# Patient Record
Sex: MALE | Race: WHITE | NOT HISPANIC OR LATINO | Employment: FULL TIME | URBAN - METROPOLITAN AREA
[De-identification: names, ages, dates, MRNs, and addresses within clinical notes are randomized per-mention and may not be internally consistent; named-entity substitution may affect disease eponyms.]

---

## 2017-06-28 ENCOUNTER — ALLSCRIPTS OFFICE VISIT (OUTPATIENT)
Dept: OTHER | Facility: OTHER | Age: 62
End: 2017-06-28

## 2017-06-28 ENCOUNTER — GENERIC CONVERSION - ENCOUNTER (OUTPATIENT)
Dept: OTHER | Facility: OTHER | Age: 62
End: 2017-06-28

## 2017-06-28 DIAGNOSIS — I10 ESSENTIAL (PRIMARY) HYPERTENSION: ICD-10-CM

## 2017-06-29 LAB
A/G RATIO (HISTORICAL): 1.7 (ref 1.2–2.2)
ALBUMIN SERPL BCP-MCNC: 4.7 G/DL (ref 3.6–4.8)
ALP SERPL-CCNC: 60 IU/L (ref 39–117)
ALT SERPL W P-5'-P-CCNC: 31 IU/L (ref 0–44)
AST SERPL W P-5'-P-CCNC: 29 IU/L (ref 0–40)
BILIRUB SERPL-MCNC: 0.4 MG/DL (ref 0–1.2)
BUN SERPL-MCNC: 10 MG/DL (ref 8–27)
BUN/CREA RATIO (HISTORICAL): 13 (ref 10–24)
CALCIUM SERPL-MCNC: 9.3 MG/DL (ref 8.6–10.2)
CHLORIDE SERPL-SCNC: 102 MMOL/L (ref 96–106)
CHOLEST SERPL-MCNC: 175 MG/DL (ref 100–199)
CHOLEST/HDLC SERPL: 4 RATIO UNITS (ref 0–5)
CO2 SERPL-SCNC: 20 MMOL/L (ref 18–29)
CREAT SERPL-MCNC: 0.76 MG/DL (ref 0.76–1.27)
DEPRECATED RDW RBC AUTO: 14.4 % (ref 12.3–15.4)
EGFR AFRICAN AMERICAN (HISTORICAL): 112 ML/MIN/1.73
EGFR-AMERICAN CALC (HISTORICAL): 97 ML/MIN/1.73
GLUCOSE SERPL-MCNC: 99 MG/DL (ref 65–99)
HCT VFR BLD AUTO: 44.7 % (ref 37.5–51)
HDLC SERPL-MCNC: 44 MG/DL
HEPATITIS C ANTIBODY (HISTORICAL): <0.1 S/CO RATIO (ref 0–0.9)
HGB BLD-MCNC: 14.5 G/DL (ref 12.6–17.7)
LDLC SERPL CALC-MCNC: 99 MG/DL (ref 0–99)
MCH RBC QN AUTO: 29.8 PG (ref 26.6–33)
MCHC RBC AUTO-ENTMCNC: 32.4 G/DL (ref 31.5–35.7)
MCV RBC AUTO: 92 FL (ref 79–97)
PLATELET # BLD AUTO: 354 X10E3/UL (ref 150–379)
POTASSIUM SERPL-SCNC: 5 MMOL/L (ref 3.5–5.2)
RBC (HISTORICAL): 4.86 X10E6/UL (ref 4.14–5.8)
SODIUM SERPL-SCNC: 142 MMOL/L (ref 134–144)
TOT. GLOBULIN, SERUM (HISTORICAL): 2.8 G/DL (ref 1.5–4.5)
TOTAL PROTEIN (HISTORICAL): 7.5 G/DL (ref 6–8.5)
TRIGL SERPL-MCNC: 160 MG/DL (ref 0–149)
TSH SERPL DL<=0.05 MIU/L-ACNC: 2.25 UIU/ML (ref 0.45–4.5)
URIC ACID (HISTORICAL): 10.9 MG/DL (ref 3.7–8.6)
VLDLC SERPL CALC-MCNC: 32 MG/DL (ref 5–40)
WBC # BLD AUTO: 6.8 X10E3/UL (ref 3.4–10.8)

## 2017-06-30 ENCOUNTER — GENERIC CONVERSION - ENCOUNTER (OUTPATIENT)
Dept: OTHER | Facility: OTHER | Age: 62
End: 2017-06-30

## 2017-06-30 LAB — PROSTATE SPECIFIC ANTIGEN (HISTORICAL): 1 NG/ML (ref 0–4)

## 2017-11-13 ENCOUNTER — ALLSCRIPTS OFFICE VISIT (OUTPATIENT)
Dept: OTHER | Facility: OTHER | Age: 62
End: 2017-11-13

## 2017-11-14 ENCOUNTER — GENERIC CONVERSION - ENCOUNTER (OUTPATIENT)
Dept: OTHER | Facility: OTHER | Age: 62
End: 2017-11-14

## 2017-11-14 LAB — URIC ACID (HISTORICAL): 4.3 MG/DL (ref 3.7–8.6)

## 2017-11-14 NOTE — PROGRESS NOTES
Assessment    1  Gout (274 9) (M10 9)   2  Benign hypertension (401 1) (I10)   3  GERD without esophagitis (530 81) (K21 9)    Plan  Benign hypertension    · Valsartan-Hydrochlorothiazide 160-25 MG Oral Tablet; TAKE 1 TABLET DAILY  GERD without esophagitis    · Omeprazole 40 MG Oral Capsule Delayed Release; TAKE 1 CAPSULE DailyBefore Meals  Gout    · Allopurinol 300 MG Oral Tablet; TAKE 2 TABLETS DAILY   · (1) URIC ACID; Status:Active; Requested for:13Nov2017;   1   Gout-uncontrolled on allopurinol, he has had 3 flares since starting it  We will recheck the uric acid today and I am increasing the allopurinol to 600 milligrams daily  2   Hypertension-uncontrolled on valsartan  I am changing the medication to valsartan with hydrochlorothiazide  This should bring the pressure is down to where they need to be but he will continue to keep track of them on an occasional basis and let me know if they are still high  3   GERD on the indomethacin-I am going to give him omeprazole to take while he is on anti-inflammatories as I think this will help him tolerate the medications better  He does not need to take it when he is not on the anti-inflammatories  I also advised that if the indomethacin is not helping the gout after another few days he can certainly call and I can prescribe prednisone  Chief Complaint  Pt here for f/u on gout and bp check  He was taking allopurinol and since has had 3 episodes of gout some worse some not too bad  He seems to remember being on it in the past and having the same response has noticed his BP has been up sometimes  He talked about starting HCTZ        History of Present Illness  HPI: Patient is here today to follow-up on his hypertension and gout  he was last here he has been on the allopurinol - uric acid was 10 9 and I started 300mg dailyhad 2 minor episodes of gout since then and most recently one more severe episode in the right foot/ankle as he has had beforewent [place] because it got really bad - he got indomethacinpretty hard on his stomachit is helping the goutthe past few years he really has avoided triggers, ken seafood/shellfishavoids anything high in purineslittle beefevery on his father side of the family and most of his siblings, nieces and nephews all have gout  far as the BP, he feels it is still a little high every now and thenis about where it is todaychest pain or shortness of breathNo headaches      Active Problems  1  Arthritis (716 90) (M19 90)   2  Benign hypertension (401 1) (I10)   3  Gout (274 9) (M10 9)   4  Hyperlipidemia (272 4) (E78 5)   5  Need for hepatitis C screening test (V73 89) (Z11 59)   6  Screening PSA (prostate specific antigen) (V76 44) (Z12 5)    Past Medical History  1  History of irregular heartbeat (V12 59) (Z86 79)   2  Personal history of kidney stones (V13 01) (U59 111)  Active Problems And Past Medical History Reviewed: The active problems and past medical history were reviewed and updated today  Family History  Mother    1  Family history of arthritis (V17 7) (Z82 61)   2  Family history of cardiac disorder (V17 49) (Z82 49)   3  Family history of hypertension (V17 49) (Z82 49)  Father    4  Family history of gout (V18 19) (Z82 69)   5  Family history of malignant neoplasm (V16 9) (Z80 9)  Sibling    10  Family history of gout (V18 19) (Z82 69)  Multiple Family Members    7  Family history of gout (V18 19) (Z82 69)  Paternal Relatives    8  Family history of gout (V18 19) (Z82 69)  Family History Reviewed: The family history was reviewed and updated today  Social History   · Currently works full-time (V49 89) (Z78 9)   · Former smoker (X12 95) (P37 750)   ·    · No illicit drug use   · Non smoker (V49 89) (Z78 9)   · Occasional alcohol use  The social history was reviewed and updated today  Surgical History    1  History of Hernia Repair  Surgical History Reviewed:    The surgical history was reviewed and updated today  Current Meds   1  Allopurinol 300 MG Oral Tablet; TAKE 1 TABLET DAILY; Therapy: [id number] to (Evaluate:46Rfs1503)  Requested for: 30Jun2017; Last Rx:30Jun2017; Status: ACTIVE - Transmit to Pharmacy - Awaiting Verification Ordered   2  Pravastatin Sodium 20 MG Oral Tablet; TAKE 1 TABLET DAILY AS DIRECTED; Therapy: 44JPG3218 to (Evaluate:56Xpb6433) Recorded   3  Valsartan 160 MG Oral Tablet; TAKE 1 TABLET DAILY; Therapy: 29PBS4985 to (Bertis Ore)  Requested for: 41PJI3591; Last Rx:85Vqa9559 Ordered    The medication list was reviewed and updated today  Allergies  1  No Known Drug Allergies  2  Shellfish    Vitals   Recorded: 97KDD2252 04:09PM   Temperature 98 4 F   Heart Rate 78   Systolic 797   Diastolic 82   Height 5 ft 6 in   Weight 183 lb    BMI Calculated 29 54   BSA Calculated 1 93   O2 Saturation 98       Physical Exam   Constitutional  General appearance: No acute distress, well appearing and well nourished  Eyes  Conjunctiva and lids: No swelling, erythema, or discharge  Ears, Nose, Mouth, and Throat  External inspection of ears and nose: Normal    Pulmonary  Respiratory effort: No increased work of breathing or signs of respiratory distress  Musculoskeletal  Gait and station: Abnormal   Gait evaluation demonstrated limping on the right  Inspection/palpation of joints, bones, and muscles: Abnormal   Appearance - right ankle swelling  Skin  Skin and subcutaneous tissue: Normal without rashes or lesions     Psychiatric  Orientation to person, place and time: Normal    Mood and affect: Normal          Signatures   Electronically signed by : MADELYN Voss ; Nov 13 2017  4:42PM EST                       (Author)

## 2017-11-16 ENCOUNTER — APPOINTMENT (OUTPATIENT)
Dept: RADIOLOGY | Facility: CLINIC | Age: 62
End: 2017-11-16
Payer: COMMERCIAL

## 2017-11-16 DIAGNOSIS — M79.671 RIGHT FOOT PAIN: ICD-10-CM

## 2017-11-16 PROCEDURE — 73630 X-RAY EXAM OF FOOT: CPT

## 2017-11-22 ENCOUNTER — GENERIC CONVERSION - ENCOUNTER (OUTPATIENT)
Dept: OTHER | Facility: OTHER | Age: 62
End: 2017-11-22

## 2018-01-09 NOTE — RESULT NOTES
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

## 2018-01-13 NOTE — RESULT NOTES
Discussion/Summary   Please let the patient know his uric acid was 4 3-I think that we should NOT increase the dose of allopurinol as we had discussed at his visit due to this  I wonder if his symptoms are due to something other than gout given the uric acid level is so low  For now I would recommend he continue to take 300 mg of allopurinol once a day and please give him the information for Dr Shonna Simpson office, podiatry, to see if maybe there is something else going on that is not gout related       Verified Results  (1) URIC ACID [id number] 12:00AM Ilsa Salts     Test Name Result Flag Reference   Uric Acid, Serum 4 3 mg/dL  3 7-8 6   Therapeutic target for gout patients: <6 0

## 2018-01-14 VITALS
WEIGHT: 183 LBS | OXYGEN SATURATION: 98 % | TEMPERATURE: 98.4 F | DIASTOLIC BLOOD PRESSURE: 82 MMHG | BODY MASS INDEX: 29.41 KG/M2 | HEART RATE: 78 BPM | SYSTOLIC BLOOD PRESSURE: 140 MMHG | HEIGHT: 66 IN

## 2018-01-14 VITALS
HEIGHT: 66 IN | DIASTOLIC BLOOD PRESSURE: 90 MMHG | BODY MASS INDEX: 29.05 KG/M2 | HEART RATE: 77 BPM | TEMPERATURE: 98.8 F | SYSTOLIC BLOOD PRESSURE: 170 MMHG | WEIGHT: 180.75 LBS

## 2018-01-15 NOTE — RESULT NOTES
Discussion/Summary   Please let the pt know that all of his labs are normal EXCEPT for the uric acid  The uric acid should be less than 8 and it is 10 9  He has known gout and we discussed this at his visit  I would strongly recommend he take a medication called allopurinol which will lower the uric acid  As we discussed at his visit a build up of uric acid will cause chronic damage to his joints so it is very important to keep it low  This may be something he needs to take for the rest of his life to prevent chronic damage to his joints  If he has further questions please let me know  If he is agreeable I can send this in for him  Thx!      Verified Results  (1) URIC ACID 28Jun2017 12:00AM Maribell Riggs     Test Name Result Flag Reference   Uric Acid, Serum 10 9 mg/dL H 3 7-8 6   Therapeutic target for gout patients: <6 0

## 2018-04-10 ENCOUNTER — OFFICE VISIT (OUTPATIENT)
Dept: FAMILY MEDICINE CLINIC | Facility: CLINIC | Age: 63
End: 2018-04-10
Payer: COMMERCIAL

## 2018-04-10 VITALS
OXYGEN SATURATION: 98 % | WEIGHT: 182.5 LBS | DIASTOLIC BLOOD PRESSURE: 78 MMHG | SYSTOLIC BLOOD PRESSURE: 142 MMHG | HEART RATE: 67 BPM | HEIGHT: 66 IN | BODY MASS INDEX: 29.33 KG/M2 | TEMPERATURE: 98.7 F

## 2018-04-10 DIAGNOSIS — K40.90 RIGHT GROIN HERNIA: Primary | ICD-10-CM

## 2018-04-10 PROBLEM — M10.9 GOUT: Status: ACTIVE | Noted: 2017-06-28

## 2018-04-10 PROBLEM — I10 BENIGN HYPERTENSION: Status: ACTIVE | Noted: 2017-06-28

## 2018-04-10 PROBLEM — M19.90 ARTHRITIS: Status: ACTIVE | Noted: 2017-06-28

## 2018-04-10 PROBLEM — K21.9 GERD WITHOUT ESOPHAGITIS: Status: ACTIVE | Noted: 2017-11-13

## 2018-04-10 PROBLEM — E78.5 HYPERLIPIDEMIA: Status: ACTIVE | Noted: 2017-06-28

## 2018-04-10 PROCEDURE — 99213 OFFICE O/P EST LOW 20 MIN: CPT | Performed by: FAMILY MEDICINE

## 2018-04-10 RX ORDER — ALLOPURINOL 300 MG/1
2 TABLET ORAL DAILY
COMMUNITY
Start: 2017-06-30 | End: 2018-12-19 | Stop reason: SDUPTHER

## 2018-04-10 RX ORDER — PRAVASTATIN SODIUM 20 MG
1 TABLET ORAL DAILY
COMMUNITY
Start: 2017-06-28 | End: 2019-05-06 | Stop reason: ALTCHOICE

## 2018-04-10 RX ORDER — OMEPRAZOLE 40 MG/1
CAPSULE, DELAYED RELEASE ORAL DAILY
COMMUNITY
Start: 2017-11-13 | End: 2019-05-06 | Stop reason: ALTCHOICE

## 2018-04-10 RX ORDER — VALSARTAN 160 MG/1
1 TABLET ORAL DAILY
COMMUNITY
Start: 2017-06-28 | End: 2018-08-16

## 2018-04-10 RX ORDER — VALSARTAN AND HYDROCHLOROTHIAZIDE 160; 25 MG/1; MG/1
1 TABLET ORAL DAILY
COMMUNITY
Start: 2017-11-13 | End: 2018-08-16

## 2018-04-10 NOTE — PROGRESS NOTES
Assessment/Plan:      Diagnoses and all orders for this visit:    Right groin hernia  -     Ambulatory referral to General Surgery; Future    I am going to refer the patient to General surgery for further evaluation  Given the fact that this is in the same spot as his old hernia he probably does have a mild hernia there that ice simply cannot feel  Subjective:     Patient ID: Tana Munoz is a 57 y o  male  The pt is here with pain in the right side of the groin for about 3 months   It is in the same exact area where he had a hernia repair when he was 1 y/o  He does notice it with coughing  Notices when twisting his body as opposed to lifting  Does not bother him if he is sitting still  Does not wake him from sleep  It is relatively mild right now  He does not want to get any worse which is why he decided to come in        Review of Systems      The following portions of the patient's history were reviewed and updated as appropriate: allergies, current medications, past family history, past medical history, past social history, past surgical history and problem list     Objective:  Vitals:    04/10/18 0851   BP: 142/78   Pulse: 67   Temp: 98 7 °F (37 1 °C)   SpO2: 98%   Weight: 82 8 kg (182 lb 8 oz)   Height: 5' 6" (1 676 m)      Physical Exam   Constitutional: He appears well-developed and well-nourished  Abdominal: Soft  There is no tenderness  Mild ttp in the right groin over the old hernia scar, no overt hernia appreciated   Skin: Skin is warm and dry

## 2018-04-19 ENCOUNTER — OFFICE VISIT (OUTPATIENT)
Dept: SURGERY | Facility: HOSPITAL | Age: 63
End: 2018-04-19
Attending: FAMILY MEDICINE
Payer: COMMERCIAL

## 2018-04-19 VITALS
RESPIRATION RATE: 16 BRPM | DIASTOLIC BLOOD PRESSURE: 74 MMHG | HEIGHT: 66 IN | WEIGHT: 182.8 LBS | TEMPERATURE: 99.1 F | BODY MASS INDEX: 29.38 KG/M2 | HEART RATE: 76 BPM | SYSTOLIC BLOOD PRESSURE: 130 MMHG

## 2018-04-19 DIAGNOSIS — K40.91 RECURRENT RIGHT INGUINAL HERNIA: ICD-10-CM

## 2018-04-19 DIAGNOSIS — K40.90 LEFT INGUINAL HERNIA: ICD-10-CM

## 2018-04-19 DIAGNOSIS — D18.01 HEMANGIOMA OF SKIN: ICD-10-CM

## 2018-04-19 DIAGNOSIS — K42.9 UMBILICAL HERNIA WITHOUT OBSTRUCTION OR GANGRENE: ICD-10-CM

## 2018-04-19 DIAGNOSIS — Z12.11 ENCOUNTER FOR SCREENING COLONOSCOPY: ICD-10-CM

## 2018-04-19 DIAGNOSIS — D22.9 MULTIPLE PIGMENTED NEVI: ICD-10-CM

## 2018-04-19 DIAGNOSIS — K40.90 RIGHT GROIN HERNIA: ICD-10-CM

## 2018-04-19 DIAGNOSIS — Z12.11 SPECIAL SCREENING FOR MALIGNANT NEOPLASMS, COLON: Primary | ICD-10-CM

## 2018-04-19 DIAGNOSIS — L91.8 SKIN TAGS, MULTIPLE ACQUIRED: ICD-10-CM

## 2018-04-19 PROBLEM — K40.20 NON-RECURRENT BILATERAL INGUINAL HERNIA WITHOUT OBSTRUCTION OR GANGRENE: Status: ACTIVE | Noted: 2018-04-19

## 2018-04-19 PROCEDURE — 99204 OFFICE O/P NEW MOD 45 MIN: CPT | Performed by: SURGERY

## 2018-04-19 RX ORDER — SODIUM CHLORIDE 9 MG/ML
75 INJECTION, SOLUTION INTRAVENOUS CONTINUOUS
Status: CANCELLED | OUTPATIENT
Start: 2018-05-08

## 2018-04-19 RX ORDER — SODIUM CHLORIDE 9 MG/ML
125 INJECTION, SOLUTION INTRAVENOUS CONTINUOUS
Status: CANCELLED | OUTPATIENT
Start: 2018-05-08

## 2018-04-19 NOTE — PROGRESS NOTES
Assessment/Plan: Kaushik Briseno is a 57year old male whp presents today, per referral by Dr Azeem Rivera, for consultation regarding a right inguinal hernia  Physical exam revealed recurrent right inguinal hernia, reducible left inguinal hernia, and umbilical hernia  Discussed risks, benefits, and alternatives to laparoscopic bilateral inguinal hernia repair and umbilical hernia repair  Explained the surgery, as well as pre- and post-operative protocol and restrictions  No heavy lifting greater than 15 pounds and no strenuous activity for the first two weeks  No heavy lifting greater than 25 pounds for weeks 3-4, light cardiovascular activity permissible  He will schedule surgery for his earliest convenience  He knows to call if any questions or concerns arise  Colonoscopy- Encouraged him to have a screening colonoscopy  Discussed risks and benefits  He will schedule for the same week as surgery  Skin- Encouraged him to have his multiple pigmented nevi and skin tags of neck and multiple pigmented nevi and scattered hemangiomas of back monitored by PCP or dermatologist     No problem-specific Assessment & Plan notes found for this encounter  Diagnoses and all orders for this visit:    Right groin hernia  -     Ambulatory referral to General Surgery          Subjective:      Patient ID: Kaushik Briseno is a 57 y o  male  Kaushik Briseno is a 57year old male whp presents today, per referral by Dr Azeem Rivera, for consultation regarding a right inguinal hernia  This would be a recurrent right inguinal hernia as he had a right inguinal hernia open repair when he was a child  He has had pain for 4 months    Colonoscopy- He has never had a colonoscopy            The following portions of the patient's history were reviewed and updated as appropriate: allergies, current medications, past family history, past medical history, past social history, past surgical history and problem list     Review of Systems Constitutional: Negative  HENT: Negative  Eyes: Negative  Respiratory: Negative  Cardiovascular: Negative  Gastrointestinal: Positive for abdominal pain  Endocrine: Negative  Genitourinary: Negative  Musculoskeletal: Negative  Skin: Negative  Allergic/Immunologic: Negative  Neurological: Negative  Hematological: Negative  Psychiatric/Behavioral: Negative  All other systems reviewed and are negative  Objective:      /74 (BP Location: Left arm, Patient Position: Sitting, Cuff Size: Standard)   Pulse 76   Temp 99 1 °F (37 3 °C) (Tympanic)   Resp 16   Ht 5' 6" (1 676 m)   Wt 82 9 kg (182 lb 12 8 oz)   BMI 29 50 kg/m²          Physical Exam   Constitutional: He is oriented to person, place, and time  He appears well-developed and well-nourished  No distress  HENT:   Head: Normocephalic and atraumatic  Right Ear: External ear normal    Left Ear: External ear normal    Nose: Nose normal    Mouth/Throat: Oropharynx is clear and moist  No oropharyngeal exudate  Eyes: Conjunctivae and EOM are normal  Right eye exhibits no discharge  Left eye exhibits no discharge  No scleral icterus  Neck: Normal range of motion  Neck supple  No JVD present  No tracheal deviation present  No thyromegaly present  Cardiovascular: Normal rate, regular rhythm, normal heart sounds and intact distal pulses  Exam reveals no gallop and no friction rub  No murmur heard  Pulmonary/Chest: Effort normal and breath sounds normal  No stridor  No respiratory distress  He has no wheezes  He has no rales  He exhibits no tenderness  Abdominal: Soft  Bowel sounds are normal  He exhibits no distension and no mass  There is no tenderness  There is no rebound and no guarding  Recurrent right inguinal hernia  Reducible left inguinal hernia  Umbilical hernia  Musculoskeletal: Normal range of motion  He exhibits no edema, tenderness or deformity     Lymphadenopathy:     He has no cervical adenopathy  Neurological: He is alert and oriented to person, place, and time  No cranial nerve deficit  Coordination normal    Skin: Skin is dry  No rash noted  He is not diaphoretic  No erythema  No pallor  Multiple pigmented nevi and skin tags of neck  Multiple pigmented nevi and scattered hemangiomas of back  Psychiatric: He has a normal mood and affect  His behavior is normal  Thought content normal    Nursing note and vitals reviewed  By signing my name below, Mahesh Hooks, attjaun that this documentation has been prepared under the direction and in the presence of Dr Carmen Garay MD  Electronically signed: Blane Sawyer, Medical Scribe  4/19/18  Alem Mckeon, personally performed the services described in this documentation  All medical record entries made by the scribe were at my direction and in my presence  I have reviewed the chart and agree that the record reflects my personal performance and is accurate and complete  Dr Carmen Garay MD  4/19/18

## 2018-04-19 NOTE — LETTER
April 20, 2018     Hilda Pollock MD    [address] [facility] [id number]    Patient: Mckay Gardner   YOB: 1955   Date of Visit: 4/19/2018       Dear Dr Lucetta Prader:    Thank you for referring Mckay Gardner to me for evaluation  Below are my notes for this consultation  If you have questions, please do not hesitate to call me  I look forward to following your patient along with you           Sincerely,        Lisa Dixon MD        CC: No Recipients

## 2018-04-20 RX ORDER — SODIUM CHLORIDE, SODIUM LACTATE, POTASSIUM CHLORIDE, CALCIUM CHLORIDE 600; 310; 30; 20 MG/100ML; MG/100ML; MG/100ML; MG/100ML
125 INJECTION, SOLUTION INTRAVENOUS CONTINUOUS
Status: CANCELLED | OUTPATIENT
Start: 2018-05-09

## 2018-08-16 ENCOUNTER — TELEPHONE (OUTPATIENT)
Dept: FAMILY MEDICINE CLINIC | Facility: CLINIC | Age: 63
End: 2018-08-16

## 2018-08-16 DIAGNOSIS — I10 BENIGN HYPERTENSION: Primary | ICD-10-CM

## 2018-08-16 RX ORDER — LOSARTAN POTASSIUM AND HYDROCHLOROTHIAZIDE 25; 100 MG/1; MG/1
1 TABLET ORAL DAILY
Qty: 90 TABLET | Refills: 3 | Status: SHIPPED | OUTPATIENT
Start: 2018-08-16 | End: 2018-12-18 | Stop reason: SDUPTHER

## 2018-08-16 NOTE — TELEPHONE ENCOUNTER
JACKIE FROM [facility] PHONED REGARDING RX REFILL FOR VALSARTAN HCTZ 160/25  ALTERNATIVE  PLEASE CALL HER BACK WITH NEW MEDICATION INFORMATION

## 2018-12-18 DIAGNOSIS — Z12.5 SCREENING PSA (PROSTATE SPECIFIC ANTIGEN): ICD-10-CM

## 2018-12-18 DIAGNOSIS — E78.2 MIXED HYPERLIPIDEMIA: ICD-10-CM

## 2018-12-18 DIAGNOSIS — I10 BENIGN HYPERTENSION: ICD-10-CM

## 2018-12-18 DIAGNOSIS — M10.9 GOUT, UNSPECIFIED CAUSE, UNSPECIFIED CHRONICITY, UNSPECIFIED SITE: Primary | ICD-10-CM

## 2018-12-18 NOTE — TELEPHONE ENCOUNTER
Patient states the allopurinol is QD, not BID  Can we send it that way? Please send to [facility] in [place]

## 2018-12-19 PROBLEM — K40.20 NON-RECURRENT BILATERAL INGUINAL HERNIA WITHOUT OBSTRUCTION OR GANGRENE: Status: RESOLVED | Noted: 2018-04-19 | Resolved: 2018-12-19

## 2018-12-19 PROBLEM — K42.9 UMBILICAL HERNIA WITHOUT OBSTRUCTION OR GANGRENE: Status: RESOLVED | Noted: 2018-04-19 | Resolved: 2018-12-19

## 2018-12-19 PROBLEM — Z12.11 SPECIAL SCREENING FOR MALIGNANT NEOPLASMS, COLON: Status: RESOLVED | Noted: 2018-04-19 | Resolved: 2018-12-19

## 2018-12-19 RX ORDER — LOSARTAN POTASSIUM AND HYDROCHLOROTHIAZIDE 25; 100 MG/1; MG/1
1 TABLET ORAL DAILY
Qty: 90 TABLET | Refills: 0 | Status: SHIPPED | OUTPATIENT
Start: 2018-12-19 | End: 2019-05-31 | Stop reason: ALTCHOICE

## 2018-12-19 RX ORDER — ALLOPURINOL 300 MG/1
300 TABLET ORAL DAILY
Qty: 90 TABLET | Refills: 3 | Status: SHIPPED | OUTPATIENT
Start: 2018-12-19 | End: 2020-01-06 | Stop reason: SDUPTHER

## 2018-12-19 NOTE — TELEPHONE ENCOUNTER
I sent the BP med and allopurinol (changed)  He is due for labs and a physical   He's 63 now, not sure if he has medicare or not - if so he needs a welcome to medicare  I ordered the labs to [facility] - we can change that if needed

## 2019-04-19 ENCOUNTER — TELEPHONE (OUTPATIENT)
Dept: FAMILY MEDICINE CLINIC | Facility: CLINIC | Age: 64
End: 2019-04-19

## 2019-05-06 ENCOUNTER — OFFICE VISIT (OUTPATIENT)
Dept: FAMILY MEDICINE CLINIC | Facility: CLINIC | Age: 64
End: 2019-05-06
Payer: COMMERCIAL

## 2019-05-06 VITALS
HEIGHT: 65 IN | HEART RATE: 60 BPM | BODY MASS INDEX: 29.59 KG/M2 | SYSTOLIC BLOOD PRESSURE: 130 MMHG | RESPIRATION RATE: 16 BRPM | WEIGHT: 177.6 LBS | DIASTOLIC BLOOD PRESSURE: 70 MMHG

## 2019-05-06 DIAGNOSIS — I10 BENIGN HYPERTENSION: ICD-10-CM

## 2019-05-06 DIAGNOSIS — Z23 NEED FOR SHINGLES VACCINE: ICD-10-CM

## 2019-05-06 DIAGNOSIS — R00.2 PALPITATIONS: ICD-10-CM

## 2019-05-06 DIAGNOSIS — R42 VERTIGO: ICD-10-CM

## 2019-05-06 DIAGNOSIS — E78.5 HYPERLIPIDEMIA, UNSPECIFIED HYPERLIPIDEMIA TYPE: Primary | ICD-10-CM

## 2019-05-06 DIAGNOSIS — Z12.5 SCREENING PSA (PROSTATE SPECIFIC ANTIGEN): ICD-10-CM

## 2019-05-06 PROCEDURE — 90471 IMMUNIZATION ADMIN: CPT

## 2019-05-06 PROCEDURE — 90750 HZV VACC RECOMBINANT IM: CPT

## 2019-05-06 PROCEDURE — 99214 OFFICE O/P EST MOD 30 MIN: CPT | Performed by: NURSE PRACTITIONER

## 2019-05-06 PROCEDURE — 1101F PT FALLS ASSESS-DOCD LE1/YR: CPT | Performed by: NURSE PRACTITIONER

## 2019-05-06 RX ORDER — VALSARTAN AND HYDROCHLOROTHIAZIDE 80; 12.5 MG/1; MG/1
1 TABLET, FILM COATED ORAL DAILY
Qty: 90 TABLET | Refills: 1 | Status: SHIPPED | OUTPATIENT
Start: 2019-05-06 | End: 2019-11-04 | Stop reason: SDUPTHER

## 2019-05-15 ENCOUNTER — HOSPITAL ENCOUNTER (OUTPATIENT)
Dept: NON INVASIVE DIAGNOSTICS | Facility: CLINIC | Age: 64
Discharge: HOME | End: 2019-05-15
Payer: COMMERCIAL

## 2019-05-15 DIAGNOSIS — R42 VERTIGO: ICD-10-CM

## 2019-05-15 DIAGNOSIS — R00.2 PALPITATIONS: ICD-10-CM

## 2019-05-15 PROCEDURE — 93880 EXTRACRANIAL BILAT STUDY: CPT

## 2019-05-15 PROCEDURE — 93225 XTRNL ECG REC<48 HRS REC: CPT

## 2019-05-15 PROCEDURE — 93226 XTRNL ECG REC<48 HR SCAN A/R: CPT

## 2019-05-15 PROCEDURE — 93880 EXTRACRANIAL BILAT STUDY: CPT | Performed by: INTERNAL MEDICINE

## 2019-05-23 ENCOUNTER — APPOINTMENT (OUTPATIENT)
Dept: LAB | Facility: CLINIC | Age: 64
End: 2019-05-23
Payer: COMMERCIAL

## 2019-05-23 DIAGNOSIS — Z12.5 SCREENING PSA (PROSTATE SPECIFIC ANTIGEN): ICD-10-CM

## 2019-05-23 DIAGNOSIS — I10 BENIGN HYPERTENSION: ICD-10-CM

## 2019-05-23 DIAGNOSIS — E78.5 HYPERLIPIDEMIA, UNSPECIFIED HYPERLIPIDEMIA TYPE: ICD-10-CM

## 2019-05-23 LAB
ALBUMIN SERPL BCP-MCNC: 3.9 G/DL (ref 3.5–5)
ALP SERPL-CCNC: 55 U/L (ref 46–116)
ALT SERPL W P-5'-P-CCNC: 27 U/L (ref 12–78)
ANION GAP SERPL CALCULATED.3IONS-SCNC: 5 MMOL/L (ref 4–13)
AST SERPL W P-5'-P-CCNC: 22 U/L (ref 5–45)
BASOPHILS # BLD AUTO: 0.04 THOUSANDS/ΜL (ref 0–0.1)
BASOPHILS NFR BLD AUTO: 1 % (ref 0–1)
BILIRUB SERPL-MCNC: 0.75 MG/DL (ref 0.2–1)
BUN SERPL-MCNC: 16 MG/DL (ref 5–25)
CALCIUM SERPL-MCNC: 8.6 MG/DL (ref 8.3–10.1)
CHLORIDE SERPL-SCNC: 106 MMOL/L (ref 100–108)
CHOLEST SERPL-MCNC: 167 MG/DL (ref 50–200)
CO2 SERPL-SCNC: 27 MMOL/L (ref 21–32)
CREAT SERPL-MCNC: 0.84 MG/DL (ref 0.6–1.3)
EOSINOPHIL # BLD AUTO: 0.05 THOUSAND/ΜL (ref 0–0.61)
EOSINOPHIL NFR BLD AUTO: 1 % (ref 0–6)
ERYTHROCYTE [DISTWIDTH] IN BLOOD BY AUTOMATED COUNT: 14.8 % (ref 11.6–15.1)
GFR SERPL CREATININE-BSD FRML MDRD: 92 ML/MIN/1.73SQ M
GLUCOSE SERPL-MCNC: 99 MG/DL (ref 65–140)
HCT VFR BLD AUTO: 44 % (ref 36.5–49.3)
HDLC SERPL-MCNC: 36 MG/DL (ref 40–60)
HGB BLD-MCNC: 14.6 G/DL (ref 12–17)
IMM GRANULOCYTES # BLD AUTO: 0.01 THOUSAND/UL (ref 0–0.2)
IMM GRANULOCYTES NFR BLD AUTO: 0 % (ref 0–2)
LDLC SERPL CALC-MCNC: 90 MG/DL (ref 0–100)
LYMPHOCYTES # BLD AUTO: 2 THOUSANDS/ΜL (ref 0.6–4.47)
LYMPHOCYTES NFR BLD AUTO: 33 % (ref 14–44)
MCH RBC QN AUTO: 31.5 PG (ref 26.8–34.3)
MCHC RBC AUTO-ENTMCNC: 33.2 G/DL (ref 31.4–37.4)
MCV RBC AUTO: 95 FL (ref 82–98)
MONOCYTES # BLD AUTO: 0.58 THOUSAND/ΜL (ref 0.17–1.22)
MONOCYTES NFR BLD AUTO: 10 % (ref 4–12)
NEUTROPHILS # BLD AUTO: 3.37 THOUSANDS/ΜL (ref 1.85–7.62)
NEUTS SEG NFR BLD AUTO: 55 % (ref 43–75)
NONHDLC SERPL-MCNC: 131 MG/DL
NRBC BLD AUTO-RTO: 0 /100 WBCS
PLATELET # BLD AUTO: 343 THOUSANDS/UL (ref 149–390)
PMV BLD AUTO: 10.8 FL (ref 8.9–12.7)
POTASSIUM SERPL-SCNC: 3.6 MMOL/L (ref 3.5–5.3)
PROT SERPL-MCNC: 7.3 G/DL (ref 6.4–8.2)
RBC # BLD AUTO: 4.64 MILLION/UL (ref 3.88–5.62)
SODIUM SERPL-SCNC: 138 MMOL/L (ref 136–145)
TRIGL SERPL-MCNC: 204 MG/DL
TSH SERPL DL<=0.05 MIU/L-ACNC: 3.16 UIU/ML (ref 0.36–3.74)
WBC # BLD AUTO: 6.05 THOUSAND/UL (ref 4.31–10.16)

## 2019-05-23 PROCEDURE — 80053 COMPREHEN METABOLIC PANEL: CPT

## 2019-05-23 PROCEDURE — 85025 COMPLETE CBC W/AUTO DIFF WBC: CPT

## 2019-05-23 PROCEDURE — 36415 COLL VENOUS BLD VENIPUNCTURE: CPT

## 2019-05-23 PROCEDURE — 84153 ASSAY OF PSA TOTAL: CPT

## 2019-05-23 PROCEDURE — 84154 ASSAY OF PSA FREE: CPT

## 2019-05-23 PROCEDURE — 84443 ASSAY THYROID STIM HORMONE: CPT

## 2019-05-23 PROCEDURE — 80061 LIPID PANEL: CPT

## 2019-05-24 ENCOUNTER — TELEPHONE (OUTPATIENT)
Dept: FAMILY MEDICINE CLINIC | Facility: CLINIC | Age: 64
End: 2019-05-24

## 2019-05-24 ENCOUNTER — TELEPHONE (OUTPATIENT)
Dept: CARDIOLOGY CLINIC | Facility: CLINIC | Age: 64
End: 2019-05-24

## 2019-05-24 DIAGNOSIS — I49.3 FREQUENT PVCS: Primary | ICD-10-CM

## 2019-05-24 LAB
PSA FREE MFR SERPL: 34 %
PSA FREE SERPL-MCNC: 0.34 NG/ML
PSA SERPL-MCNC: 1 NG/ML (ref 0–4)

## 2019-05-24 PROCEDURE — 93227 XTRNL ECG REC<48 HR R&I: CPT | Performed by: INTERNAL MEDICINE

## 2019-05-24 RX ORDER — METOPROLOL SUCCINATE 50 MG/1
25 TABLET, EXTENDED RELEASE ORAL DAILY
Qty: 30 TABLET | Refills: 11 | Status: SHIPPED | OUTPATIENT
Start: 2019-05-24 | End: 2019-05-31 | Stop reason: SDUPTHER

## 2019-05-29 ENCOUNTER — HOSPITAL ENCOUNTER (OUTPATIENT)
Dept: NON INVASIVE DIAGNOSTICS | Facility: CLINIC | Age: 64
Discharge: HOME | End: 2019-05-29
Payer: COMMERCIAL

## 2019-05-29 DIAGNOSIS — R00.2 PALPITATIONS: ICD-10-CM

## 2019-05-29 DIAGNOSIS — R42 VERTIGO: ICD-10-CM

## 2019-05-29 PROCEDURE — 93306 TTE W/DOPPLER COMPLETE: CPT

## 2019-05-29 PROCEDURE — 93306 TTE W/DOPPLER COMPLETE: CPT | Performed by: INTERNAL MEDICINE

## 2019-05-31 ENCOUNTER — OFFICE VISIT (OUTPATIENT)
Dept: CARDIOLOGY CLINIC | Facility: CLINIC | Age: 64
End: 2019-05-31
Payer: COMMERCIAL

## 2019-05-31 VITALS
SYSTOLIC BLOOD PRESSURE: 154 MMHG | BODY MASS INDEX: 30.73 KG/M2 | HEIGHT: 64 IN | DIASTOLIC BLOOD PRESSURE: 80 MMHG | WEIGHT: 180 LBS | HEART RATE: 64 BPM

## 2019-05-31 DIAGNOSIS — R00.2 PALPITATIONS: Primary | ICD-10-CM

## 2019-05-31 DIAGNOSIS — I49.3 FREQUENT PVCS: ICD-10-CM

## 2019-05-31 DIAGNOSIS — I42.8 OTHER CARDIOMYOPATHY (HCC): ICD-10-CM

## 2019-05-31 PROCEDURE — 93000 ELECTROCARDIOGRAM COMPLETE: CPT | Performed by: INTERNAL MEDICINE

## 2019-05-31 PROCEDURE — 99244 OFF/OP CNSLTJ NEW/EST MOD 40: CPT | Performed by: INTERNAL MEDICINE

## 2019-05-31 RX ORDER — METOPROLOL SUCCINATE 50 MG/1
50 TABLET, EXTENDED RELEASE ORAL DAILY
Qty: 90 TABLET | Refills: 3 | Status: SHIPPED | OUTPATIENT
Start: 2019-05-31 | End: 2020-06-08

## 2019-06-12 ENCOUNTER — HOSPITAL ENCOUNTER (OUTPATIENT)
Dept: CT IMAGING | Facility: HOSPITAL | Age: 64
Discharge: HOME | End: 2019-06-12
Attending: INTERNAL MEDICINE
Payer: COMMERCIAL

## 2019-06-12 VITALS
OXYGEN SATURATION: 98 % | RESPIRATION RATE: 20 BRPM | HEART RATE: 63 BPM | DIASTOLIC BLOOD PRESSURE: 74 MMHG | SYSTOLIC BLOOD PRESSURE: 122 MMHG

## 2019-06-12 DIAGNOSIS — I42.8 OTHER CARDIOMYOPATHY (HCC): ICD-10-CM

## 2019-06-12 PROCEDURE — 75574 CT ANGIO HRT W/3D IMAGE: CPT

## 2019-06-12 RX ORDER — NITROGLYCERIN 0.4 MG/1
0.4 TABLET SUBLINGUAL ONCE
Status: COMPLETED | OUTPATIENT
Start: 2019-06-12 | End: 2019-06-12

## 2019-06-12 RX ADMIN — IODIXANOL 100 ML: 320 INJECTION, SOLUTION INTRAVASCULAR at 10:03

## 2019-06-12 RX ADMIN — NITROGLYCERIN 0.4 MG: 0.4 TABLET SUBLINGUAL at 09:54

## 2019-06-13 ENCOUNTER — OFFICE VISIT (OUTPATIENT)
Dept: FAMILY MEDICINE CLINIC | Facility: CLINIC | Age: 64
End: 2019-06-13
Payer: COMMERCIAL

## 2019-06-13 VITALS
DIASTOLIC BLOOD PRESSURE: 80 MMHG | WEIGHT: 179 LBS | RESPIRATION RATE: 12 BRPM | HEART RATE: 64 BPM | SYSTOLIC BLOOD PRESSURE: 122 MMHG | HEIGHT: 65 IN | BODY MASS INDEX: 29.82 KG/M2

## 2019-06-13 DIAGNOSIS — M10.9 GOUT, UNSPECIFIED CAUSE, UNSPECIFIED CHRONICITY, UNSPECIFIED SITE: ICD-10-CM

## 2019-06-13 DIAGNOSIS — I10 BENIGN HYPERTENSION: ICD-10-CM

## 2019-06-13 DIAGNOSIS — I42.5 OTHER RESTRICTIVE CARDIOMYOPATHY (HCC): ICD-10-CM

## 2019-06-13 DIAGNOSIS — E78.2 MIXED HYPERLIPIDEMIA: ICD-10-CM

## 2019-06-13 DIAGNOSIS — Z12.11 SCREENING FOR MALIGNANT NEOPLASM OF COLON: Primary | ICD-10-CM

## 2019-06-13 DIAGNOSIS — K21.9 GERD WITHOUT ESOPHAGITIS: ICD-10-CM

## 2019-06-13 PROCEDURE — 99214 OFFICE O/P EST MOD 30 MIN: CPT | Performed by: NURSE PRACTITIONER

## 2019-06-14 DIAGNOSIS — J98.59 MEDIASTINAL MASS: Primary | ICD-10-CM

## 2019-06-17 ENCOUNTER — TELEPHONE (OUTPATIENT)
Dept: SLEEP CENTER | Facility: CLINIC | Age: 64
End: 2019-06-17

## 2019-06-20 ENCOUNTER — TELEPHONE (OUTPATIENT)
Dept: CARDIOLOGY CLINIC | Facility: CLINIC | Age: 64
End: 2019-06-20

## 2019-06-20 DIAGNOSIS — D49.89 ANTERIOR MEDIASTINAL TUMOR: Primary | ICD-10-CM

## 2019-07-01 ENCOUNTER — HOSPITAL ENCOUNTER (OUTPATIENT)
Dept: RADIOLOGY | Facility: HOSPITAL | Age: 64
Discharge: HOME | End: 2019-07-01
Attending: INTERNAL MEDICINE
Payer: COMMERCIAL

## 2019-07-01 DIAGNOSIS — I42.8 OTHER CARDIOMYOPATHY (HCC): ICD-10-CM

## 2019-07-01 PROCEDURE — 75561 CARDIAC MRI FOR MORPH W/DYE: CPT

## 2019-07-01 PROCEDURE — A9585 GADOBUTROL INJECTION: HCPCS | Performed by: INTERNAL MEDICINE

## 2019-07-01 RX ADMIN — GADOBUTROL 16 ML: 604.72 INJECTION INTRAVENOUS at 17:20

## 2019-07-03 ENCOUNTER — OFFICE VISIT (OUTPATIENT)
Dept: CARDIAC SURGERY | Facility: CLINIC | Age: 64
End: 2019-07-03
Payer: COMMERCIAL

## 2019-07-03 VITALS
BODY MASS INDEX: 29.82 KG/M2 | SYSTOLIC BLOOD PRESSURE: 149 MMHG | DIASTOLIC BLOOD PRESSURE: 70 MMHG | HEIGHT: 65 IN | HEART RATE: 56 BPM | WEIGHT: 179 LBS | TEMPERATURE: 98.5 F

## 2019-07-03 DIAGNOSIS — J98.59 MEDIASTINAL MASS: Primary | ICD-10-CM

## 2019-07-03 DIAGNOSIS — D49.89 ANTERIOR MEDIASTINAL TUMOR: ICD-10-CM

## 2019-07-03 PROCEDURE — 99245 OFF/OP CONSLTJ NEW/EST HI 55: CPT | Performed by: THORACIC SURGERY (CARDIOTHORACIC VASCULAR SURGERY)

## 2019-07-03 NOTE — PROGRESS NOTES
Thoracic Consult  Assessment/Plan:    Mediastinal mass  We had a discussion with Mr Juli Ni after reviewing his imaging  He has a small anterior mediastinal mass with an undefined etiology  It could ectopic thyroid, lymphoma, or most likely a thymoma  However, secondary to his cardiac issues and insignificant size of this mass, Dr Maria G Pickering is suggesting continued follow up  He will return in 3 months with a new CT scan of the chest with contrast  If this shows growth, we will discus robotic resection at that time  The patient is in agreement with the plan  Diagnoses and all orders for this visit:    Mediastinal mass    Anterior mediastinal tumor  -     Ambulatory referral to Thoracic Surgery  -     CT chest w contrast; Future  -     BUN; Future  -     Creatinine, serum; Future          Thoracic History   Diagnosis: Mediastinal mass    Procedures/Surgeries:    Pathology:    Adjuvant Therapy:        Patient ID: Gina Urrutia is a 70 y o  male  Mr Juli Ni is a 69 yo gentleman with a history of HTN and newly diagnosed cardiomyopathy who was referred by Dr Christel Dutton for an incidental finding of a mediastinal mass  During a work up for palpitations, he underwent a CTA on 6/12/19  This revealed an anterior mediastinal mass measuring 1 4 x 1 0 cm, without any hilar or mediastinal adenopathy  On discussion, he had two episodes of palpitations before deciding to undergo an evaluation by his PCP  His echo from 5/29/19 revealed an EF of 35-40%  He has not had any further episodes since then  He denies fever, chills, weight loss, chest pain, or shortness of breath       HPI        Past Medical History:   Diagnosis Date    Cardiomyopathy ([address] )     Inguinal hernia, right     Irregular heartbeat     Kidney stones     Mediastinal mass     Anterior     Palpitations     Vertigo       Past Surgical History:   Procedure Laterality Date    HERNIA REPAIR        Family History   Problem Relation Age of Onset    Arthritis Mother     Hypertension Mother     Heart disease Mother         CHF   Vanesa Roles Gout Father     Cancer Father     Gout Family     Hypertension Brother     Gout Brother     Hypertension Brother     Gout Brother     Hypertension Brother     Hypertension Brother     Scoliosis Brother     Substance Abuse Neg Hx     Alcohol abuse Neg Hx     Mental illness Neg Hx       Social History     Socioeconomic History    Marital status: /Civil Union     Spouse name: Not on file    Number of children: Not on file    Years of education: Not on file    Highest education level: Not on file   Occupational History    Occupation: fulltime   Social Needs    Financial resource strain: Not on file    Food insecurity:     Worry: Not on file     Inability: Not on file   [employer] needs:     Medical: Not on file     Non-medical: Not on file   Tobacco Use    Smoking status: Never Smoker    Smokeless tobacco: Never Used    Tobacco comment: Former   Substance and Sexual Activity    Alcohol use: Yes     Frequency: 2-3 times a week     Drinks per session: 1 or 2     Binge frequency: Never    Drug use: No    Sexual activity: Not on file   Lifestyle    Physical activity:     Days per week: Not on file     Minutes per session: Not on file    Stress: Not on file   Relationships    Social connections:     Talks on phone: Not on file     Gets together: Not on file     Attends Gnosticist service: Not on file     Active member of club or organization: Not on file     Attends meetings of clubs or organizations: Not on file     Relationship status: Not on file    Intimate partner violence:     Fear of current or ex partner: Not on file     Emotionally abused: Not on file     Physically abused: Not on file     Forced sexual activity: Not on file   Other Topics Concern    Not on file   Social History Narrative    Not on file      Review of Systems   Constitutional: Negative for chills, fever and unexpected weight change  HENT: Negative for trouble swallowing and voice change  Respiratory: Negative for cough and shortness of breath  Cardiovascular: Negative for chest pain, palpitations and leg swelling  No recent palpitations    Gastrointestinal: Negative for nausea and vomiting  Musculoskeletal: Positive for back pain  Skin: Negative for pallor  Neurological: Negative for syncope and headaches  Psychiatric/Behavioral: Negative for agitation and behavioral problems  All other systems reviewed and are negative  Objective:   Physical Exam   Constitutional: He is oriented to person, place, and time  He appears well-developed  HENT:   Head: Normocephalic and atraumatic  Eyes: Pupils are equal, round, and reactive to light  No scleral icterus    + glasses   Neck: Normal range of motion  Neck supple  Cardiovascular: Normal rate and regular rhythm  Pulmonary/Chest: Effort normal and breath sounds normal  No respiratory distress  Musculoskeletal: Normal range of motion  Neurological: He is alert and oriented to person, place, and time  No cranial nerve deficit  Skin: Skin is warm and dry  He is not diaphoretic  Psychiatric: He has a normal mood and affect  His behavior is normal    Vitals reviewed      /70 (BP Location: Left arm, Patient Position: Sitting, Cuff Size: Adult)   Pulse 56   Temp 98 5 °F (36 9 °C)   Ht 5' 4 5" (1 638 m)   Wt 81 2 kg (179 lb)   BMI 30 25 kg/m²

## 2019-07-03 NOTE — ASSESSMENT & PLAN NOTE
We had a discussion with Mr Frankie Lovelace after reviewing his imaging  He has a small anterior mediastinal mass with an undefined etiology  It could ectopic thyroid, lymphoma, or most likely a thymoma  However, secondary to his cardiac issues and insignificant size of this mass, Dr Vilma Heath is suggesting continued follow up  He will return in 3 months with a new CT scan of the chest with contrast  If this shows growth, we will discus robotic resection at that time  The patient is in agreement with the plan

## 2019-07-03 NOTE — LETTER
July 3, 2019     Thompson Ahumada, MD [address]    Patient: Charles Reina   YOB: 1955   Date of Visit: 7/3/2019       Dear Dr Terrance Adams:    Thank you for referring Charles Reina to me for evaluation  Below are my notes for this consultation  If you have questions, please do not hesitate to call me  I look forward to following your patient along with you  Sincerely,        Fariba Griffin MD        CC: DO Sima Monk PA-C  7/3/2019  9:07 AM  Attested  Thoracic Consult  Assessment/Plan:    Mediastinal mass  We had a discussion with Mr Porsha Murdock after reviewing his imaging  He has a small anterior mediastinal mass with an undefined etiology  It could ectopic thyroid, lymphoma, or most likely a thymoma  However, secondary to his cardiac issues and insignificant size of this mass, Dr Vernon Romero is suggesting continued follow up  He will return in 3 months with a new CT scan of the chest with contrast  If this shows growth, we will discus robotic resection at that time  The patient is in agreement with the plan  Diagnoses and all orders for this visit:    Mediastinal mass    Anterior mediastinal tumor  -     Ambulatory referral to Thoracic Surgery  -     CT chest w contrast; Future  -     BUN; Future  -     Creatinine, serum; Future          Thoracic History   Diagnosis: Mediastinal mass    Procedures/Surgeries:    Pathology:    Adjuvant Therapy:        Patient ID: Charles Reina is a 70 y o  male  Mr Porsha Murdock is a 69 yo gentleman with a history of HTN and newly diagnosed cardiomyopathy who was referred by Dr Terrance Adams for an incidental finding of a mediastinal mass  During a work up for palpitations, he underwent a CTA on 6/12/19  This revealed an anterior mediastinal mass measuring 1 4 x 1 0 cm, without any hilar or mediastinal adenopathy        On discussion, he had two episodes of palpitations before deciding to undergo an evaluation by his PCP  His echo from 5/29/19 revealed an EF of 35-40%  He has not had any further episodes since then  He denies fever, chills, weight loss, chest pain, or shortness of breath       HPI        Past Medical History:   Diagnosis Date    Cardiomyopathy ([address] )     Inguinal hernia, right     Irregular heartbeat     Kidney stones     Mediastinal mass     Anterior     Palpitations     Vertigo       Past Surgical History:   Procedure Laterality Date    HERNIA REPAIR        Family History   Problem Relation Age of Onset    Arthritis Mother     Hypertension Mother     Heart disease Mother         CHF   Liu Leech Gout Father     Cancer Father     Gout Family     Hypertension Brother     Gout Brother     Hypertension Brother     Gout Brother     Hypertension Brother     Hypertension Brother     Scoliosis Brother     Substance Abuse Neg Hx     Alcohol abuse Neg Hx     Mental illness Neg Hx       Social History     Socioeconomic History    Marital status: /Civil Union     Spouse name: Not on file    Number of children: Not on file    Years of education: Not on file    Highest education level: Not on file   Occupational History    Occupation: fulltime   Social Needs    Financial resource strain: Not on file    Food insecurity:     Worry: Not on file     Inability: Not on file    Transportation needs:     Medical: Not on file     Non-medical: Not on file   Tobacco Use    Smoking status: Never Smoker    Smokeless tobacco: Never Used    Tobacco comment: Former   Substance and Sexual Activity    Alcohol use: Yes     Frequency: 2-3 times a week     Drinks per session: 1 or 2     Binge frequency: Never    Drug use: No    Sexual activity: Not on file   Lifestyle    Physical activity:     Days per week: Not on file     Minutes per session: Not on file    Stress: Not on file   Relationships    Social connections:     Talks on phone: Not on file     Gets together: Not on file Attends Faith service: Not on file     Active member of club or organization: Not on file     Attends meetings of clubs or organizations: Not on file     Relationship status: Not on file    Intimate partner violence:     Fear of current or ex partner: Not on file     Emotionally abused: Not on file     Physically abused: Not on file     Forced sexual activity: Not on file   Other Topics Concern    Not on file   Social History Narrative    Not on file      Review of Systems   Constitutional: Negative for chills, fever and unexpected weight change  HENT: Negative for trouble swallowing and voice change  Respiratory: Negative for cough and shortness of breath  Cardiovascular: Negative for chest pain, palpitations and leg swelling  No recent palpitations    Gastrointestinal: Negative for nausea and vomiting  Musculoskeletal: Positive for back pain  Skin: Negative for pallor  Neurological: Negative for syncope and headaches  Psychiatric/Behavioral: Negative for agitation and behavioral problems  All other systems reviewed and are negative  Objective:   Physical Exam   Constitutional: He is oriented to person, place, and time  He appears well-developed  HENT:   Head: Normocephalic and atraumatic  Eyes: Pupils are equal, round, and reactive to light  No scleral icterus    + glasses   Neck: Normal range of motion  Neck supple  Cardiovascular: Normal rate and regular rhythm  Pulmonary/Chest: Effort normal and breath sounds normal  No respiratory distress  Musculoskeletal: Normal range of motion  Neurological: He is alert and oriented to person, place, and time  No cranial nerve deficit  Skin: Skin is warm and dry  He is not diaphoretic  Psychiatric: He has a normal mood and affect  His behavior is normal    Vitals reviewed      /70 (BP Location: Left arm, Patient Position: Sitting, Cuff Size: Adult)   Pulse 56   Temp 98 5 °F (36 9 °C)   Ht 5' 4 5" (1 638 m)   Wt 81 2 kg (179 lb)   BMI 30 25 kg/m²        Attestation signed by Sravani Bravo MD at 7/3/2019 11:11 AM:  Thoracic surgery attending note    Lindsay Dave is a 64-year-old male past medical history of hypertension and recently diagnosed cardiomyopathy who we are seeing in consultation from his cardiologist Dr Sydni Shelton for an incidentally found mediastinal mass  Lindsay Dave is recently suffered from palpitations prompting extensive workup  This revealed a 1 4 x 1 0 cm anterior mediastinal density  Besides the palpitations he is asymptomatic at this time  He has minimal chest pain currently  No shortness of breath  He denies any episodes of fevers or chills  No significant weight loss  On exam he has a anterior chest wall cyst as well palpated and visualized  This is mobile non firm  No overlying skin changes  Otherwise no abnormalities identified  A regular rate and rhythm  Normal work of breathing on room air    I personally reviewed the CT angiogram from 06/12/2019 went over this with the patient  He has a 1 4 x 1 0 cm anterior mediastinal soft tissue density  This is homogeneous in nature and differing from the surrounding fat  No other mediastinal or hilar adenopathy  He additionally has a subcutaneous cyst anterior to the sternum consistent with his physical exam      Transthoracic echocardiogram showed a diminished EF with 35-40%    Assessment - 64-year-old male with hypertension newly diagnosed cardiomyopathy who has a 1 4 x 1 0 cm anterior mediastinal soft tissue mass    Plan  - differential diagnosis for this is broad  Most likely this represents a anterior mediastinal thymoma  Additionally this could represent ectopic thyroid tissue or even lymphoma  These are much lower on the diagnosis at this time  Given the relative small size of this mass, lack invasion into surrounding structures, and newly diagnosed cardiomyopathy I would like to observe this    - plan on repeat contrast chest ct in 3 months with follow-up    Shari Shen MD

## 2019-07-09 ENCOUNTER — CLINICAL SUPPORT (OUTPATIENT)
Dept: FAMILY MEDICINE CLINIC | Facility: CLINIC | Age: 64
End: 2019-07-09
Payer: COMMERCIAL

## 2019-07-09 DIAGNOSIS — Z23 NEED FOR SHINGLES VACCINE: Primary | ICD-10-CM

## 2019-07-09 PROCEDURE — 90471 IMMUNIZATION ADMIN: CPT

## 2019-07-09 PROCEDURE — 90750 HZV VACC RECOMBINANT IM: CPT

## 2019-07-22 ENCOUNTER — TELEPHONE (OUTPATIENT)
Dept: CARDIOLOGY CLINIC | Facility: CLINIC | Age: 64
End: 2019-07-22

## 2019-07-22 NOTE — TELEPHONE ENCOUNTER
Spoke with patient regarding test results per Dr Theo Agustin       ----- Message from Chivo Leon MD sent at 7/16/2019  8:30 AM EDT -----  LV ejection fraction is about the same as his prior echocardiogram  No evidence of any type of infiltrative disease in his heart

## 2019-08-30 ENCOUNTER — TRANSCRIBE ORDERS (OUTPATIENT)
Dept: RADIOLOGY | Facility: HOSPITAL | Age: 64
End: 2019-08-30

## 2019-08-30 ENCOUNTER — HOSPITAL ENCOUNTER (OUTPATIENT)
Dept: RADIOLOGY | Facility: HOSPITAL | Age: 64
Discharge: HOME | End: 2019-08-30
Payer: COMMERCIAL

## 2019-08-30 ENCOUNTER — APPOINTMENT (OUTPATIENT)
Dept: LAB | Facility: HOSPITAL | Age: 64
End: 2019-08-30
Payer: COMMERCIAL

## 2019-08-30 DIAGNOSIS — D49.89 ANTERIOR MEDIASTINAL TUMOR: ICD-10-CM

## 2019-08-30 LAB
BUN SERPL-MCNC: 18 MG/DL (ref 5–25)
CREAT SERPL-MCNC: 0.81 MG/DL (ref 0.6–1.3)
GFR SERPL CREATININE-BSD FRML MDRD: 93 ML/MIN/1.73SQ M

## 2019-08-30 PROCEDURE — 84520 ASSAY OF UREA NITROGEN: CPT

## 2019-08-30 PROCEDURE — 82565 ASSAY OF CREATININE: CPT

## 2019-08-30 PROCEDURE — 36415 COLL VENOUS BLD VENIPUNCTURE: CPT

## 2019-09-03 ENCOUNTER — HOSPITAL ENCOUNTER (OUTPATIENT)
Dept: RADIOLOGY | Facility: HOSPITAL | Age: 64
Discharge: HOME | End: 2019-09-03
Payer: COMMERCIAL

## 2019-09-03 PROCEDURE — 71260 CT THORAX DX C+: CPT

## 2019-09-03 RX ADMIN — IODIXANOL 85 ML: 320 INJECTION, SOLUTION INTRAVASCULAR at 19:24

## 2019-09-18 ENCOUNTER — OFFICE VISIT (OUTPATIENT)
Dept: CARDIAC SURGERY | Facility: CLINIC | Age: 64
End: 2019-09-18
Payer: COMMERCIAL

## 2019-09-18 VITALS
HEIGHT: 65 IN | OXYGEN SATURATION: 96 % | HEART RATE: 61 BPM | SYSTOLIC BLOOD PRESSURE: 137 MMHG | DIASTOLIC BLOOD PRESSURE: 60 MMHG | WEIGHT: 179 LBS | TEMPERATURE: 97.9 F | BODY MASS INDEX: 29.82 KG/M2

## 2019-09-18 DIAGNOSIS — J98.59 MEDIASTINAL MASS: Primary | ICD-10-CM

## 2019-09-18 PROCEDURE — 99213 OFFICE O/P EST LOW 20 MIN: CPT | Performed by: THORACIC SURGERY (CARDIOTHORACIC VASCULAR SURGERY)

## 2019-09-18 NOTE — ASSESSMENT & PLAN NOTE
Mr Rossana Buitrago presents with a recent chest CT following an incidentally found anterior mediastinal mass in June 2019  Recent chest CT demonstrates complete stability of this mass without any new masses, pulmonary nodules or lymphadenopathy  This mass could represent a lymph node, ectopic thyroid tissue, or thymoma  Dr Mesa is recommending a 6 months chest CT with contrast to continue to monitor this  Alternative is surgery to remove, but his cardiac function is concerning, and since this mass is not growing, risk outweighs benefit  Patient is in agreement with this plan

## 2019-09-18 NOTE — LETTER
September 18, 2019     Ramirez Espinoza [facility] [address]    Patient: Jewel James   YOB: 1955   Date of Visit: 9/18/2019       Dear Dr Ana Moreira:    Thank you for referring Jewel James to me for evaluation  Below are my notes for this consultation  If you have questions, please do not hesitate to call me  I look forward to following your patient along with you  Sincerely,        Georgette Nick MD        CC: Dao Caballero DO  [place], [place]  9/18/2019  3:48 PM  Attested  Thoracic Follow-Up  Assessment/Plan:    Mediastinal mass  Mr Alexx Britt presents with a recent chest CT following an incidentally found anterior mediastinal mass in June 2019  Recent chest CT demonstrates complete stability of this mass without any new masses, pulmonary nodules or lymphadenopathy  This mass could represent a lymph node, ectopic thyroid tissue, or thymoma  Dr Sammy Meraz is recommending a 6 months chest CT with contrast to continue to monitor this  Alternative is surgery to remove, but his cardiac function is concerning, and since this mass is not growing, risk outweighs benefit  Patient is in agreement with this plan  Diagnoses and all orders for this visit:    Mediastinal mass  -     CT chest w contrast; Future  -     BUN; Future  -     Creatinine, serum; Future          Thoracic History      Diagnosis: Mediastinal mass    Procedures/Surgeries:     Pathology:     Adjuvant Therapy:             Subjective:    Patient ID: Jewel James is a 70 y o  male  HPI   Mr Alexx Britt is a 69 yo gentleman with a history of HTN and cardiomyopathy who was initially seen in our office 7/3/19 for evaluation of a small anterior mediastinal mass  During a work up for palpitations, he underwent a CTA on 6/12/19  This revealed an anterior mediastinal mass measuring 1 4 x 1 0 cm, without any hilar or mediastinal adenopathy   His echo from 5/29/19 revealed an EF of 35-40%  Dr Paul Washington recommended chest CT in 3 months to evaluate given its small size and his cardiac history  Chest CT with contrast 9/3/19 demonstrates [id number] anterior mediastinal mass that is stable in size  There are no pulmonary nodules or mediastinal/hilar lymphadenopathy  On discussion, he denies interval change in past medical history  He reports noticing palpitations occasionally, not daily  Denies chest pain  Reports dyspnea on exertion, but his back pain limits him more with activity than his breathing does  Denies fevers or chills  He denies muscle pains or weakness  Denies double vision  Cardiac MRI 7/1/19 with EF 34%  The following portions of the patient's history were reviewed and updated as appropriate: allergies, current medications, past family history, past medical history, past social history, past surgical history and problem list     Review of Systems   Constitutional: Negative for activity change, appetite change, chills and fever  Eyes: Negative for visual disturbance  Respiratory: Positive for shortness of breath (exertional)  Negative for cough and wheezing  Cardiovascular: Positive for palpitations  Negative for chest pain  Endocrine: Negative for cold intolerance and heat intolerance  Musculoskeletal: Positive for back pain  Negative for joint swelling and myalgias  Skin: Negative for color change and rash  Neurological: Negative for weakness and numbness  Hematological: Negative for adenopathy  Does not bruise/bleed easily  Objective:   Physical Exam   Constitutional: He is oriented to person, place, and time  He appears well-developed and well-nourished  HENT:   Head: Normocephalic and atraumatic  Nose: Nose normal    Eyes: Conjunctivae are normal  No scleral icterus  Neck: Neck supple  No tracheal deviation present  Cardiovascular: Normal rate, regular rhythm and normal heart sounds     Pulmonary/Chest: Effort normal and breath sounds normal  No respiratory distress  Lymphadenopathy:     He has no cervical adenopathy  Neurological: He is alert and oriented to person, place, and time  Skin: Skin is warm and dry  Psychiatric: He has a normal mood and affect  His behavior is normal  Judgment and thought content normal    /60 (BP Location: Left arm, Patient Position: Sitting, Cuff Size: Large)   Pulse 61   Temp 97 9 °F (36 6 °C)   Ht 5' 4 5" (1 638 m)   Wt 81 2 kg (179 lb)   SpO2 96%   BMI 30 25 kg/m²        Ct Chest W Contrast    Result Date: 9/10/2019  Narrative CT CHEST WITH IV CONTRAST INDICATION:   D49 89: Neoplasm of unspecified behavior of other specified sites  COMPARISON:  Cardiac CTA dated June 12, 2019  TECHNIQUE: CT examination of the chest was performed  Axial, sagittal, and coronal 2D reformatted images were created from the source data and submitted for interpretation  Radiation dose length product (DLP) for this visit:  456 05 mGy-cm   This examination, like all CT scans performed in the [facility], was performed utilizing techniques to minimize radiation dose exposure, including the use of iterative  reconstruction and automated exposure control  IV Contrast:  85 mL of iodixanol (VISIPAQUE) FINDINGS: LUNGS:  There is no infiltrate or pleural effusion  Mild diffuse emphysematous lung changes are present  There is no tracheal or endobronchial lesion  PLEURA:  Unremarkable  HEART/GREAT VESSELS:  Unremarkable for patient's age  MEDIASTINUM AND MADHURI:  Again noted is a 14 x 10 mm soft tissue nodule at the anterior mediastinum anterior to the distal ascending thoracic aorta (series 2, image 22) there is no hilar lymphadenopathy   CHEST WALL AND LOWER NECK:   Again noted is an oval lesion just deep to the skin surface to the right of midline anterior to the sternum measuring 2 7 x 1 8 cm and most compatible with a sebaceous cyst  VISUALIZED STRUCTURES IN THE UPPER ABDOMEN:  There is mild fatty infiltration of the visualized liver  OSSEOUS STRUCTURES:  No acute fracture or destructive osseous lesion  Impression Stable 14 x 10 mm anterior mediastinal soft tissue nodule  Differential considerations include prominent lymph node, thymoma, among other etiologies  A repeat CT chest in 6 months is recommended to assess for continued stability  No infiltrate or pleural effusion  Workstation performed: [id number]     Attestation signed by Bonny Poe MD at 9/18/2019  4:10 PM:  Thoracic surgery attending note    Patient seen and examined with PA this afternoon  No major complaints or changes since being seen in July 2019  Again this was an incidentally found 14 mm anterior mediastinal mass  This was found during workup for cardiomyopathy of unknown etiology  He is seen evaluated by Dr Ashley Andino in this regard  Currently dumb denies any significant chest pain or shortness of breath  No dyspnea on exertion  No pleuritic chest pain  He denies any fevers, chills or unexplained weight loss  No other new complaints at this time    On exam he has normal work of breathing on room air  Lungs clear to auscultation bilaterally  No cervical lymphadenopathy appreciated  No lower extremity edema    I personally reviewed his CT imaging and PACs and went over this with the patient  He has a stable 14 x 10 mm anterior mediastinal soft tissue density  No other significant lymphadenopathy  This is not invading into any of the mediastinal structures  This is completely surrounded by mediastinal fat at this point  Differential includes lymphadenopathy, ectopic thyroid tissue or possible thymoma  Assessment - 40-year-old male with cardiomyopathy and an incidentally found 14 x 10 mm anterior mediastinal soft tissue density that is been stable for over 3 months time    Plan  - I had a lengthy discussion with Dom regarding this anterior mediastinal mass    Again differential diagnosis for this includes benign lymphadenopathy, ectopic thyroid tissue, or thymoma  Given its stability over the last 3 months and lack of invasion into any surrounding structures I am recommending continued observation at this time  This is especially in light of his ongoing workup for cardiomyopathy  Most recent EF is approximately 35%  We discussed surgical resection of this mass  This is a relatively straightforward procedure that would be attempted robotically with three 8 mm incisions  Portions of this procedure include single lung ventilation and general anesthesia  Given that I would like to hold off for now with his ongoing workup/treatment for cardiomyopathy    - will closely follow this anterior mediastinal mass with repeat noncontrast CT of the chest in 6 months time  - Lavinia Calzada will get in touch with his cardiologist Dr Jackelyn Durbin for further cardiac workup    Bianca Shah MD

## 2019-09-18 NOTE — PROGRESS NOTES
Thoracic Follow-Up  Assessment/Plan:    Mediastinal mass  Mr Viviana Moody presents with a recent chest CT following an incidentally found anterior mediastinal mass in June 2019  Recent chest CT demonstrates complete stability of this mass without any new masses, pulmonary nodules or lymphadenopathy  This mass could represent a lymph node, ectopic thyroid tissue, or thymoma  Dr Anna Kirkland is recommending a 6 months chest CT with contrast to continue to monitor this  Alternative is surgery to remove, but his cardiac function is concerning, and since this mass is not growing, risk outweighs benefit  Patient is in agreement with this plan  Diagnoses and all orders for this visit:    Mediastinal mass  -     CT chest w contrast; Future  -     BUN; Future  -     Creatinine, serum; Future          Thoracic History      Diagnosis: Mediastinal mass    Procedures/Surgeries:     Pathology:     Adjuvant Therapy:             Subjective:    Patient ID: Chris March is a 70 y o  male  HPI   Mr Viviana Moody is a 69 yo gentleman with a history of HTN and cardiomyopathy who was initially seen in our office 7/3/19 for evaluation of a small anterior mediastinal mass  During a work up for palpitations, he underwent a CTA on 6/12/19  This revealed an anterior mediastinal mass measuring 1 4 x 1 0 cm, without any hilar or mediastinal adenopathy  His echo from 5/29/19 revealed an EF of 35-40%  Dr Anna Kirkland recommended chest CT in 3 months to evaluate given its small size and his cardiac history  Chest CT with contrast 9/3/19 demonstrates [id number] anterior mediastinal mass that is stable in size  There are no pulmonary nodules or mediastinal/hilar lymphadenopathy  On discussion, he denies interval change in past medical history  He reports noticing palpitations occasionally, not daily  Denies chest pain  Reports dyspnea on exertion, but his back pain limits him more with activity than his breathing does   Denies fevers or chills  He denies muscle pains or weakness  Denies double vision  Cardiac MRI 7/1/19 with EF 34%  The following portions of the patient's history were reviewed and updated as appropriate: allergies, current medications, past family history, past medical history, past social history, past surgical history and problem list     Review of Systems   Constitutional: Negative for activity change, appetite change, chills and fever  Eyes: Negative for visual disturbance  Respiratory: Positive for shortness of breath (exertional)  Negative for cough and wheezing  Cardiovascular: Positive for palpitations  Negative for chest pain  Endocrine: Negative for cold intolerance and heat intolerance  Musculoskeletal: Positive for back pain  Negative for joint swelling and myalgias  Skin: Negative for color change and rash  Neurological: Negative for weakness and numbness  Hematological: Negative for adenopathy  Does not bruise/bleed easily  Objective:   Physical Exam   Constitutional: He is oriented to person, place, and time  He appears well-developed and well-nourished  HENT:   Head: Normocephalic and atraumatic  Nose: Nose normal    Eyes: Conjunctivae are normal  No scleral icterus  Neck: Neck supple  No tracheal deviation present  Cardiovascular: Normal rate, regular rhythm and normal heart sounds  Pulmonary/Chest: Effort normal and breath sounds normal  No respiratory distress  Lymphadenopathy:     He has no cervical adenopathy  Neurological: He is alert and oriented to person, place, and time  Skin: Skin is warm and dry  Psychiatric: He has a normal mood and affect   His behavior is normal  Judgment and thought content normal    /60 (BP Location: Left arm, Patient Position: Sitting, Cuff Size: Large)   Pulse 61   Temp 97 9 °F (36 6 °C)   Ht 5' 4 5" (1 638 m)   Wt 81 2 kg (179 lb)   SpO2 96%   BMI 30 25 kg/m²       Ct Chest W Contrast    Result Date: 9/10/2019  Narrative CT CHEST WITH IV CONTRAST INDICATION:   D49 89: Neoplasm of unspecified behavior of other specified sites  COMPARISON:  Cardiac CTA dated June 12, 2019  TECHNIQUE: CT examination of the chest was performed  Axial, sagittal, and coronal 2D reformatted images were created from the source data and submitted for interpretation  Radiation dose length product (DLP) for this visit:  456 05 mGy-cm   This examination, like all CT scans performed in the [facility], was performed utilizing techniques to minimize radiation dose exposure, including the use of iterative  reconstruction and automated exposure control  IV Contrast:  85 mL of iodixanol (VISIPAQUE) FINDINGS: LUNGS:  There is no infiltrate or pleural effusion  Mild diffuse emphysematous lung changes are present  There is no tracheal or endobronchial lesion  PLEURA:  Unremarkable  HEART/GREAT VESSELS:  Unremarkable for patient's age  MEDIASTINUM AND MADHURI:  Again noted is a 14 x 10 mm soft tissue nodule at the anterior mediastinum anterior to the distal ascending thoracic aorta (series 2, image 22) there is no hilar lymphadenopathy  CHEST WALL AND LOWER NECK:   Again noted is an oval lesion just deep to the skin surface to the right of midline anterior to the sternum measuring 2 7 x 1 8 cm and most compatible with a sebaceous cyst  VISUALIZED STRUCTURES IN THE UPPER ABDOMEN:  There is mild fatty infiltration of the visualized liver  OSSEOUS STRUCTURES:  No acute fracture or destructive osseous lesion  Impression Stable 14 x 10 mm anterior mediastinal soft tissue nodule  Differential considerations include prominent lymph node, thymoma, among other etiologies  A repeat CT chest in 6 months is recommended to assess for continued stability  No infiltrate or pleural effusion   Workstation performed: [id number]

## 2019-11-04 DIAGNOSIS — I10 BENIGN HYPERTENSION: ICD-10-CM

## 2019-11-04 RX ORDER — VALSARTAN AND HYDROCHLOROTHIAZIDE 80; 12.5 MG/1; MG/1
TABLET, FILM COATED ORAL
Qty: 30 TABLET | Refills: 0 | Status: SHIPPED | OUTPATIENT
Start: 2019-11-04 | End: 2019-12-01 | Stop reason: SDUPTHER

## 2019-11-29 ENCOUNTER — OFFICE VISIT (OUTPATIENT)
Dept: CARDIOLOGY CLINIC | Facility: CLINIC | Age: 64
End: 2019-11-29
Payer: COMMERCIAL

## 2019-11-29 ENCOUNTER — TRANSCRIBE ORDERS (OUTPATIENT)
Dept: ADMINISTRATIVE | Facility: HOSPITAL | Age: 64
End: 2019-11-29

## 2019-11-29 VITALS
BODY MASS INDEX: 29.16 KG/M2 | SYSTOLIC BLOOD PRESSURE: 120 MMHG | HEIGHT: 65 IN | WEIGHT: 175 LBS | DIASTOLIC BLOOD PRESSURE: 60 MMHG | HEART RATE: 64 BPM

## 2019-11-29 DIAGNOSIS — I42.8 NONISCHEMIC CARDIOMYOPATHY (HCC): Primary | ICD-10-CM

## 2019-11-29 PROCEDURE — 99214 OFFICE O/P EST MOD 30 MIN: CPT | Performed by: INTERNAL MEDICINE

## 2019-11-29 RX ORDER — SPIRONOLACTONE 25 MG/1
25 TABLET ORAL DAILY
Qty: 30 TABLET | Refills: 5 | Status: SHIPPED | OUTPATIENT
Start: 2019-11-29 | End: 2020-05-22

## 2019-11-29 NOTE — PATIENT INSTRUCTIONS
1  We are adding spironolactone  Have lab work done two weeks  Also have the ferritin level as well that is already ordered  2  Have echo done in one month

## 2019-11-29 NOTE — PROGRESS NOTES
Cardiology Follow Up    Rohan Bledsoe  1955  [id number]  [address] [facility]  [phone]    No diagnosis found  Interval History: Followup cardiomyopathy    Testing reviewed  Minimal dyspnea with over exertion  No chest pain       Problem List     Arthritis    Benign hypertension    GERD without esophagitis    Gout    Hyperlipidemia    Other restrictive cardiomyopathy (HCC)    Mediastinal mass        Past Medical History:   Diagnosis Date    Cardiomyopathy ([address] )     Inguinal hernia, right     Irregular heartbeat     Kidney stones     Mediastinal mass     Anterior     Palpitations     Vertigo      Social History     Socioeconomic History    Marital status: /Civil Union     Spouse name: Not on file    Number of children: Not on file    Years of education: Not on file    Highest education level: Not on file   Occupational History    Occupation: fulltime   Social Needs    Financial resource strain: Not on file    Food insecurity:     Worry: Not on file     Inability: Not on file    Transportation needs:     Medical: Not on file     Non-medical: Not on file   Tobacco Use    Smoking status: Never Smoker    Smokeless tobacco: Never Used    Tobacco comment: Former   Substance and Sexual Activity    Alcohol use: Yes     Frequency: 2-3 times a week     Drinks per session: 1 or 2     Binge frequency: Never    Drug use: No    Sexual activity: Not on file   Lifestyle    Physical activity:     Days per week: Not on file     Minutes per session: Not on file    Stress: Not on file   Relationships    Social connections:     Talks on phone: Not on file     Gets together: Not on file     Attends Spiritism service: Not on file     Active member of club or organization: Not on file     Attends meetings of clubs or organizations: Not on file     Relationship status: Not on file    Intimate partner violence:     Fear of current or ex partner: Not on file     Emotionally abused: Not on file     Physically abused: Not on file     Forced sexual activity: Not on file   Other Topics Concern    Not on file   Social History Narrative    Not on file      Family History   Problem Relation Age of Onset    Arthritis Mother     Hypertension Mother     Heart disease Mother         CHF   Emaline Folds Gout Father     Cancer Father     Gout Family     Hypertension Brother     Gout Brother     Hypertension Brother     Gout Brother     Hypertension Brother     Hypertension Brother     Scoliosis Brother     Substance Abuse Neg Hx     Alcohol abuse Neg Hx     Mental illness Neg Hx      Past Surgical History:   Procedure Laterality Date    HERNIA REPAIR         Current Outpatient Medications:     allopurinol (ZYLOPRIM) 300 mg tablet, Take 1 tablet (300 mg total) by mouth daily, Disp: 90 tablet, Rfl: 3    metoprolol succinate (TOPROL-XL) 50 mg 24 hr tablet, Take 1 tablet (50 mg total) by mouth daily, Disp: 90 tablet, Rfl: 3    valsartan-hydrochlorothiazide (DIOVAN-HCT) 80-12 5 MG per tablet, TAKE ONE TABLET BY MOUTH EVERY DAY, Disp: 30 tablet, Rfl: 0  Allergies   Allergen Reactions    Lisinopril Swelling    Shellfish Allergy      Pt states it causes gout        Labs:     Chemistry        Component Value Date/Time     06/28/2017 0000    K 3 6 05/23/2019 0719    K 5 0 06/28/2017 0000     05/23/2019 0719     06/28/2017 0000    CO2 27 05/23/2019 0719    CO2 20 06/28/2017 0000    BUN 18 08/30/2019 0720    BUN 10 06/28/2017 0000    CREATININE 0 81 08/30/2019 0720    CREATININE 0 76 06/28/2017 0000        Component Value Date/Time    CALCIUM 8 6 05/23/2019 0719    CALCIUM 9 3 06/28/2017 0000    ALKPHOS 55 05/23/2019 0719    ALKPHOS 60 06/28/2017 0000    AST 22 05/23/2019 0719    AST 29 06/28/2017 0000    ALT 27 05/23/2019 0719    ALT 31 06/28/2017 0000    BILITOT 0 4 06/28/2017 0000            Lab Results   Component Value Date    CHOL 175 06/28/2017     Lab Results   Component Value Date    HDL 36 (L) 05/23/2019    HDL 44 06/28/2017     Lab Results   Component Value Date    LDLCALC 90 05/23/2019    LDLCALC 99 06/28/2017     Lab Results   Component Value Date    TRIG 204 (H) 05/23/2019    TRIG 160 (H) 06/28/2017     No results found for: CHOLHDL    Imaging: No results found  Review of Systems   Constitution: Negative  HENT: Negative  Eyes: Negative  Cardiovascular: Negative  Respiratory: Positive for shortness of breath  Endocrine: Negative  Hematologic/Lymphatic: Negative  Skin: Negative  Musculoskeletal: Negative  Gastrointestinal: Negative  Genitourinary: Negative  Neurological: Negative  Psychiatric/Behavioral: Negative  Allergic/Immunologic: Negative  Vitals:    11/29/19 0807   BP: 120/60   Pulse: 64           Physical Exam   Constitutional: He is oriented to person, place, and time  No distress  HENT:   Mouth/Throat: No oropharyngeal exudate  Eyes: No scleral icterus  Neck: No JVD present  Cardiovascular: Normal rate and regular rhythm  Exam reveals no gallop and no friction rub  No murmur heard  Pulmonary/Chest: Effort normal and breath sounds normal  No stridor  No respiratory distress  Abdominal: Soft  Bowel sounds are normal  He exhibits no distension  There is no tenderness  There is no guarding  Musculoskeletal: He exhibits no edema  Neurological: He is alert and oriented to person, place, and time  Skin: Skin is warm and dry  He is not diaphoretic  Psychiatric: He has a normal mood and affect  His behavior is normal        Discussion/Summary:       1  New onset Cardiomyopathy: He is essentially asymptomatic unless he does a large degree of exertion  Cardiac CT and Cardiac MRI are negative  He declined sleep study  Add spironolactone  Check limited echo for LVEF       2   Frequent PVCs / PACs / PSVT: Continue metoprolol  Minimal symptoms       3  Hypertension: His BP is under adequate control  The patient was counseled regarding diagnostic results, instructions for management, risk factor reductions, impressions  total time of encounter was 25 minutes and 15 minutes was spent counseling

## 2019-12-01 DIAGNOSIS — I10 BENIGN HYPERTENSION: ICD-10-CM

## 2019-12-02 RX ORDER — VALSARTAN AND HYDROCHLOROTHIAZIDE 80; 12.5 MG/1; MG/1
1 TABLET, FILM COATED ORAL DAILY
Qty: 30 TABLET | Refills: 0 | Status: SHIPPED | OUTPATIENT
Start: 2019-12-02 | End: 2019-12-31

## 2019-12-10 ENCOUNTER — OFFICE VISIT (OUTPATIENT)
Dept: FAMILY MEDICINE CLINIC | Facility: CLINIC | Age: 64
End: 2019-12-10
Payer: COMMERCIAL

## 2019-12-10 VITALS
RESPIRATION RATE: 14 BRPM | BODY MASS INDEX: 28.66 KG/M2 | HEART RATE: 68 BPM | WEIGHT: 172 LBS | SYSTOLIC BLOOD PRESSURE: 120 MMHG | HEIGHT: 65 IN | DIASTOLIC BLOOD PRESSURE: 80 MMHG

## 2019-12-10 DIAGNOSIS — I10 BENIGN HYPERTENSION: ICD-10-CM

## 2019-12-10 DIAGNOSIS — K21.9 GERD WITHOUT ESOPHAGITIS: ICD-10-CM

## 2019-12-10 DIAGNOSIS — I42.5 OTHER RESTRICTIVE CARDIOMYOPATHY (HCC): ICD-10-CM

## 2019-12-10 DIAGNOSIS — Z12.5 SCREENING PSA (PROSTATE SPECIFIC ANTIGEN): ICD-10-CM

## 2019-12-10 DIAGNOSIS — Z12.11 SCREENING FOR MALIGNANT NEOPLASM OF COLON: Primary | ICD-10-CM

## 2019-12-10 DIAGNOSIS — E78.2 MIXED HYPERLIPIDEMIA: ICD-10-CM

## 2019-12-10 PROCEDURE — 3079F DIAST BP 80-89 MM HG: CPT | Performed by: NURSE PRACTITIONER

## 2019-12-10 PROCEDURE — 99214 OFFICE O/P EST MOD 30 MIN: CPT | Performed by: NURSE PRACTITIONER

## 2019-12-10 PROCEDURE — 1036F TOBACCO NON-USER: CPT | Performed by: NURSE PRACTITIONER

## 2019-12-10 PROCEDURE — 3074F SYST BP LT 130 MM HG: CPT | Performed by: NURSE PRACTITIONER

## 2019-12-10 NOTE — PROGRESS NOTES
Chief Complaint   Patient presents with    Hyperlipidemia    Heartburn    Hypertension     Assessment/Plan:    1  Screening for malignant neoplasm of colon  FOB testing was given     2  GERD without esophagitis  This is quiescent with diet only   3  Benign hypertension  Good control with valsartan/HCTZ, metoprolol  4  Other restrictive cardiomyopathy ([address] )  Follows with cardiology   Is on spironlactone and metoprolol  5  Mixed hyperlipidemia  This is good with diet only   6  Screening PSA (prostate specific antigen)  Ordered for next lab draw  7  Mediastinal Mass  Follows with thoracic surgery and has an appt there in March for 6 month follow up    rto 6 months with labs prior   Subjective:      Patient ID: Lore Herrera is a 75 y o  male  Here today to cheryl on several chronic issues  Since las ov here was dx with both a mediastinal mass for which he is following with thoracic surgery and with cardiomyopathy   Is following with cardiology for that  Has follow ups with both of these in the next 3 months  Is following the mass with repeat CT in 3 months  Spironlactone and metoprolol were added for the cardiomyopathy  States he still has SOB but it is stable and has increased his exercise tolerance  Has no complaints              The following portions of the patient's history were reviewed and updated as appropriate: allergies, current medications, past family history, past medical history, past social history, past surgical history and problem list     Past Medical History:   Diagnosis Date    Cardiomyopathy ([address] )     Inguinal hernia, right     Irregular heartbeat     Kidney stones     Mediastinal mass     Anterior     Palpitations     Vertigo      Past Surgical History:   Procedure Laterality Date    HERNIA REPAIR       Family History   Problem Relation Age of Onset    Arthritis Mother     Hypertension Mother     Heart disease Mother         CHF   Jessika Cardozo Gout Father     Cancer Father    Jessika Cardozo Gout Family     Hypertension Brother     Gout Brother     Hypertension Brother     Gout Brother     Hypertension Brother     Hypertension Brother     Scoliosis Brother     Substance Abuse Neg Hx     Alcohol abuse Neg Hx     Mental illness Neg Hx      Social History   Social History     Socioeconomic History    Marital status: /Civil Union     Spouse name: Not on file    Number of children: Not on file    Years of education: Not on file    Highest education level: Not on file   Occupational History    Occupation: fulltime   Social Needs    Financial resource strain: Not on file    Food insecurity:     Worry: Not on file     Inability: Not on file   [employer] needs:     Medical: Not on file     Non-medical: Not on file   Tobacco Use    Smoking status: Never Smoker    Smokeless tobacco: Never Used    Tobacco comment: Former   Substance and Sexual Activity    Alcohol use: Yes     Frequency: 2-3 times a week     Drinks per session: 1 or 2     Binge frequency: Never    Drug use: No    Sexual activity: Not on file   Lifestyle    Physical activity:     Days per week: Not on file     Minutes per session: Not on file    Stress: Not on file   Relationships    Social connections:     Talks on phone: Not on file     Gets together: Not on file     Attends Rastafarian service: Not on file     Active member of club or organization: Not on file     Attends meetings of clubs or organizations: Not on file     Relationship status: Not on file    Intimate partner violence:     Fear of current or ex partner: Not on file     Emotionally abused: Not on file     Physically abused: Not on file     Forced sexual activity: Not on file   Other Topics Concern    Not on file   Social History Narrative    Not on file     Review of Systems   Constitutional: Negative  Respiratory: Negative  Cardiovascular: Negative  Musculoskeletal: Negative  Skin: Negative  Neurological: Negative  Psychiatric/Behavioral: Negative  Vitals:    12/10/19 0745   BP: 120/80   Pulse: 68   Resp: 14   Weight: 78 kg (172 lb)   Height: 5' 4 5" (1 638 m)       Objective: Wt Readings from Last 3 Encounters:   12/10/19 78 kg (172 lb)   11/29/19 79 4 kg (175 lb)   09/18/19 81 2 kg (179 lb)     BP Readings from Last 3 Encounters:   12/10/19 120/80   11/29/19 120/60   09/18/19 137/60     Pulse Readings from Last 3 Encounters:   12/10/19 68   11/29/19 64   09/18/19 61     BMI Readings from Last 3 Encounters:   12/10/19 29 07 kg/m²   11/29/19 29 57 kg/m²   09/18/19 30 25 kg/m²     Appointment on 08/30/2019   Component Date Value Ref Range Status    BUN 08/30/2019 18  5 - 25 mg/dL Final    Creatinine 08/30/2019 0 81  0 60 - 1 30 mg/dL Final    Standardized to IDMS reference method    eGFR 08/30/2019 93  ml/min/1 73sq m Final   Appointment on 05/23/2019   Component Date Value Ref Range Status    Cholesterol 05/23/2019 167  50 - 200 mg/dL Final      Cholesterol:       Desirable         <200 mg/dl       Borderline         200-239 mg/dl       High              >239           Triglycerides 05/23/2019 204* <=150 mg/dL Final      Triglyceride:     Normal          <150 mg/dl     Borderline High 150-199 mg/dl     High            200-499 mg/dl        Very High       >499 mg/dl    Specimen collection should occur prior to N-Acetylcysteine or Metamizole administration due to the potential for falsely depressed results   HDL, Direct 05/23/2019 36* 40 - 60 mg/dL Final      HDL Cholesterol:       High    >60 mg/dL       Low     <41 mg/dL  Specimen collection should occur prior to Metamizole administration due to the potential for falsley depressed results      LDL Calculated 05/23/2019 90  0 - 100 mg/dL Final      LDL Cholesterol:     Optimal           <100 mg/dl     Near Optimal      100-129 mg/dl     Above Optimal       Borderline High 130-159 mg/dl       High            160-189 mg/dl       Very High       >189 mg/dl         This screening LDL is a calculated result  It does not have the accuracy of the Direct Measured LDL in the monitoring of patients with hyperlipidemia and/or statin therapy  Direct Measure LDL (XZD469) must be ordered separately in these patients   Non-HDL-Chol (CHOL-HDL) 05/23/2019 131  mg/dl Final    TSH 3RD GENERATON 05/23/2019 3 160  0 358 - 3 740 uIU/mL Final      Using supplements with high doses of biotin 20 to more than 300 times greater than the adequate daily intake for adults of 30 mcg/day as established by the [employer], can cause falsely depress results      WBC 05/23/2019 6 05  4 31 - 10 16 Thousand/uL Final    RBC 05/23/2019 4 64  3 88 - 5 62 Million/uL Final    Hemoglobin 05/23/2019 14 6  12 0 - 17 0 g/dL Final    Hematocrit 05/23/2019 44 0  36 5 - 49 3 % Final    MCV 05/23/2019 95  82 - 98 fL Final    MCH 05/23/2019 31 5  26 8 - 34 3 pg Final    MCHC 05/23/2019 33 2  31 4 - 37 4 g/dL Final    RDW 05/23/2019 14 8  11 6 - 15 1 % Final    MPV 05/23/2019 10 8  8 9 - 12 7 fL Final    Platelets 62/07/1380 343  149 - 390 Thousands/uL Final    nRBC 05/23/2019 0  /100 WBCs Final    Neutrophils Relative 05/23/2019 55  43 - 75 % Final    Immat GRANS % 05/23/2019 0  0 - 2 % Final    Lymphocytes Relative 05/23/2019 33  14 - 44 % Final    Monocytes Relative 05/23/2019 10  4 - 12 % Final    Eosinophils Relative 05/23/2019 1  0 - 6 % Final    Basophils Relative 05/23/2019 1  0 - 1 % Final    Neutrophils Absolute 05/23/2019 3 37  1 85 - 7 62 Thousands/µL Final    Immature Grans Absolute 05/23/2019 0 01  0 00 - 0 20 Thousand/uL Final    Lymphocytes Absolute 05/23/2019 2 00  0 60 - 4 47 Thousands/µL Final    Monocytes Absolute 05/23/2019 0 58  0 17 - 1 22 Thousand/µL Final    Eosinophils Absolute 05/23/2019 0 05  0 00 - 0 61 Thousand/µL Final    Basophils Absolute 05/23/2019 0 04  0 00 - 0 10 Thousands/µL Final    Sodium 05/23/2019 138  136 - 145 mmol/L Final    Potassium 05/23/2019 3 6  3 5 - 5 3 mmol/L Final    Chloride 05/23/2019 106  100 - 108 mmol/L Final    CO2 05/23/2019 27  21 - 32 mmol/L Final    ANION GAP 05/23/2019 5  4 - 13 mmol/L Final    BUN 05/23/2019 16  5 - 25 mg/dL Final    Creatinine 05/23/2019 0 84  0 60 - 1 30 mg/dL Final    Standardized to IDMS reference method    Glucose 05/23/2019 99  65 - 140 mg/dL Final      If the patient is fasting, the ADA then defines impaired fasting glucose as > 100 mg/dL and diabetes as > or equal to 123 mg/dL  Specimen collection should occur prior to Sulfasalazine administration due to the potential for falsely depressed results  Specimen collection should occur prior to Sulfapyridine administration due to the potential for falsely elevated results   Calcium 05/23/2019 8 6  8 3 - 10 1 mg/dL Final    AST 05/23/2019 22  5 - 45 U/L Final      Specimen collection should occur prior to Sulfasalazine administration due to the potential for falsely depressed results   ALT 05/23/2019 27  12 - 78 U/L Final      Specimen collection should occur prior to Sulfasalazine and/or Sulfapyridine administration due to the potential for falsely depressed results   Alkaline Phosphatase 05/23/2019 55  46 - 116 U/L Final    Total Protein 05/23/2019 7 3  6 4 - 8 2 g/dL Final    Albumin 05/23/2019 3 9  3 5 - 5 0 g/dL Final    Total Bilirubin 05/23/2019 0 75  0 20 - 1 00 mg/dL Final    eGFR 05/23/2019 92  ml/min/1 73sq m Final    Prostate Specific Antigen Total 05/23/2019 1 0  0 0 - 4 0 ng/mL Final    Roche ECLIA methodology  According to the American Urological Association, Serum PSA should  decrease and remain at undetectable levels after radical  prostatectomy  The AUA defines biochemical recurrence as an initial  PSA value 0 2 ng/mL or greater followed by a subsequent confirmatory  PSA value 0 2 ng/mL or greater  Values obtained with different assay methods or kits cannot be used  interchangeably   Results cannot be interpreted as absolute evidence  of the presence or absence of malignant disease   PSA, Free 05/23/2019 0 34  N/A ng/mL Final    Roche ECLIA methodology   PSA, Free Pct 05/23/2019 34 0  % Final    The table below lists the probability of prostate cancer for  men with non-suspicious TY results and total PSA between  4 and 10 ng/mL, by patient age Olivia Miranda, [address] [facility],  [id number])  % Free PSA       50-64 yr        65-75 yr                    0 00-10 00%        56%             55%                   10 01-15 00%        24%             35%                   15 01-20 00%        17%             23%                   20 01-25 00%        10%             20%                        >25 00%         5%              9%  Please note:  Mireya et al did not make specific                recommendations regarding the use of                percent free PSA for any other population                of men  Appointment on 11/27/2018   Component Date Value Ref Range Status    QFT Nil 11/27/2018 0 02  0 - 8 0 IU/ml Final    QFT TB1-NIL 11/27/2018 0 00  IU/ml Final    QFT TB2-NIL 11/27/2018 -0 01  IU/ml Final    QFT Mitogen-NIL 11/27/2018 >10 00  IU/ml Final    QFT Final Interpretation 11/27/2018 Negative  Negative Final    No Interferon-gamma response to M  tuberculosis antigens detected  Infection with M  tuberculosis is unlikely  A single negative result does not exclude infection with M  tuberculosis  In patients at high risk for M  tuberculosis infection, a second test should be considered in accordance with the 2017 ATS/IDSA/CDC Clinical Practice Guidelines for Diagnosis of Tuberculosis in Adults and Children  False negative results can be a result of incorrect blood sample collection or handling of the specimen affecting lymphocyte function   Mumps IgG 11/27/2018 IMMUNE  IMMUNE Final    IMMUNE - Presumed immune to mumps infection      Rubeola IgG 11/27/2018 IMMUNE  IMMUNE Final    Rubella IgG Quant 11/27/2018 >175 0  >9 9 IU/mL Final    >    Varicella IgG 11/27/2018 IMMUNE  IMMUNE Final          Physical Exam   Constitutional: He is oriented to person, place, and time  He appears well-developed and well-nourished  HENT:   Right Ear: Tympanic membrane and ear canal normal    Left Ear: Tympanic membrane and ear canal normal    Neck: Normal range of motion  Neck supple  Cardiovascular: Normal rate, regular rhythm, normal heart sounds and intact distal pulses  Exam reveals no decreased pulses  Pulmonary/Chest: Effort normal and breath sounds normal  No respiratory distress  He has no wheezes  He has no rales  Musculoskeletal: Normal range of motion  Neurological: He is alert and oriented to person, place, and time  He has normal reflexes  No cranial nerve deficit  Skin: Skin is warm and dry  Psychiatric: He has a normal mood and affect   His behavior is normal  Judgment and thought content normal  Post-Care Instructions: I reviewed with the patient in detail post-care instructions. Patient is not to engage in any heavy lifting, exercise, or swimming for the next 14 days. Should the patient develop any fevers, chills, bleeding, severe pain patient will contact the office immediately.

## 2019-12-26 ENCOUNTER — APPOINTMENT (OUTPATIENT)
Dept: LAB | Facility: HOSPITAL | Age: 64
End: 2019-12-26
Payer: COMMERCIAL

## 2019-12-26 DIAGNOSIS — Z12.11 SCREENING FOR MALIGNANT NEOPLASM OF COLON: ICD-10-CM

## 2019-12-26 LAB — HEMOCCULT STL QL IA: NEGATIVE

## 2019-12-26 PROCEDURE — G0328 FECAL BLOOD SCRN IMMUNOASSAY: HCPCS

## 2019-12-31 DIAGNOSIS — M10.9 GOUT, UNSPECIFIED CAUSE, UNSPECIFIED CHRONICITY, UNSPECIFIED SITE: ICD-10-CM

## 2019-12-31 DIAGNOSIS — I10 BENIGN HYPERTENSION: ICD-10-CM

## 2019-12-31 RX ORDER — VALSARTAN AND HYDROCHLOROTHIAZIDE 80; 12.5 MG/1; MG/1
1 TABLET, FILM COATED ORAL DAILY
Qty: 30 TABLET | Refills: 0 | Status: SHIPPED | OUTPATIENT
Start: 2019-12-31 | End: 2020-01-31

## 2019-12-31 RX ORDER — ALLOPURINOL 300 MG/1
TABLET ORAL
Qty: 90 TABLET | Refills: 0 | OUTPATIENT
Start: 2019-12-31

## 2020-01-02 ENCOUNTER — TELEPHONE (OUTPATIENT)
Dept: CARDIOLOGY CLINIC | Facility: CLINIC | Age: 65
End: 2020-01-02

## 2020-01-02 DIAGNOSIS — M10.9 GOUT, UNSPECIFIED CAUSE, UNSPECIFIED CHRONICITY, UNSPECIFIED SITE: ICD-10-CM

## 2020-01-02 RX ORDER — ALLOPURINOL 300 MG/1
TABLET ORAL
Qty: 90 TABLET | Refills: 0 | OUTPATIENT
Start: 2020-01-02

## 2020-01-06 DIAGNOSIS — M10.9 GOUT, UNSPECIFIED CAUSE, UNSPECIFIED CHRONICITY, UNSPECIFIED SITE: ICD-10-CM

## 2020-01-06 RX ORDER — ALLOPURINOL 300 MG/1
300 TABLET ORAL DAILY
Qty: 90 TABLET | Refills: 0 | Status: SHIPPED | OUTPATIENT
Start: 2020-01-06 | End: 2020-06-08 | Stop reason: SDUPTHER

## 2020-01-06 NOTE — TELEPHONE ENCOUNTER
She keeps getting the refill notification because her name is linked to the last script that was sent in for allopurinol  In order for this to be fixed a new script needs to be sent from our practice

## 2020-01-14 ENCOUNTER — HOSPITAL ENCOUNTER (OUTPATIENT)
Dept: NON INVASIVE DIAGNOSTICS | Age: 65
Discharge: HOME | End: 2020-01-14
Payer: COMMERCIAL

## 2020-01-14 DIAGNOSIS — I42.8 NONISCHEMIC CARDIOMYOPATHY (HCC): ICD-10-CM

## 2020-01-14 PROCEDURE — 93308 TTE F-UP OR LMTD: CPT

## 2020-01-14 PROCEDURE — 93306 TTE W/DOPPLER COMPLETE: CPT | Performed by: INTERNAL MEDICINE

## 2020-01-22 ENCOUNTER — TELEPHONE (OUTPATIENT)
Dept: CARDIOLOGY CLINIC | Facility: CLINIC | Age: 65
End: 2020-01-22

## 2020-01-22 NOTE — TELEPHONE ENCOUNTER
----- Message from Cuco Abbott sent at 1/16/2020 11:04 AM EST -----      ----- Message -----  From: Werner Graham MD  Sent: 1/15/2020   9:21 AM EST  To: Cardiology [place] [facility]    His ejection fraction has improved  I'm happy with this result

## 2020-01-31 DIAGNOSIS — I10 BENIGN HYPERTENSION: ICD-10-CM

## 2020-01-31 RX ORDER — VALSARTAN AND HYDROCHLOROTHIAZIDE 80; 12.5 MG/1; MG/1
TABLET, FILM COATED ORAL
Qty: 30 TABLET | Refills: 5 | Status: SHIPPED | OUTPATIENT
Start: 2020-01-31 | End: 2020-07-28

## 2020-02-21 ENCOUNTER — OFFICE VISIT (OUTPATIENT)
Dept: CARDIOLOGY CLINIC | Facility: CLINIC | Age: 65
End: 2020-02-21
Payer: COMMERCIAL

## 2020-02-21 VITALS
WEIGHT: 175 LBS | SYSTOLIC BLOOD PRESSURE: 116 MMHG | HEIGHT: 65 IN | HEART RATE: 56 BPM | BODY MASS INDEX: 29.16 KG/M2 | DIASTOLIC BLOOD PRESSURE: 68 MMHG

## 2020-02-21 DIAGNOSIS — I42.0 DILATED CARDIOMYOPATHY (HCC): Primary | ICD-10-CM

## 2020-02-21 PROCEDURE — 1036F TOBACCO NON-USER: CPT | Performed by: INTERNAL MEDICINE

## 2020-02-21 PROCEDURE — 3078F DIAST BP <80 MM HG: CPT | Performed by: INTERNAL MEDICINE

## 2020-02-21 PROCEDURE — 3074F SYST BP LT 130 MM HG: CPT | Performed by: INTERNAL MEDICINE

## 2020-02-21 PROCEDURE — 3008F BODY MASS INDEX DOCD: CPT | Performed by: INTERNAL MEDICINE

## 2020-02-21 PROCEDURE — 1160F RVW MEDS BY RX/DR IN RCRD: CPT | Performed by: INTERNAL MEDICINE

## 2020-02-21 PROCEDURE — 99214 OFFICE O/P EST MOD 30 MIN: CPT | Performed by: INTERNAL MEDICINE

## 2020-02-21 NOTE — PROGRESS NOTES
Cardiology Follow Up    Rohan Bledsoe  1955  [id number]  [address] [facility]  [phone]    No diagnosis found  Interval History: Followup for cardiomyopathy    He is doing well  No chest pain, no dyspnea and no palpitations  He is doing well with medical therapy       Problem List     Arthritis    Benign hypertension    GERD without esophagitis    Gout    Hyperlipidemia    Other restrictive cardiomyopathy (HCC)    Mediastinal mass        Past Medical History:   Diagnosis Date    Cardiomyopathy ([address] )     Inguinal hernia, right     Irregular heartbeat     Kidney stones     Mediastinal mass     Anterior     Palpitations     Vertigo      Social History     Socioeconomic History    Marital status: /Civil Union     Spouse name: Not on file    Number of children: Not on file    Years of education: Not on file    Highest education level: Not on file   Occupational History    Occupation: fulltime   Social Needs    Financial resource strain: Not on file    Food insecurity:     Worry: Not on file     Inability: Not on file    Transportation needs:     Medical: Not on file     Non-medical: Not on file   Tobacco Use    Smoking status: Never Smoker    Smokeless tobacco: Never Used    Tobacco comment: Former   Substance and Sexual Activity    Alcohol use: Yes     Frequency: 2-3 times a week     Drinks per session: 1 or 2     Binge frequency: Never    Drug use: No    Sexual activity: Not on file   Lifestyle    Physical activity:     Days per week: Not on file     Minutes per session: Not on file    Stress: Not on file   Relationships    Social connections:     Talks on phone: Not on file     Gets together: Not on file     Attends Hoahaoism service: Not on file     Active member of club or organization: Not on file     Attends meetings of clubs or organizations: Not on file     Relationship status: Not on file    Intimate partner violence:     Fear of current or ex partner: Not on file     Emotionally abused: Not on file     Physically abused: Not on file     Forced sexual activity: Not on file   Other Topics Concern    Not on file   Social History Narrative    Not on file      Family History   Problem Relation Age of Onset    Arthritis Mother     Hypertension Mother     Heart disease Mother         CHF   Jewels Boateng Gout Father     Cancer Father     Gout Family     Hypertension Brother     Gout Brother     Hypertension Brother     Gout Brother     Hypertension Brother     Hypertension Brother     Scoliosis Brother     Substance Abuse Neg Hx     Alcohol abuse Neg Hx     Mental illness Neg Hx      Past Surgical History:   Procedure Laterality Date    HERNIA REPAIR         Current Outpatient Medications:     allopurinol (ZYLOPRIM) 300 mg tablet, Take 1 tablet (300 mg total) by mouth daily, Disp: 90 tablet, Rfl: 0    metoprolol succinate (TOPROL-XL) 50 mg 24 hr tablet, Take 1 tablet (50 mg total) by mouth daily, Disp: 90 tablet, Rfl: 3    spironolactone (ALDACTONE) 25 mg tablet, Take 1 tablet (25 mg total) by mouth daily, Disp: 30 tablet, Rfl: 5    valsartan-hydrochlorothiazide (DIOVAN-HCT) 80-12 5 MG per tablet, TAKE ONE TABLET BY MOUTH DAILY, Disp: 30 tablet, Rfl: 5  Allergies   Allergen Reactions    Lisinopril Swelling    Shellfish Allergy      Pt states it causes gout        Labs:     Chemistry        Component Value Date/Time     06/28/2017 0000    K 3 6 05/23/2019 0719    K 5 0 06/28/2017 0000     05/23/2019 0719     06/28/2017 0000    CO2 27 05/23/2019 0719    CO2 20 06/28/2017 0000    BUN 18 08/30/2019 0720    BUN 10 06/28/2017 0000    CREATININE 0 81 08/30/2019 0720    CREATININE 0 76 06/28/2017 0000        Component Value Date/Time    CALCIUM 8 6 05/23/2019 0719    CALCIUM 9 3 06/28/2017 0000    ALKPHOS 55 05/23/2019 0719    ALKPHOS 60 06/28/2017 0000    AST 22 05/23/2019 0719    AST 29 06/28/2017 0000    ALT 27 05/23/2019 0719    ALT 31 06/28/2017 0000    BILITOT 0 4 06/28/2017 0000            Lab Results   Component Value Date    CHOL 175 06/28/2017     Lab Results   Component Value Date    HDL 36 (L) 05/23/2019    HDL 44 06/28/2017     Lab Results   Component Value Date    LDLCALC 90 05/23/2019    LDLCALC 99 06/28/2017     Lab Results   Component Value Date    TRIG 204 (H) 05/23/2019    TRIG 160 (H) 06/28/2017     No results found for: CHOLHDL    Imaging: No results found  Review of Systems   Constitution: Negative  HENT: Negative  Eyes: Negative  Cardiovascular: Negative  Respiratory: Negative  Endocrine: Negative  Hematologic/Lymphatic: Negative  Skin: Negative  Musculoskeletal: Negative  Gastrointestinal: Negative  Genitourinary: Negative  Neurological: Negative  Psychiatric/Behavioral: Negative  Allergic/Immunologic: Negative  Vitals:    02/21/20 0747   BP: 116/68   Pulse: 56           Physical Exam   Constitutional: He is oriented to person, place, and time  No distress  HENT:   Mouth/Throat: No oropharyngeal exudate  Eyes: No scleral icterus  Neck: No JVD present  Cardiovascular: Normal rate and regular rhythm  Exam reveals no friction rub  No murmur heard  Pulmonary/Chest: Effort normal and breath sounds normal  No stridor  No respiratory distress  He has no wheezes  Abdominal: Soft  Bowel sounds are normal  He exhibits no distension  There is no tenderness  There is no guarding  Musculoskeletal: He exhibits no edema  Neurological: He is alert and oriented to person, place, and time  Skin: Skin is warm and dry  He is not diaphoretic  Psychiatric: He has a normal mood and affect  His behavior is normal        Discussion/Summary:    1  New onset Cardiomyopathy: He is essentially asymptomatic unless he does a large degree of exertion   Cardiac CT and Cardiac MRI are negative  His LVEF is now 45%       2  Frequent PVCs / PACs / PSVT: Continue metoprolol  Minimal symptoms       3  Hypertension: His BP is under adequate control  The patient was counseled regarding diagnostic results, instructions for management, risk factor reductions, impressions  total time of encounter was 25 minutes and 15 minutes was spent counseling

## 2020-02-25 ENCOUNTER — OFFICE VISIT (OUTPATIENT)
Dept: FAMILY MEDICINE CLINIC | Facility: CLINIC | Age: 65
End: 2020-02-25
Payer: COMMERCIAL

## 2020-02-25 VITALS
HEART RATE: 64 BPM | WEIGHT: 171 LBS | BODY MASS INDEX: 28.49 KG/M2 | RESPIRATION RATE: 14 BRPM | HEIGHT: 65 IN | SYSTOLIC BLOOD PRESSURE: 100 MMHG | DIASTOLIC BLOOD PRESSURE: 60 MMHG

## 2020-02-25 DIAGNOSIS — K40.90 HERNIA, INGUINAL, RIGHT: ICD-10-CM

## 2020-02-25 DIAGNOSIS — I10 BENIGN HYPERTENSION: ICD-10-CM

## 2020-02-25 DIAGNOSIS — J98.59 MEDIASTINAL MASS: ICD-10-CM

## 2020-02-25 DIAGNOSIS — E78.2 MIXED HYPERLIPIDEMIA: ICD-10-CM

## 2020-02-25 DIAGNOSIS — I42.5 OTHER RESTRICTIVE CARDIOMYOPATHY (HCC): ICD-10-CM

## 2020-02-25 DIAGNOSIS — K21.9 GERD WITHOUT ESOPHAGITIS: Primary | ICD-10-CM

## 2020-02-25 PROCEDURE — 1036F TOBACCO NON-USER: CPT | Performed by: NURSE PRACTITIONER

## 2020-02-25 PROCEDURE — 99214 OFFICE O/P EST MOD 30 MIN: CPT | Performed by: NURSE PRACTITIONER

## 2020-02-25 PROCEDURE — 3008F BODY MASS INDEX DOCD: CPT | Performed by: NURSE PRACTITIONER

## 2020-02-25 PROCEDURE — 3078F DIAST BP <80 MM HG: CPT | Performed by: NURSE PRACTITIONER

## 2020-02-25 PROCEDURE — 3074F SYST BP LT 130 MM HG: CPT | Performed by: NURSE PRACTITIONER

## 2020-02-25 PROCEDURE — 1160F RVW MEDS BY RX/DR IN RCRD: CPT | Performed by: NURSE PRACTITIONER

## 2020-02-25 NOTE — PROGRESS NOTES
Chief Complaint   Patient presents with    discuss hernia     Assessment/Plan:    1  GERD without esophagitis  Manages with diet only    2  Benign hypertension  Good with the valsartan, spirolactone and metoprolol  3  Other restrictive cardiomyopathy ([address] )  Follows with cardiology  Is on spironolactone and metoprolol     4  Mixed hyperlipidemia  This is good with diet only     5  Mediastinal mass  Follows with Thoracic surgery and has a repeat ct and cheryl in March 6  Hernia, inguinal, right  Referred to general surgery for evaluation  - Ambulatory referral to General Surgery; Future      BMI Counseling: Body mass index is 28 9 kg/m²  The BMI is above normal  Nutrition recommendations include reducing portion sizes, decreasing overall calorie intake, 3-5 servings of fruits/vegetables daily and reducing fast food intake  Exercise recommendations include moderate aerobic physical activity for 150 minutes/week  Subjective:      Patient ID: Esdras Cleary is a 75 y o  male  Here today to cheryl on several chronic issues   has  Ct scan of this chest to follow mediastainal mass scheduled for March  This is to assure stability   Saw Dr Sarah Medina as well for his cardiomyopathy  Ejection fraction I s improved  Only concern is a ? Hernia in the R inguinal area   Has bothered him for a couple years but seems to be getting worse         The following portions of the patient's history were reviewed and updated as appropriate: allergies, current medications, past family history, past medical history, past social history, past surgical history and problem list     Past Medical History:   Diagnosis Date    Cardiomyopathy ([address] )     Inguinal hernia, right     Irregular heartbeat     Kidney stones     Mediastinal mass     Anterior     Palpitations     Vertigo      Past Surgical History:   Procedure Laterality Date    HERNIA REPAIR       Family History   Problem Relation Age of Onset    Arthritis Mother    Cally Terry Hypertension Mother     Heart disease Mother         CHF   Neil Drop Gout Father     Cancer Father     Gout Family     Hypertension Brother     Gout Brother     Hypertension Brother     Gout Brother     Hypertension Brother     Hypertension Brother     Scoliosis Brother     Substance Abuse Neg Hx     Alcohol abuse Neg Hx     Mental illness Neg Hx      Social History   Social History     Socioeconomic History    Marital status: /Civil Union     Spouse name: Not on file    Number of children: Not on file    Years of education: Not on file    Highest education level: Not on file   Occupational History    Occupation: fulltime   Social Needs    Financial resource strain: Not on file    Food insecurity:     Worry: Not on file     Inability: Not on file   [employer] needs:     Medical: Not on file     Non-medical: Not on file   Tobacco Use    Smoking status: Never Smoker    Smokeless tobacco: Never Used    Tobacco comment: Former   Substance and Sexual Activity    Alcohol use: Yes     Frequency: 2-3 times a week     Drinks per session: 1 or 2     Binge frequency: Never    Drug use: No    Sexual activity: Not on file   Lifestyle    Physical activity:     Days per week: Not on file     Minutes per session: Not on file    Stress: Not on file   Relationships    Social connections:     Talks on phone: Not on file     Gets together: Not on file     Attends Denominational service: Not on file     Active member of club or organization: Not on file     Attends meetings of clubs or organizations: Not on file     Relationship status: Not on file    Intimate partner violence:     Fear of current or ex partner: Not on file     Emotionally abused: Not on file     Physically abused: Not on file     Forced sexual activity: Not on file   Other Topics Concern    Not on file   Social History Narrative    Not on file     Review of Systems   Constitutional: Negative  Respiratory: Negative      Cardiovascular: Negative  Musculoskeletal: Negative  Skin: Negative  Neurological: Negative  Psychiatric/Behavioral: Negative  Vitals:    02/25/20 0836   BP: 100/60   Pulse: 64   Resp: 14   Weight: 77 6 kg (171 lb)   Height: 5' 4 5" (1 638 m)       Objective: Wt Readings from Last 3 Encounters:   02/25/20 77 6 kg (171 lb)   02/21/20 79 4 kg (175 lb)   12/10/19 78 kg (172 lb)     BP Readings from Last 3 Encounters:   02/25/20 100/60   02/21/20 116/68   12/10/19 120/80     Pulse Readings from Last 3 Encounters:   02/25/20 64   02/21/20 56   12/10/19 68     BMI Readings from Last 3 Encounters:   02/25/20 28 90 kg/m²   02/21/20 29 57 kg/m²   12/10/19 29 07 kg/m²     Appointment on 12/26/2019   Component Date Value Ref Range Status    OCCULT BLD, FECAL IMMUNOLOGICAL 12/26/2019 Negative  Negative Final   Appointment on 08/30/2019   Component Date Value Ref Range Status    BUN 08/30/2019 18  5 - 25 mg/dL Final    Creatinine 08/30/2019 0 81  0 60 - 1 30 mg/dL Final    Standardized to IDMS reference method    eGFR 08/30/2019 93  ml/min/1 73sq m Final   Appointment on 05/23/2019   Component Date Value Ref Range Status    Cholesterol 05/23/2019 167  50 - 200 mg/dL Final      Cholesterol:       Desirable         <200 mg/dl       Borderline         200-239 mg/dl       High              >239           Triglycerides 05/23/2019 204* <=150 mg/dL Final      Triglyceride:     Normal          <150 mg/dl     Borderline High 150-199 mg/dl     High            200-499 mg/dl        Very High       >499 mg/dl    Specimen collection should occur prior to N-Acetylcysteine or Metamizole administration due to the potential for falsely depressed results   HDL, Direct 05/23/2019 36* 40 - 60 mg/dL Final      HDL Cholesterol:       High    >60 mg/dL       Low     <41 mg/dL  Specimen collection should occur prior to Metamizole administration due to the potential for falsley depressed results      LDL Calculated 05/23/2019 90  0 - 100 mg/dL Final      LDL Cholesterol:     Optimal           <100 mg/dl     Near Optimal      100-129 mg/dl     Above Optimal       Borderline High 130-159 mg/dl       High            160-189 mg/dl       Very High       >189 mg/dl         This screening LDL is a calculated result  It does not have the accuracy of the Direct Measured LDL in the monitoring of patients with hyperlipidemia and/or statin therapy  Direct Measure LDL (FZX852) must be ordered separately in these patients   Non-HDL-Chol (CHOL-HDL) 05/23/2019 131  mg/dl Final    TSH 3RD GENERATON 05/23/2019 3 160  0 358 - 3 740 uIU/mL Final      Using supplements with high doses of biotin 20 to more than 300 times greater than the adequate daily intake for adults of 30 mcg/day as established by the [place] [employer], can cause falsely depress results      WBC 05/23/2019 6 05  4 31 - 10 16 Thousand/uL Final    RBC 05/23/2019 4 64  3 88 - 5 62 Million/uL Final    Hemoglobin 05/23/2019 14 6  12 0 - 17 0 g/dL Final    Hematocrit 05/23/2019 44 0  36 5 - 49 3 % Final    MCV 05/23/2019 95  82 - 98 fL Final    MCH 05/23/2019 31 5  26 8 - 34 3 pg Final    MCHC 05/23/2019 33 2  31 4 - 37 4 g/dL Final    RDW 05/23/2019 14 8  11 6 - 15 1 % Final    MPV 05/23/2019 10 8  8 9 - 12 7 fL Final    Platelets 41/36/8438 343  149 - 390 Thousands/uL Final    nRBC 05/23/2019 0  /100 WBCs Final    Neutrophils Relative 05/23/2019 55  43 - 75 % Final    Immat GRANS % 05/23/2019 0  0 - 2 % Final    Lymphocytes Relative 05/23/2019 33  14 - 44 % Final    Monocytes Relative 05/23/2019 10  4 - 12 % Final    Eosinophils Relative 05/23/2019 1  0 - 6 % Final    Basophils Relative 05/23/2019 1  0 - 1 % Final    Neutrophils Absolute 05/23/2019 3 37  1 85 - 7 62 Thousands/µL Final    Immature Grans Absolute 05/23/2019 0 01  0 00 - 0 20 Thousand/uL Final    Lymphocytes Absolute 05/23/2019 2 00  0 60 - 4 47 Thousands/µL Final    Monocytes Absolute 05/23/2019 0 58  0 17 - 1 22 Thousand/µL Final    Eosinophils Absolute 05/23/2019 0 05  0 00 - 0 61 Thousand/µL Final    Basophils Absolute 05/23/2019 0 04  0 00 - 0 10 Thousands/µL Final    Sodium 05/23/2019 138  136 - 145 mmol/L Final    Potassium 05/23/2019 3 6  3 5 - 5 3 mmol/L Final    Chloride 05/23/2019 106  100 - 108 mmol/L Final    CO2 05/23/2019 27  21 - 32 mmol/L Final    ANION GAP 05/23/2019 5  4 - 13 mmol/L Final    BUN 05/23/2019 16  5 - 25 mg/dL Final    Creatinine 05/23/2019 0 84  0 60 - 1 30 mg/dL Final    Standardized to IDMS reference method    Glucose 05/23/2019 99  65 - 140 mg/dL Final      If the patient is fasting, the ADA then defines impaired fasting glucose as > 100 mg/dL and diabetes as > or equal to 123 mg/dL  Specimen collection should occur prior to Sulfasalazine administration due to the potential for falsely depressed results  Specimen collection should occur prior to Sulfapyridine administration due to the potential for falsely elevated results   Calcium 05/23/2019 8 6  8 3 - 10 1 mg/dL Final    AST 05/23/2019 22  5 - 45 U/L Final      Specimen collection should occur prior to Sulfasalazine administration due to the potential for falsely depressed results   ALT 05/23/2019 27  12 - 78 U/L Final      Specimen collection should occur prior to Sulfasalazine and/or Sulfapyridine administration due to the potential for falsely depressed results   Alkaline Phosphatase 05/23/2019 55  46 - 116 U/L Final    Total Protein 05/23/2019 7 3  6 4 - 8 2 g/dL Final    Albumin 05/23/2019 3 9  3 5 - 5 0 g/dL Final    Total Bilirubin 05/23/2019 0 75  0 20 - 1 00 mg/dL Final    eGFR 05/23/2019 92  ml/min/1 73sq m Final    Prostate Specific Antigen Total 05/23/2019 1 0  0 0 - 4 0 ng/mL Final    Roche ECLIA methodology  According to the American Urological Association, Serum PSA should  decrease and remain at undetectable levels after radical  prostatectomy   The AUA defines biochemical recurrence as an initial  PSA value 0 2 ng/mL or greater followed by a subsequent confirmatory  PSA value 0 2 ng/mL or greater  Values obtained with different assay methods or kits cannot be used  interchangeably  Results cannot be interpreted as absolute evidence  of the presence or absence of malignant disease   PSA, Free 05/23/2019 0 34  N/A ng/mL Final    Roche ECLIA methodology   PSA, Free Pct 05/23/2019 34 0  % Final    The table below lists the probability of prostate cancer for  men with non-suspicious TY results and total PSA between  4 and 10 ng/mL, by patient age Alida Mcdonald, [address] [facility] [place],  [id number])  % Free PSA       50-64 yr        65-75 yr                    0 00-10 00%        56%             55%                   10 01-15 00%        24%             35%                   15 01-20 00%        17%             23%                   20 01-25 00%        10%             20%                        >25 00%         5%              9%  Please note:  Mireya et al did not make specific                recommendations regarding the use of                percent free PSA for any other population                of men  Appointment on 11/27/2018   Component Date Value Ref Range Status    QFT Nil 11/27/2018 0 02  0 - 8 0 IU/ml Final    QFT TB1-NIL 11/27/2018 0 00  IU/ml Final    QFT TB2-NIL 11/27/2018 -0 01  IU/ml Final    QFT Mitogen-NIL 11/27/2018 >10 00  IU/ml Final    QFT Final Interpretation 11/27/2018 Negative  Negative Final    No Interferon-gamma response to M  tuberculosis antigens detected  Infection with M  tuberculosis is unlikely  A single negative result does not exclude infection with M  tuberculosis  In patients at high risk for M  tuberculosis infection, a second test should be considered in accordance with the 2017 ATS/IDSA/CDC Clinical Practice Guidelines for Diagnosis of Tuberculosis in Adults and Children   False negative results can be a result of incorrect blood sample collection or handling of the specimen affecting lymphocyte function   Mumps IgG 11/27/2018 IMMUNE  IMMUNE Final    IMMUNE - Presumed immune to mumps infection   Rubeola IgG 11/27/2018 IMMUNE  IMMUNE Final    Rubella IgG Quant 11/27/2018 >175 0  >9 9 IU/mL Final    >    Varicella IgG 11/27/2018 IMMUNE  IMMUNE Final          Physical Exam   Constitutional: He is oriented to person, place, and time  He appears well-developed and well-nourished  HENT:   Right Ear: Tympanic membrane and ear canal normal    Left Ear: Tympanic membrane and ear canal normal    Neck: Normal range of motion  Neck supple  Cardiovascular: Normal rate, regular rhythm, normal heart sounds and intact distal pulses  Exam reveals no decreased pulses  Pulmonary/Chest: Effort normal and breath sounds normal  No respiratory distress  He has no wheezes  He has no rales  Genitourinary:   Genitourinary Comments: R inguinal hernia   Musculoskeletal: Normal range of motion  Neurological: He is alert and oriented to person, place, and time  He has normal reflexes  No cranial nerve deficit  Skin: Skin is warm and dry  Psychiatric: He has a normal mood and affect   His behavior is normal  Judgment and thought content normal

## 2020-03-11 ENCOUNTER — CONSULT (OUTPATIENT)
Dept: SURGERY | Facility: CLINIC | Age: 65
End: 2020-03-11
Payer: COMMERCIAL

## 2020-03-11 VITALS
HEART RATE: 59 BPM | TEMPERATURE: 97.1 F | WEIGHT: 170.2 LBS | HEIGHT: 65 IN | BODY MASS INDEX: 28.36 KG/M2 | SYSTOLIC BLOOD PRESSURE: 120 MMHG | DIASTOLIC BLOOD PRESSURE: 78 MMHG

## 2020-03-11 DIAGNOSIS — K40.90 HERNIA, INGUINAL, RIGHT: ICD-10-CM

## 2020-03-11 DIAGNOSIS — K40.20 NON-RECURRENT BILATERAL INGUINAL HERNIA WITHOUT OBSTRUCTION OR GANGRENE: Primary | ICD-10-CM

## 2020-03-11 PROCEDURE — 99244 OFF/OP CNSLTJ NEW/EST MOD 40: CPT | Performed by: SURGERY

## 2020-03-11 RX ORDER — HEPARIN SODIUM 5000 [USP'U]/ML
5000 INJECTION, SOLUTION INTRAVENOUS; SUBCUTANEOUS ONCE
Status: CANCELLED | OUTPATIENT
Start: 2020-03-11 | End: 2020-03-11

## 2020-03-11 NOTE — PROGRESS NOTES
Office Visit - General Surgery  Early Ours MRN: [id number]  Encounter: [id number]    Assessment and Plan    Problem List Items Addressed This Visit        Other    Non-recurrent bilateral inguinal hernia without obstruction or gangrene - Primary     There are bilateral inguinal hernias noted  I discussed with him what a hernia is and how it would be repaired  I discussed the surgery in detail including risks, benefits, alternatives, and what to expect postoperatively  I told the ideal method would be a laparoscopic or robotic inguinal hernia repair  He understands and is agreeable  Plan:  Robot or laparoscopic bilateral inguinal hernia repair           Other Visit Diagnoses     Hernia, inguinal, right              Chief Complaint:  Early Ours is a 75 y o  male who presents for Consult (Right inguinal hernia )    Subjective  70-year-old male whose had a right inguinal hernia for at least 20 years  Over the last few months he has noted bothering him more  More 0 a discomfort than anything  No change in bowel movements no other complaints or problems related to this      Past Medical History  Past Medical History:   Diagnosis Date    Cardiomyopathy ([address] )     Inguinal hernia, right     Irregular heartbeat     Kidney stones     Mediastinal mass     Anterior     Palpitations     Vertigo        Past Surgical History  Past Surgical History:   Procedure Laterality Date    HERNIA REPAIR         Family History  Family History   Problem Relation Age of Onset   Cruz Arthritis Mother     Hypertension Mother     Heart disease Mother         CHF   Cruz Gout Father     Cancer Father     Gout Family     Hypertension Brother     Gout Brother     Hypertension Brother     Gout Brother     Hypertension Brother     Hypertension Brother     Scoliosis Brother     Substance Abuse Neg Hx     Alcohol abuse Neg Hx     Mental illness Neg Hx        Social History  Social History     Socioeconomic History  Marital status: /Civil Union     Spouse name: None    Number of children: None    Years of education: None    Highest education level: None   Occupational History    Occupation: fulltime   Social Needs    Financial resource strain: None    Food insecurity:     Worry: None     Inability: None    Transportation needs:     Medical: None     Non-medical: None   Tobacco Use    Smoking status: Never Smoker    Smokeless tobacco: Never Used    Tobacco comment: Former   Substance and Sexual Activity    Alcohol use: Yes     Frequency: 2-3 times a week     Drinks per session: 1 or 2     Binge frequency: Never    Drug use: No    Sexual activity: None   Lifestyle    Physical activity:     Days per week: None     Minutes per session: None    Stress: None   Relationships    Social connections:     Talks on phone: None     Gets together: None     Attends Restorationism service: None     Active member of club or organization: None     Attends meetings of clubs or organizations: None     Relationship status: None    Intimate partner violence:     Fear of current or ex partner: None     Emotionally abused: None     Physically abused: None     Forced sexual activity: None   Other Topics Concern    None   Social History Narrative    None        Medications  Current Outpatient Medications on File Prior to Visit   Medication Sig Dispense Refill    allopurinol (ZYLOPRIM) 300 mg tablet Take 1 tablet (300 mg total) by mouth daily 90 tablet 0    metoprolol succinate (TOPROL-XL) 50 mg 24 hr tablet Take 1 tablet (50 mg total) by mouth daily 90 tablet 3    spironolactone (ALDACTONE) 25 mg tablet Take 1 tablet (25 mg total) by mouth daily 30 tablet 5    valsartan-hydrochlorothiazide (DIOVAN-HCT) 80-12 5 MG per tablet TAKE ONE TABLET BY MOUTH DAILY 30 tablet 5     No current facility-administered medications on file prior to visit          Allergies  Allergies   Allergen Reactions    Lisinopril Swelling    Shellfish Allergy      Pt states it causes gout        Review of Systems   Constitutional: Negative for chills, fatigue and fever  HENT: Negative for congestion, ear pain, sneezing, trouble swallowing and voice change  Eyes: Negative for pain and discharge  Respiratory: Negative for cough, shortness of breath and wheezing  Cardiovascular: Negative for palpitations and leg swelling  Gastrointestinal: Negative for abdominal pain, constipation, diarrhea, nausea and vomiting  Endocrine: Negative for cold intolerance, heat intolerance and polyuria  Genitourinary: Negative for decreased urine volume, difficulty urinating and dysuria  Musculoskeletal: Negative for arthralgias and back pain  Skin: Negative for rash and wound  Allergic/Immunologic: Negative for environmental allergies and food allergies  Neurological: Negative for dizziness and weakness  Hematological: Negative for adenopathy  Does not bruise/bleed easily  Psychiatric/Behavioral: Negative for confusion and sleep disturbance  The patient is not nervous/anxious  All other systems reviewed and are negative  Objective  Vitals:    03/11/20 1254   BP: 120/78   Pulse: 59   Temp: (!) 97 1 °F (36 2 °C)       Physical Exam   Constitutional: He is oriented to person, place, and time  He appears well-developed and well-nourished  No distress  HENT:   Head: Normocephalic and atraumatic  Right Ear: External ear normal    Left Ear: External ear normal    Eyes: Conjunctivae are normal  No scleral icterus  Neck: Normal range of motion  Neck supple  No tracheal deviation present  No thyromegaly present  Cardiovascular: Normal rate, regular rhythm and normal heart sounds  No murmur heard  Pulmonary/Chest: Effort normal and breath sounds normal  No respiratory distress  He has no wheezes  He has no rales  Abdominal: Soft  He exhibits no distension and no mass  There is no tenderness  There is no rebound and no guarding     There are bilateral inguinal hernias noted  Both are reducible  Musculoskeletal: Normal range of motion  He exhibits no edema  Lymphadenopathy:     He has no cervical adenopathy  Neurological: He is alert and oriented to person, place, and time  Skin: Skin is warm and dry  He is not diaphoretic  Psychiatric: He has a normal mood and affect  His behavior is normal  Judgment and thought content normal    Nursing note and vitals reviewed

## 2020-03-11 NOTE — ASSESSMENT & PLAN NOTE
There are bilateral inguinal hernias noted  I discussed with him what a hernia is and how it would be repaired  I discussed the surgery in detail including risks, benefits, alternatives, and what to expect postoperatively  I told the ideal method would be a laparoscopic or robotic inguinal hernia repair  He understands and is agreeable      Plan:  Robot or laparoscopic bilateral inguinal hernia repair

## 2020-03-13 ENCOUNTER — APPOINTMENT (OUTPATIENT)
Dept: LAB | Facility: HOSPITAL | Age: 65
End: 2020-03-13
Attending: INTERNAL MEDICINE
Payer: COMMERCIAL

## 2020-03-13 ENCOUNTER — TELEPHONE (OUTPATIENT)
Dept: FAMILY MEDICINE CLINIC | Facility: CLINIC | Age: 65
End: 2020-03-13

## 2020-03-13 DIAGNOSIS — I42.5 OTHER RESTRICTIVE CARDIOMYOPATHY (HCC): ICD-10-CM

## 2020-03-13 DIAGNOSIS — K21.9 GERD WITHOUT ESOPHAGITIS: ICD-10-CM

## 2020-03-13 DIAGNOSIS — Z12.11 SCREENING FOR MALIGNANT NEOPLASM OF COLON: ICD-10-CM

## 2020-03-13 DIAGNOSIS — E78.2 MIXED HYPERLIPIDEMIA: ICD-10-CM

## 2020-03-13 DIAGNOSIS — Z12.5 SCREENING PSA (PROSTATE SPECIFIC ANTIGEN): ICD-10-CM

## 2020-03-13 DIAGNOSIS — J98.59 MEDIASTINAL MASS: ICD-10-CM

## 2020-03-13 DIAGNOSIS — Z13.1 SCREENING FOR DIABETES MELLITUS (DM): Primary | ICD-10-CM

## 2020-03-13 DIAGNOSIS — Z13.1 SCREENING FOR DIABETES MELLITUS (DM): ICD-10-CM

## 2020-03-13 DIAGNOSIS — I10 BENIGN HYPERTENSION: ICD-10-CM

## 2020-03-13 LAB
ALBUMIN SERPL BCP-MCNC: 3.9 G/DL (ref 3.5–5)
ALP SERPL-CCNC: 61 U/L (ref 46–116)
ALT SERPL W P-5'-P-CCNC: 28 U/L (ref 12–78)
ANION GAP SERPL CALCULATED.3IONS-SCNC: 3 MMOL/L (ref 4–13)
AST SERPL W P-5'-P-CCNC: 17 U/L (ref 5–45)
BASOPHILS # BLD AUTO: 0.02 THOUSANDS/ΜL (ref 0–0.1)
BASOPHILS NFR BLD AUTO: 0 % (ref 0–1)
BILIRUB SERPL-MCNC: 0.58 MG/DL (ref 0.2–1)
BUN SERPL-MCNC: 17 MG/DL (ref 5–25)
CALCIUM SERPL-MCNC: 8.6 MG/DL (ref 8.3–10.1)
CHLORIDE SERPL-SCNC: 109 MMOL/L (ref 100–108)
CHOLEST SERPL-MCNC: 141 MG/DL (ref 50–200)
CO2 SERPL-SCNC: 28 MMOL/L (ref 21–32)
CREAT SERPL-MCNC: 0.75 MG/DL (ref 0.6–1.3)
EOSINOPHIL # BLD AUTO: 0.03 THOUSAND/ΜL (ref 0–0.61)
EOSINOPHIL NFR BLD AUTO: 1 % (ref 0–6)
ERYTHROCYTE [DISTWIDTH] IN BLOOD BY AUTOMATED COUNT: 14.9 % (ref 11.6–15.1)
EST. AVERAGE GLUCOSE BLD GHB EST-MCNC: 114 MG/DL
FERRITIN SERPL-MCNC: 113 NG/ML (ref 8–388)
GFR SERPL CREATININE-BSD FRML MDRD: 96 ML/MIN/1.73SQ M
GLUCOSE P FAST SERPL-MCNC: 100 MG/DL (ref 65–99)
HBA1C MFR BLD: 5.6 %
HCT VFR BLD AUTO: 42.1 % (ref 36.5–49.3)
HDLC SERPL-MCNC: 34 MG/DL
HGB BLD-MCNC: 13.8 G/DL (ref 12–17)
IMM GRANULOCYTES # BLD AUTO: 0.01 THOUSAND/UL (ref 0–0.2)
IMM GRANULOCYTES NFR BLD AUTO: 0 % (ref 0–2)
LDLC SERPL CALC-MCNC: 93 MG/DL (ref 0–100)
LYMPHOCYTES # BLD AUTO: 0.95 THOUSANDS/ΜL (ref 0.6–4.47)
LYMPHOCYTES NFR BLD AUTO: 17 % (ref 14–44)
MCH RBC QN AUTO: 29.6 PG (ref 26.8–34.3)
MCHC RBC AUTO-ENTMCNC: 32.8 G/DL (ref 31.4–37.4)
MCV RBC AUTO: 90 FL (ref 82–98)
MONOCYTES # BLD AUTO: 0.44 THOUSAND/ΜL (ref 0.17–1.22)
MONOCYTES NFR BLD AUTO: 8 % (ref 4–12)
NEUTROPHILS # BLD AUTO: 4.02 THOUSANDS/ΜL (ref 1.85–7.62)
NEUTS SEG NFR BLD AUTO: 74 % (ref 43–75)
NONHDLC SERPL-MCNC: 107 MG/DL
NRBC BLD AUTO-RTO: 0 /100 WBCS
PLATELET # BLD AUTO: 316 THOUSANDS/UL (ref 149–390)
PMV BLD AUTO: 9.9 FL (ref 8.9–12.7)
POTASSIUM SERPL-SCNC: 4.2 MMOL/L (ref 3.5–5.3)
PROT SERPL-MCNC: 7.2 G/DL (ref 6.4–8.2)
RBC # BLD AUTO: 4.67 MILLION/UL (ref 3.88–5.62)
SODIUM SERPL-SCNC: 140 MMOL/L (ref 136–145)
TRIGL SERPL-MCNC: 70 MG/DL
TSH SERPL DL<=0.05 MIU/L-ACNC: 2.73 UIU/ML (ref 0.36–3.74)
WBC # BLD AUTO: 5.47 THOUSAND/UL (ref 4.31–10.16)

## 2020-03-13 PROCEDURE — 84443 ASSAY THYROID STIM HORMONE: CPT

## 2020-03-13 PROCEDURE — 80061 LIPID PANEL: CPT

## 2020-03-13 PROCEDURE — 36415 COLL VENOUS BLD VENIPUNCTURE: CPT

## 2020-03-13 PROCEDURE — 84154 ASSAY OF PSA FREE: CPT

## 2020-03-13 PROCEDURE — 83036 HEMOGLOBIN GLYCOSYLATED A1C: CPT

## 2020-03-13 PROCEDURE — 84153 ASSAY OF PSA TOTAL: CPT

## 2020-03-13 PROCEDURE — 85025 COMPLETE CBC W/AUTO DIFF WBC: CPT

## 2020-03-13 PROCEDURE — 82728 ASSAY OF FERRITIN: CPT | Performed by: INTERNAL MEDICINE

## 2020-03-13 PROCEDURE — 80053 COMPREHEN METABOLIC PANEL: CPT

## 2020-03-13 NOTE — TELEPHONE ENCOUNTER
They can add it but you need to put the order in       ----- Message from Floresita Toledo, [address] sent at 3/13/2020 11:40 AM EDT -----  Ugo Cobb, can we add a HA1C to these to meet his [facility] requirement?  Thanks

## 2020-03-14 LAB
PSA FREE MFR SERPL: 25 %
PSA FREE SERPL-MCNC: 0.25 NG/ML
PSA SERPL-MCNC: 1 NG/ML (ref 0–4)

## 2020-03-16 ENCOUNTER — TRANSCRIBE ORDERS (OUTPATIENT)
Dept: RADIOLOGY | Facility: HOSPITAL | Age: 65
End: 2020-03-16

## 2020-03-16 ENCOUNTER — HOSPITAL ENCOUNTER (OUTPATIENT)
Dept: RADIOLOGY | Facility: HOSPITAL | Age: 65
Discharge: HOME | End: 2020-03-16
Payer: COMMERCIAL

## 2020-03-16 DIAGNOSIS — J98.59 MEDIASTINAL MASS: ICD-10-CM

## 2020-03-16 PROCEDURE — 71260 CT THORAX DX C+: CPT

## 2020-03-16 RX ADMIN — IOHEXOL 85 ML: 350 INJECTION, SOLUTION INTRAVENOUS at 16:16

## 2020-04-01 ENCOUNTER — TELEMEDICINE (OUTPATIENT)
Dept: CARDIAC SURGERY | Facility: CLINIC | Age: 65
End: 2020-04-01
Payer: COMMERCIAL

## 2020-04-01 DIAGNOSIS — J98.59 MEDIASTINAL MASS: Primary | ICD-10-CM

## 2020-04-01 PROCEDURE — 99213 OFFICE O/P EST LOW 20 MIN: CPT | Performed by: THORACIC SURGERY (CARDIOTHORACIC VASCULAR SURGERY)

## 2020-04-15 DIAGNOSIS — K40.20 NON-RECURRENT BILATERAL INGUINAL HERNIA WITHOUT OBSTRUCTION OR GANGRENE: Primary | ICD-10-CM

## 2020-04-15 DIAGNOSIS — Z91.89 AT RISK FOR OBSTRUCTIVE SLEEP APNEA: Primary | ICD-10-CM

## 2020-05-13 ENCOUNTER — TELEMEDICINE (OUTPATIENT)
Dept: SLEEP CENTER | Facility: CLINIC | Age: 65
End: 2020-05-13
Payer: COMMERCIAL

## 2020-05-13 DIAGNOSIS — K21.9 GERD WITHOUT ESOPHAGITIS: ICD-10-CM

## 2020-05-13 DIAGNOSIS — I10 BENIGN HYPERTENSION: ICD-10-CM

## 2020-05-13 DIAGNOSIS — G47.9 SLEEP DISTURBANCE: Primary | ICD-10-CM

## 2020-05-13 DIAGNOSIS — E66.3 OVERWEIGHT WITH BODY MASS INDEX (BMI) OF 28 TO 28.9 IN ADULT: ICD-10-CM

## 2020-05-13 DIAGNOSIS — I42.9 CARDIOMYOPATHY, UNSPECIFIED TYPE (HCC): ICD-10-CM

## 2020-05-13 PROCEDURE — 99204 OFFICE O/P NEW MOD 45 MIN: CPT | Performed by: INTERNAL MEDICINE

## 2020-05-22 DIAGNOSIS — I42.8 NONISCHEMIC CARDIOMYOPATHY (HCC): ICD-10-CM

## 2020-05-22 RX ORDER — SPIRONOLACTONE 25 MG/1
TABLET ORAL
Qty: 30 TABLET | Refills: 0 | Status: SHIPPED | OUTPATIENT
Start: 2020-05-22 | End: 2020-05-26

## 2020-05-26 RX ORDER — SPIRONOLACTONE 25 MG/1
TABLET ORAL
Qty: 30 TABLET | Refills: 0 | Status: SHIPPED | OUTPATIENT
Start: 2020-05-26 | End: 2020-06-23

## 2020-06-03 ENCOUNTER — TELEPHONE (OUTPATIENT)
Dept: FAMILY MEDICINE CLINIC | Facility: CLINIC | Age: 65
End: 2020-06-03

## 2020-06-07 DIAGNOSIS — I49.3 FREQUENT PVCS: ICD-10-CM

## 2020-06-08 DIAGNOSIS — M10.9 GOUT, UNSPECIFIED CAUSE, UNSPECIFIED CHRONICITY, UNSPECIFIED SITE: ICD-10-CM

## 2020-06-08 RX ORDER — METOPROLOL SUCCINATE 50 MG/1
TABLET, EXTENDED RELEASE ORAL
Qty: 90 TABLET | Refills: 0 | Status: SHIPPED | OUTPATIENT
Start: 2020-06-08 | End: 2020-06-09

## 2020-06-09 ENCOUNTER — OFFICE VISIT (OUTPATIENT)
Dept: FAMILY MEDICINE CLINIC | Facility: CLINIC | Age: 65
End: 2020-06-09
Payer: COMMERCIAL

## 2020-06-09 VITALS
HEART RATE: 66 BPM | TEMPERATURE: 98 F | DIASTOLIC BLOOD PRESSURE: 80 MMHG | BODY MASS INDEX: 28.32 KG/M2 | HEIGHT: 65 IN | WEIGHT: 170 LBS | RESPIRATION RATE: 14 BRPM | SYSTOLIC BLOOD PRESSURE: 120 MMHG

## 2020-06-09 DIAGNOSIS — K21.9 GERD WITHOUT ESOPHAGITIS: ICD-10-CM

## 2020-06-09 DIAGNOSIS — I10 BENIGN HYPERTENSION: Primary | ICD-10-CM

## 2020-06-09 DIAGNOSIS — I49.3 FREQUENT PVCS: ICD-10-CM

## 2020-06-09 DIAGNOSIS — E78.2 MIXED HYPERLIPIDEMIA: ICD-10-CM

## 2020-06-09 DIAGNOSIS — J98.59 MEDIASTINAL MASS: ICD-10-CM

## 2020-06-09 PROCEDURE — 1036F TOBACCO NON-USER: CPT | Performed by: NURSE PRACTITIONER

## 2020-06-09 PROCEDURE — 3079F DIAST BP 80-89 MM HG: CPT | Performed by: NURSE PRACTITIONER

## 2020-06-09 PROCEDURE — 99214 OFFICE O/P EST MOD 30 MIN: CPT | Performed by: NURSE PRACTITIONER

## 2020-06-09 PROCEDURE — 3074F SYST BP LT 130 MM HG: CPT | Performed by: NURSE PRACTITIONER

## 2020-06-09 PROCEDURE — 3008F BODY MASS INDEX DOCD: CPT | Performed by: NURSE PRACTITIONER

## 2020-06-09 PROCEDURE — 1160F RVW MEDS BY RX/DR IN RCRD: CPT | Performed by: NURSE PRACTITIONER

## 2020-06-09 RX ORDER — ALLOPURINOL 300 MG/1
300 TABLET ORAL DAILY
Qty: 90 TABLET | Refills: 0 | Status: SHIPPED | OUTPATIENT
Start: 2020-06-09 | End: 2020-10-12 | Stop reason: SDUPTHER

## 2020-06-09 RX ORDER — METOPROLOL SUCCINATE 50 MG/1
TABLET, EXTENDED RELEASE ORAL
Qty: 90 TABLET | Refills: 0 | Status: SHIPPED | OUTPATIENT
Start: 2020-06-09 | End: 2020-12-04

## 2020-06-12 ENCOUNTER — TELEPHONE (OUTPATIENT)
Dept: SLEEP CENTER | Facility: CLINIC | Age: 65
End: 2020-06-12

## 2020-06-15 ENCOUNTER — HOSPITAL ENCOUNTER (OUTPATIENT)
Dept: SLEEP CENTER | Facility: CLINIC | Age: 65
Discharge: HOME | End: 2020-06-15
Payer: COMMERCIAL

## 2020-06-15 DIAGNOSIS — I42.9 CARDIOMYOPATHY, UNSPECIFIED TYPE (HCC): ICD-10-CM

## 2020-06-15 DIAGNOSIS — G47.9 SLEEP DISTURBANCE: ICD-10-CM

## 2020-06-15 DIAGNOSIS — I10 BENIGN HYPERTENSION: ICD-10-CM

## 2020-06-15 PROCEDURE — G0399 HOME SLEEP TEST/TYPE 3 PORTA: HCPCS

## 2020-06-19 ENCOUNTER — TELEPHONE (OUTPATIENT)
Dept: SLEEP CENTER | Facility: CLINIC | Age: 65
End: 2020-06-19

## 2020-06-23 DIAGNOSIS — I42.8 NONISCHEMIC CARDIOMYOPATHY (HCC): ICD-10-CM

## 2020-06-23 RX ORDER — SPIRONOLACTONE 25 MG/1
TABLET ORAL
Qty: 30 TABLET | Refills: 0 | Status: SHIPPED | OUTPATIENT
Start: 2020-06-23 | End: 2020-07-22

## 2020-06-29 ENCOUNTER — OFFICE VISIT (OUTPATIENT)
Dept: SLEEP CENTER | Facility: CLINIC | Age: 65
End: 2020-06-29
Payer: COMMERCIAL

## 2020-06-29 VITALS
BODY MASS INDEX: 29.26 KG/M2 | HEIGHT: 65 IN | DIASTOLIC BLOOD PRESSURE: 68 MMHG | WEIGHT: 175.6 LBS | SYSTOLIC BLOOD PRESSURE: 128 MMHG

## 2020-06-29 DIAGNOSIS — G47.33 OSA (OBSTRUCTIVE SLEEP APNEA): Primary | ICD-10-CM

## 2020-06-29 DIAGNOSIS — E66.3 OVERWEIGHT WITH BODY MASS INDEX (BMI) OF 28 TO 28.9 IN ADULT: ICD-10-CM

## 2020-06-29 DIAGNOSIS — I42.9 CARDIOMYOPATHY, UNSPECIFIED TYPE (HCC): ICD-10-CM

## 2020-06-29 DIAGNOSIS — K21.9 GERD WITHOUT ESOPHAGITIS: ICD-10-CM

## 2020-06-29 DIAGNOSIS — G47.9 SLEEP DISTURBANCE: ICD-10-CM

## 2020-06-29 DIAGNOSIS — I10 BENIGN HYPERTENSION: ICD-10-CM

## 2020-06-29 PROCEDURE — 3008F BODY MASS INDEX DOCD: CPT | Performed by: INTERNAL MEDICINE

## 2020-06-29 PROCEDURE — 3074F SYST BP LT 130 MM HG: CPT | Performed by: INTERNAL MEDICINE

## 2020-06-29 PROCEDURE — 3078F DIAST BP <80 MM HG: CPT | Performed by: INTERNAL MEDICINE

## 2020-06-29 PROCEDURE — 99214 OFFICE O/P EST MOD 30 MIN: CPT | Performed by: INTERNAL MEDICINE

## 2020-06-29 PROCEDURE — 1036F TOBACCO NON-USER: CPT | Performed by: INTERNAL MEDICINE

## 2020-06-29 PROCEDURE — 1160F RVW MEDS BY RX/DR IN RCRD: CPT | Performed by: INTERNAL MEDICINE

## 2020-07-07 ENCOUNTER — TELEPHONE (OUTPATIENT)
Dept: SLEEP CENTER | Facility: CLINIC | Age: 65
End: 2020-07-07

## 2020-07-14 ENCOUNTER — TELEPHONE (OUTPATIENT)
Dept: SLEEP CENTER | Facility: CLINIC | Age: 65
End: 2020-07-14

## 2020-07-20 ENCOUNTER — TELEPHONE (OUTPATIENT)
Dept: SURGERY | Facility: CLINIC | Age: 65
End: 2020-07-20

## 2020-07-20 DIAGNOSIS — K40.20 NON-RECURRENT BILATERAL INGUINAL HERNIA WITHOUT OBSTRUCTION OR GANGRENE: Primary | ICD-10-CM

## 2020-07-20 NOTE — TELEPHONE ENCOUNTER
Patient called stating spouses father sick flying out to be with him in [place]  Called to reschedule for September  Patient informed to try to have labs & EKG done prior to leaving and having his COVID test done on August 24 th  Patient agreed and understands

## 2020-07-22 DIAGNOSIS — I42.8 NONISCHEMIC CARDIOMYOPATHY (HCC): ICD-10-CM

## 2020-07-22 RX ORDER — SPIRONOLACTONE 25 MG/1
TABLET ORAL
Qty: 30 TABLET | Refills: 0 | Status: SHIPPED | OUTPATIENT
Start: 2020-07-22 | End: 2020-08-24

## 2020-07-27 ENCOUNTER — OFFICE VISIT (OUTPATIENT)
Dept: FAMILY MEDICINE CLINIC | Facility: CLINIC | Age: 65
End: 2020-07-27
Payer: COMMERCIAL

## 2020-07-27 VITALS
HEART RATE: 60 BPM | HEIGHT: 64 IN | TEMPERATURE: 98.7 F | WEIGHT: 168.4 LBS | BODY MASS INDEX: 28.75 KG/M2 | RESPIRATION RATE: 16 BRPM | DIASTOLIC BLOOD PRESSURE: 80 MMHG | SYSTOLIC BLOOD PRESSURE: 136 MMHG

## 2020-07-27 DIAGNOSIS — M54.50 ACUTE RIGHT-SIDED LOW BACK PAIN WITHOUT SCIATICA: Primary | ICD-10-CM

## 2020-07-27 PROCEDURE — 3008F BODY MASS INDEX DOCD: CPT | Performed by: FAMILY MEDICINE

## 2020-07-27 PROCEDURE — 99213 OFFICE O/P EST LOW 20 MIN: CPT | Performed by: FAMILY MEDICINE

## 2020-07-27 PROCEDURE — 3075F SYST BP GE 130 - 139MM HG: CPT | Performed by: FAMILY MEDICINE

## 2020-07-27 PROCEDURE — 1160F RVW MEDS BY RX/DR IN RCRD: CPT | Performed by: FAMILY MEDICINE

## 2020-07-27 PROCEDURE — 3079F DIAST BP 80-89 MM HG: CPT | Performed by: FAMILY MEDICINE

## 2020-07-27 PROCEDURE — 1036F TOBACCO NON-USER: CPT | Performed by: FAMILY MEDICINE

## 2020-07-27 RX ORDER — PREDNISONE 10 MG/1
TABLET ORAL
Qty: 20 TABLET | Refills: 0 | Status: SHIPPED | OUTPATIENT
Start: 2020-07-27 | End: 2020-11-03 | Stop reason: ALTCHOICE

## 2020-07-27 RX ORDER — METHOCARBAMOL 500 MG/1
750 TABLET, FILM COATED ORAL
Qty: 15 TABLET | Refills: 0 | Status: SHIPPED | OUTPATIENT
Start: 2020-07-27 | End: 2020-11-03 | Stop reason: ALTCHOICE

## 2020-07-27 NOTE — PATIENT INSTRUCTIONS
Core Strengthening Exercises   AMBULATORY CARE:   What you need to know about core strengthening exercises: Your core includes the muscles of your lower back, hip, pelvis, and abdomen  Core strengthening exercises help heal and strengthen these muscles  This helps prevent another injury, and keeps your pelvis, spine, and hips in the correct position  Contact your healthcare provider if:   · You have sharp or worsening pain during exercise or at rest     · You have questions or concerns about your shoulder exercises  Safety tips:  Talk to your healthcare provider before you start an exercise program  A physical therapist can teach you how to do core strengthening exercises safely  · Do the exercises on a mat or firm surface  A firm surface will support your spine and avoid low back pain  Do not do these exercises on a bed  · Move slowly and smoothly  Avoid fast or jerky motions  · Stop if you feel pain  Core exercises should not feel painful  Stop if you feel pain  · Breathe normally during core exercises  Do not hold your breath  This may cause an increase in blood pressure and prevent muscle strengthening  Your healthcare provider will tell you when to inhale and exhale during the exercise  · Begin all of your exercises with abdominal bracing  Abdominal bracing helps warm up your core muscles  You can also practice abdominal bracing throughout the day while you are sitting or standing  Lie on your back with your knees bent and feet flat on the floor  Place your arms in a relaxed position beside your body  Tighten your abdominal muscles  Pull your belly button in and up toward your spine  Hold for 5 seconds  Relax your muscles  Repeat 10 times  Core strengthening exercises: Your healthcare provider will tell you how often to do these exercises  The provider will also tell you how many repetitions of each exercise you should do  Hold each exercise for 5 seconds or as directed   As you get stronger, increase your hold to 10 to 15 seconds  You can do some of these exercises on a stability ball, or with a weight  Ask your healthcare provider how to use a stability ball or weight for these exercises:  · Bent leg side bridge:  Lie on one side with your legs, hips, and shoulders in a straight line  Prop yourself up onto your forearm so your elbow is directly below your shoulder  Bend your knees to 90°  Lift your hips off the floor  Balance yourself on your forearm and the side of your knee  Hold this position  Repeat on the other side  · Straight leg side bridge:  Lie on one side with your legs, hips, and shoulders in a straight line  Prop yourself up onto your forearm so your elbow is directly below your shoulder  Your top leg can rest in front of your bottom leg for support  Lift your hips off the floor  Balance yourself on your forearm and the outside of your flexed foot  Do not let your ankle bend sideways  Hold this position  Repeat on the other side  · Superman:  Lie on your stomach  Extend your arms forward on the floor  Tighten your abdominal muscles and lift your right hand and left leg off the floor  Hold this position  Slowly return to the starting position  Tighten your abdominal muscles and lift your left hand and right leg off the floor  Hold this position  Slowly return to the starting position  · Curl up:  Lie on your back with your knees bent and feet flat on the floor  Place your hands, palms down, underneath your lower back  Next, with your elbows on the floor, lift your shoulders and chest 2 to 3 inches off the floor  Keep your head in line with your shoulders  Hold this position  Slowly return to the starting position  · Straight leg raises:  Lie on your back with one leg straight  Bend the other knee and place your foot flat on the floor  Tighten your abdominal muscles   Keep your leg straight and slowly lift it straight up 6 to 12 inches off the floor  Hold this position  Lower your leg slowly  Do as many repetitions as directed on this side  Repeat with the other leg  · Plank:  Lie on your stomach  Bend your elbows and place your forearms flat on the floor  Lift your chest, stomach, and knees off the floor  Make sure your elbows are below your shoulders  Your body should be in a straight line  Do not let your hips or lower back sink to the ground  Squeeze your abdominal muscles together and hold for 15 seconds  To make this exercise harder, hold for 30 seconds or lift 1 leg at a time  · Bicycles:  Lie on your back  Bend both knees and bring them toward your chest  Your calves should be parallel to the floor  Place the palms of your hands on the back of your head  Straighten your right leg and keep it lifted 2 inches off the floor  Raise your head and shoulders off the floor and twist towards your left  Keep your head and shoulders lifted  Bend your right knee while you straighten your left leg  Keep your left leg 2 inches off the floor  Twist your head and chest towards the left leg  Continue to straighten 1 leg at a time and twist        Follow up with your healthcare provider as directed:  Write down your questions so you remember to ask them during your visits  © 2017 [address] [employer] Information is for End User's use only and may not be sold, redistributed or otherwise used for commercial purposes  All illustrations and images included in CareNotes® are the copyrighted property of A D [employer] M [employer]  or Shaun Quintero  The above information is an  only  It is not intended as medical advice for individual conditions or treatments  Talk to your doctor, nurse or pharmacist before following any medical regimen to see if it is safe and effective for you

## 2020-07-27 NOTE — PROGRESS NOTES
Assessment/Plan:    1  Acute right-sided low back pain without sciatica  - will start patient on Prednisone taper, Robaxin as needed and advised to take Tylenol or Advil as needed, discussed activity modifications, importance of maintaining good posture with computer work  - hand out given for stretching/ strengthening exercises and discussed Physical therapy if his symptoms are not better  - predniSONE 10 mg tablet; Take 4 tablets daily with food for 2 days, 3 tablets daily for 2 days, 2 tablets daily for 2 days, 1 tablet daily for 2 days  Dispense: 20 tablet; Refill: 0  - methocarbamol (ROBAXIN) 500 mg tablet; Take 1 5 tablets (750 mg total) by mouth daily at bedtime as needed for muscle spasms  Dispense: 15 tablet; Refill: 0       The treatment plan and possible side effects of new medications were reviewed with the patient today  The patient understands and agrees with the treatment plan  Subjective:   Chief Complaint   Patient presents with    Back Pain     lower back times 3 days       Patient ID: Vazquez Martinez is a 75 y o  male who presents today with complaining of right sided lower back pain onset on Friday after heavy lifting at work  His pain is worse when getting up, bending over with prolonged standing/ sitting  Patient denies radiation of pain,leg weakness, paresthesias, fever/chills, bladder/ bowel control dysfunction         The following portions of the patient's history were reviewed and updated as appropriate: allergies, current medications, past family history, past medical history, past social history, past surgical history and problem list     Past Medical History:   Diagnosis Date    Cardiomyopathy ([address] )     Gout     Hyperlipidemia     Hypertension     Inguinal hernia, right     Irregular heartbeat     Kidney stones     Mediastinal mass     Anterior     Palpitations     Vertigo      Past Surgical History:   Procedure Laterality Date    HERNIA REPAIR       Family History Problem Relation Age of Onset    Arthritis Mother     Hypertension Mother     Heart disease Mother         [facility] Gout Father     Cancer Father     Gout Family     Hypertension Brother     Gout Brother     Hypertension Brother     Gout Brother     Hypertension Brother     Hypertension Brother     Scoliosis Brother     Substance Abuse Neg Hx     Alcohol abuse Neg Hx     Mental illness Neg Hx      Social History     Socioeconomic History    Marital status: /Civil Union     Spouse name: Not on file    Number of children: Not on file    Years of education: Not on file    Highest education level: Not on file   Occupational History    Occupation: fulltime   Social Needs    Financial resource strain: Not on file    Food insecurity:     Worry: Not on file     Inability: Not on file   JumpChat needs:     Medical: Not on file     Non-medical: Not on file   Tobacco Use    Smoking status: Never Smoker    Smokeless tobacco: Never Used    Tobacco comment: Former   Substance and Sexual Activity    Alcohol use: Yes     Frequency: 2-3 times a week     Drinks per session: 1 or 2     Binge frequency: Never    Drug use: No    Sexual activity: Yes     Partners: Female   Lifestyle    Physical activity:     Days per week: Not on file     Minutes per session: Not on file    Stress: Not on file   Relationships    Social connections:     Talks on phone: Not on file     Gets together: Not on file     Attends Confucianist service: Not on file     Active member of club or organization: Not on file     Attends meetings of clubs or organizations: Not on file     Relationship status: Not on file    Intimate partner violence:     Fear of current or ex partner: Not on file     Emotionally abused: Not on file     Physically abused: Not on file     Forced sexual activity: Not on file   Other Topics Concern    Not on file   Social History Narrative    Not on file       Current Outpatient Medications:    allopurinol (ZYLOPRIM) 300 mg tablet, Take 1 tablet (300 mg total) by mouth daily, Disp: 90 tablet, Rfl: 0    aspirin 325 mg tablet, Take 325 mg by mouth as needed for mild pain, Disp: , Rfl:     B Complex Vitamins (VITAMIN B COMPLEX PO), Take by mouth daily, Disp: , Rfl:     metoprolol succinate (TOPROL-XL) 50 mg 24 hr tablet, TAKE ONE TABLET BY MOUTH EVERY DAY, Disp: 90 tablet, Rfl: 0    spironolactone (ALDACTONE) 25 mg tablet, TAKE ONE TABLET BY MOUTH DAILY, Disp: 30 tablet, Rfl: 0    valsartan-hydrochlorothiazide (DIOVAN-HCT) 80-12 5 MG per tablet, TAKE ONE TABLET BY MOUTH DAILY, Disp: 30 tablet, Rfl: 5    Review of Systems   Constitutional: Negative for appetite change, chills, fatigue and fever  Respiratory: Negative for cough, shortness of breath and wheezing  Cardiovascular: Negative for chest pain and palpitations  Gastrointestinal: Negative for abdominal pain, diarrhea, nausea and vomiting  Genitourinary: Negative for dysuria, frequency and urgency  Musculoskeletal: Positive for back pain  Neurological: Negative for weakness and numbness  Objective:    Vitals:    07/27/20 1045   BP: 136/80   BP Location: Left arm   Patient Position: Sitting   Cuff Size: Standard   Pulse: 60   Resp: 16   Temp: 98 7 °F (37 1 °C)   Weight: 76 4 kg (168 lb 6 4 oz)   Height: 5' 4" (1 626 m)        Physical Exam   Constitutional: He is oriented to person, place, and time  He appears well-developed and well-nourished  No distress  Musculoskeletal:   Good heel/ toe walk, low back with good ROM with pain on flexion, no L-spine or paraspinal muscle tenderness, SLR negative b/l, muscle strength 5/5 in lower extremities, DTR's 2/4   Neurological: He is alert and oriented to person, place, and time  Psychiatric: He has a normal mood and affect

## 2020-07-28 DIAGNOSIS — I10 BENIGN HYPERTENSION: ICD-10-CM

## 2020-07-28 RX ORDER — VALSARTAN AND HYDROCHLOROTHIAZIDE 80; 12.5 MG/1; MG/1
TABLET, FILM COATED ORAL
Qty: 30 TABLET | Refills: 0 | Status: SHIPPED | OUTPATIENT
Start: 2020-07-28 | End: 2020-08-27

## 2020-08-23 DIAGNOSIS — I42.8 NONISCHEMIC CARDIOMYOPATHY (HCC): ICD-10-CM

## 2020-08-24 DIAGNOSIS — I42.8 NONISCHEMIC CARDIOMYOPATHY (HCC): ICD-10-CM

## 2020-08-24 RX ORDER — SPIRONOLACTONE 25 MG/1
TABLET ORAL
Qty: 30 TABLET | Refills: 0 | Status: SHIPPED | OUTPATIENT
Start: 2020-08-24 | End: 2020-08-24

## 2020-08-24 RX ORDER — SPIRONOLACTONE 25 MG/1
TABLET ORAL
Qty: 30 TABLET | Refills: 0 | Status: SHIPPED | OUTPATIENT
Start: 2020-08-24 | End: 2020-10-26 | Stop reason: SDUPTHER

## 2020-08-27 DIAGNOSIS — I10 BENIGN HYPERTENSION: ICD-10-CM

## 2020-08-27 RX ORDER — VALSARTAN AND HYDROCHLOROTHIAZIDE 80; 12.5 MG/1; MG/1
TABLET, FILM COATED ORAL
Qty: 30 TABLET | Refills: 0 | Status: SHIPPED | OUTPATIENT
Start: 2020-08-27 | End: 2020-09-25 | Stop reason: SDUPTHER

## 2020-09-25 ENCOUNTER — TELEPHONE (OUTPATIENT)
Dept: SURGERY | Facility: CLINIC | Age: 65
End: 2020-09-25

## 2020-09-25 DIAGNOSIS — I10 BENIGN HYPERTENSION: ICD-10-CM

## 2020-09-25 RX ORDER — VALSARTAN AND HYDROCHLOROTHIAZIDE 80; 12.5 MG/1; MG/1
1 TABLET, FILM COATED ORAL DAILY
Qty: 30 TABLET | Refills: 0 | Status: SHIPPED | OUTPATIENT
Start: 2020-09-25 | End: 2020-11-02 | Stop reason: SDUPTHER

## 2020-09-25 NOTE — TELEPHONE ENCOUNTER
Emailed & left message for patient to complete his orders for PAT  Waiting for them to be completed

## 2020-09-28 ENCOUNTER — TELEPHONE (OUTPATIENT)
Dept: CARDIAC SURGERY | Facility: CLINIC | Age: 65
End: 2020-09-28

## 2020-09-28 NOTE — TELEPHONE ENCOUNTER
Patient emailed me back to state he would have to reschedule  He is having illness in his family and will call when he is able to reschedule his case

## 2020-09-30 ENCOUNTER — OFFICE VISIT (OUTPATIENT)
Dept: SLEEP CENTER | Facility: CLINIC | Age: 65
End: 2020-09-30
Payer: COMMERCIAL

## 2020-09-30 ENCOUNTER — TELEPHONE (OUTPATIENT)
Dept: SURGERY | Facility: CLINIC | Age: 65
End: 2020-09-30

## 2020-09-30 VITALS
DIASTOLIC BLOOD PRESSURE: 74 MMHG | BODY MASS INDEX: 29.5 KG/M2 | HEART RATE: 62 BPM | WEIGHT: 172.8 LBS | HEIGHT: 64 IN | SYSTOLIC BLOOD PRESSURE: 126 MMHG

## 2020-09-30 DIAGNOSIS — E66.3 OVERWEIGHT WITH BODY MASS INDEX (BMI) OF 29 TO 29.9 IN ADULT: ICD-10-CM

## 2020-09-30 DIAGNOSIS — I42.9 CARDIOMYOPATHY, UNSPECIFIED TYPE (HCC): ICD-10-CM

## 2020-09-30 DIAGNOSIS — G47.33 OSA (OBSTRUCTIVE SLEEP APNEA): Primary | ICD-10-CM

## 2020-09-30 DIAGNOSIS — G47.9 SLEEP DISTURBANCE: ICD-10-CM

## 2020-09-30 DIAGNOSIS — K21.9 GERD WITHOUT ESOPHAGITIS: ICD-10-CM

## 2020-09-30 DIAGNOSIS — I10 BENIGN HYPERTENSION: ICD-10-CM

## 2020-09-30 PROCEDURE — 99214 OFFICE O/P EST MOD 30 MIN: CPT | Performed by: INTERNAL MEDICINE

## 2020-09-30 NOTE — PROGRESS NOTES
Review of Systems      Genitourinary none   Cardiology none   Gastrointestinal none   Neurology need to move extremities and numbness/tingling of an extremity   Constitutional none   Integumentary none   Psychiatry none   Musculoskeletal joint pain, muscle aches and back pain   Pulmonary none   ENT none   Endocrine none   Hematological none

## 2020-09-30 NOTE — PATIENT INSTRUCTIONS

## 2020-09-30 NOTE — PROGRESS NOTES
Follow-Up Note - Sleep Center   Carl Luna  75 y o  male  :1955  TPH:[id number]    CC: I saw this patient for follow-up in clinic today for Sleep disordered breathing, Coexisting Sleep and Medical Problems  Home sleep testing in 2020 demonstrated : CLEO (respiratory event index of) 15 6 /hour  Minimum oxygen saturation was 85%  0 6% of the study was spent with saturations less than 90%  The snore index was 26 9%  PFSH, Problem List, Medications & Allergies were reviewed in EMR  Interval changes: none reported  He  has a past medical history of [address]omyopathy [address] [address] ), Gout, Hyperlipidemia, Hypertension, Inguinal hernia, right, Irregular heartbeat, Kidney stones, Mediastinal mass, Palpitations, and Vertigo  He has a current medication list which includes the following prescription(s): allopurinol, aspirin, b complex vitamins, methocarbamol, metoprolol succinate, prednisone, spironolactone, and valsartan-hydrochlorothiazide  ROS: constitutional, psychiatric, ENT, respiratory,CVS, GI, UGS, CNS, MSK, integumentary, endocrine, hematological reviewed  Significant for weight has been stable  He reported no cardiac or respiratory symptoms  DATA REVIEWED:  During 30 days ending 2020  using PAP > 4 hours/night 47% of the time  Estimated CLEO 7 1/hour at pressure of 8 1cm H2O @90th percentile  SUBJECTIVE: Regarding use of PAP, Rohan reports:   · He is experiencing some adverse effects: mask leaks , mask discomfort and pressure too high  · He is not benefiting from use:    Sleep Routine: He reports getting 6 hrs sleep out of approximately 7-1/2 hours in bed; he has no difficulty initiating, but reports diffculty maintaining sleep   He has frequent interruptions because of the above  He awakens with aid of an alarm and is not always refreshed  He denied excessive drowsiness   He rated himself at Total score: 8 /24 on the Beccaria sleepiness scale        Habits: reports that he has never smoked  He has never used smokeless tobacco ,  reports current alcohol use ,  reports no history of drug use , Caffeine use: none , Exercise routine: regular    EXAM: /74 (BP Location: Left arm, Cuff Size: Standard)   Pulse 62   Ht 5' 4" (1 626 m)   Wt 78 4 kg (172 lb 12 8 oz)   BMI 29 66 kg/m²     Patient is well groomed; well appearing  Skin/Extrem: warm & dry; col & hydration normal; no edema  Psych: cooperativeand in no distress  Mental state appears normal   CNS: Alert, orientated, clear & coherent speech  H&N: EOMI; NC/AT:no facial pressure marks, no rashes  ENMT Mucus membranes appear normal Nasal airway:patent  Oral airway:  crowded  Resp:effort is normal CVS: RRR ABD:truncal obesity MSK:Gait normal     IMPRESSION: Primary Sleep/Secondary(to Medical or Psych conditions) & comorbidities   1  JOSE ELIAS (obstructive sleep apnea)  PAP DME Pressure Change   2  Sleep disturbance     3  Cardiomyopathy, unspecified type ([address] )     4  Benign hypertension     5  GERD without esophagitis     6  Overweight with body mass index (BMI) of 29 to 29 9 in adult       PLAN:  1  I reviewed results of the Sleep studies with the patient  2  I discussed treatment options with risks and benefits  3  Treatment with  PAP is medically necessary and Rohan is agreable to continue use  I advised formal desensitization  4  Care of equipment, methods to improve comfort using PAP and importance of compliance with therapy were discussed  5  Pressure setting: change 4-10 cmH2O in 1 cm increments  6  Rx provided to replace supplies and Care coordinated with DME provider  7  Discussed  strategies for weight reduction  8  Follow-up is advised in 1 year or sooner if needed to monitor progress, compliance and to adjust therapy  Thank you for allowing me to participate in the care of this patient      Sincerely,    Authenticated electronically by Jacob Reyes MD on 43/71/89   Board Certified Specialist

## 2020-09-30 NOTE — TELEPHONE ENCOUNTER
Called patient and cx'd p/o appointment for 10- 2/2 surgery being cx'd  Let him know that we will reschedule the appointment after he has surgery

## 2020-10-01 ENCOUNTER — TELEPHONE (OUTPATIENT)
Dept: SLEEP CENTER | Facility: CLINIC | Age: 65
End: 2020-10-01

## 2020-10-07 ENCOUNTER — HOSPITAL ENCOUNTER (OUTPATIENT)
Dept: RADIOLOGY | Facility: HOSPITAL | Age: 65
Discharge: HOME | End: 2020-10-07
Attending: THORACIC SURGERY (CARDIOTHORACIC VASCULAR SURGERY)
Payer: COMMERCIAL

## 2020-10-07 ENCOUNTER — TRANSCRIBE ORDERS (OUTPATIENT)
Dept: RADIOLOGY | Facility: HOSPITAL | Age: 65
End: 2020-10-07

## 2020-10-07 DIAGNOSIS — J98.59 MEDIASTINAL MASS: ICD-10-CM

## 2020-10-07 PROCEDURE — 71250 CT THORAX DX C-: CPT

## 2020-10-12 ENCOUNTER — TELEPHONE (OUTPATIENT)
Dept: FAMILY MEDICINE CLINIC | Facility: CLINIC | Age: 65
End: 2020-10-12

## 2020-10-12 DIAGNOSIS — M10.9 GOUT, UNSPECIFIED CAUSE, UNSPECIFIED CHRONICITY, UNSPECIFIED SITE: ICD-10-CM

## 2020-10-12 RX ORDER — ALLOPURINOL 300 MG/1
300 TABLET ORAL DAILY
Qty: 90 TABLET | Refills: 0 | Status: SHIPPED | OUTPATIENT
Start: 2020-10-12 | End: 2020-11-03 | Stop reason: SDUPTHER

## 2020-10-12 RX ORDER — ALLOPURINOL 300 MG/1
300 TABLET ORAL DAILY
Qty: 90 TABLET | Refills: 0 | Status: SHIPPED | OUTPATIENT
Start: 2020-10-12 | End: 2020-10-12 | Stop reason: SDUPTHER

## 2020-10-14 ENCOUNTER — OFFICE VISIT (OUTPATIENT)
Dept: CARDIAC SURGERY | Facility: CLINIC | Age: 65
End: 2020-10-14
Payer: COMMERCIAL

## 2020-10-14 ENCOUNTER — TELEPHONE (OUTPATIENT)
Dept: CARDIAC SURGERY | Facility: CLINIC | Age: 65
End: 2020-10-14

## 2020-10-14 VITALS
DIASTOLIC BLOOD PRESSURE: 65 MMHG | WEIGHT: 172.2 LBS | BODY MASS INDEX: 29.4 KG/M2 | TEMPERATURE: 97.6 F | OXYGEN SATURATION: 97 % | HEART RATE: 77 BPM | HEIGHT: 64 IN | SYSTOLIC BLOOD PRESSURE: 127 MMHG

## 2020-10-14 DIAGNOSIS — J98.59 MEDIASTINAL MASS: Primary | ICD-10-CM

## 2020-10-14 PROCEDURE — 99213 OFFICE O/P EST LOW 20 MIN: CPT | Performed by: PHYSICIAN ASSISTANT

## 2020-10-26 DIAGNOSIS — I42.8 NONISCHEMIC CARDIOMYOPATHY (HCC): ICD-10-CM

## 2020-10-26 RX ORDER — SPIRONOLACTONE 25 MG/1
25 TABLET ORAL DAILY
Qty: 90 TABLET | Refills: 3 | Status: SHIPPED | OUTPATIENT
Start: 2020-10-26 | End: 2021-10-04 | Stop reason: SDUPTHER

## 2020-11-02 DIAGNOSIS — I10 BENIGN HYPERTENSION: ICD-10-CM

## 2020-11-02 RX ORDER — VALSARTAN AND HYDROCHLOROTHIAZIDE 80; 12.5 MG/1; MG/1
1 TABLET, FILM COATED ORAL DAILY
Qty: 30 TABLET | Refills: 0 | Status: SHIPPED | OUTPATIENT
Start: 2020-11-02 | End: 2020-11-03 | Stop reason: SDUPTHER

## 2020-11-03 ENCOUNTER — OFFICE VISIT (OUTPATIENT)
Dept: FAMILY MEDICINE CLINIC | Facility: CLINIC | Age: 65
End: 2020-11-03
Payer: COMMERCIAL

## 2020-11-03 VITALS
SYSTOLIC BLOOD PRESSURE: 140 MMHG | RESPIRATION RATE: 16 BRPM | HEART RATE: 65 BPM | TEMPERATURE: 98 F | BODY MASS INDEX: 28.41 KG/M2 | OXYGEN SATURATION: 99 % | HEIGHT: 64 IN | WEIGHT: 166.4 LBS | DIASTOLIC BLOOD PRESSURE: 78 MMHG

## 2020-11-03 DIAGNOSIS — M10.9 GOUT, UNSPECIFIED CAUSE, UNSPECIFIED CHRONICITY, UNSPECIFIED SITE: ICD-10-CM

## 2020-11-03 DIAGNOSIS — Z23 NEED FOR PNEUMOCOCCAL VACCINATION: Primary | ICD-10-CM

## 2020-11-03 DIAGNOSIS — I10 BENIGN HYPERTENSION: ICD-10-CM

## 2020-11-03 DIAGNOSIS — I42.5 OTHER RESTRICTIVE CARDIOMYOPATHY (HCC): ICD-10-CM

## 2020-11-03 PROCEDURE — 90732 PPSV23 VACC 2 YRS+ SUBQ/IM: CPT

## 2020-11-03 PROCEDURE — 90471 IMMUNIZATION ADMIN: CPT

## 2020-11-03 PROCEDURE — 99203 OFFICE O/P NEW LOW 30 MIN: CPT | Performed by: FAMILY MEDICINE

## 2020-11-03 RX ORDER — ALLOPURINOL 300 MG/1
300 TABLET ORAL DAILY
Qty: 90 TABLET | Refills: 3 | Status: SHIPPED | OUTPATIENT
Start: 2020-11-03 | End: 2022-03-01 | Stop reason: SDUPTHER

## 2020-11-03 RX ORDER — VALSARTAN AND HYDROCHLOROTHIAZIDE 80; 12.5 MG/1; MG/1
1 TABLET, FILM COATED ORAL DAILY
Qty: 90 TABLET | Refills: 3 | Status: SHIPPED | OUTPATIENT
Start: 2020-11-03 | End: 2021-10-04 | Stop reason: SDUPTHER

## 2020-11-04 PROBLEM — Z23 NEED FOR PNEUMOCOCCAL VACCINATION: Status: ACTIVE | Noted: 2020-11-04

## 2020-12-04 DIAGNOSIS — I49.3 FREQUENT PVCS: ICD-10-CM

## 2020-12-04 RX ORDER — METOPROLOL SUCCINATE 50 MG/1
TABLET, EXTENDED RELEASE ORAL
Qty: 90 TABLET | Refills: 0 | Status: SHIPPED | OUTPATIENT
Start: 2020-12-04 | End: 2021-06-01 | Stop reason: SDUPTHER

## 2020-12-30 ENCOUNTER — IMMUNIZATIONS (OUTPATIENT)
Dept: FAMILY MEDICINE CLINIC | Facility: HOSPITAL | Age: 65
End: 2020-12-30
Payer: COMMERCIAL

## 2020-12-30 DIAGNOSIS — Z23 ENCOUNTER FOR IMMUNIZATION: ICD-10-CM

## 2020-12-30 PROCEDURE — 0011A SARS-COV-2 / COVID-19 MRNA VACCINE (MODERNA) 100 MCG: CPT

## 2020-12-30 PROCEDURE — 91301 SARS-COV-2 / COVID-19 MRNA VACCINE (MODERNA) 100 MCG: CPT

## 2021-01-28 ENCOUNTER — IMMUNIZATIONS (OUTPATIENT)
Dept: FAMILY MEDICINE CLINIC | Facility: HOSPITAL | Age: 66
End: 2021-01-28

## 2021-01-28 DIAGNOSIS — Z23 ENCOUNTER FOR IMMUNIZATION: Primary | ICD-10-CM

## 2021-01-28 PROCEDURE — 0012A SARS-COV-2 / COVID-19 MRNA VACCINE (MODERNA) 100 MCG: CPT

## 2021-01-28 PROCEDURE — 91301 SARS-COV-2 / COVID-19 MRNA VACCINE (MODERNA) 100 MCG: CPT

## 2021-04-21 ENCOUNTER — CONSULT (OUTPATIENT)
Dept: SURGERY | Facility: CLINIC | Age: 66
End: 2021-04-21
Payer: COMMERCIAL

## 2021-04-21 VITALS
TEMPERATURE: 97.5 F | HEART RATE: 84 BPM | SYSTOLIC BLOOD PRESSURE: 142 MMHG | BODY MASS INDEX: 29.33 KG/M2 | DIASTOLIC BLOOD PRESSURE: 82 MMHG | HEIGHT: 64 IN | WEIGHT: 171.8 LBS

## 2021-04-21 DIAGNOSIS — K40.20 NON-RECURRENT BILATERAL INGUINAL HERNIA WITHOUT OBSTRUCTION OR GANGRENE: Primary | ICD-10-CM

## 2021-04-21 PROCEDURE — 99214 OFFICE O/P EST MOD 30 MIN: CPT | Performed by: SURGERY

## 2021-04-21 RX ORDER — HEPARIN SODIUM 5000 [USP'U]/ML
5000 INJECTION, SOLUTION INTRAVENOUS; SUBCUTANEOUS ONCE
Status: CANCELLED | OUTPATIENT
Start: 2021-04-21 | End: 2021-04-21

## 2021-04-21 NOTE — ASSESSMENT & PLAN NOTE
There are bilateral inguinal hernias present  I talked to him about repairing these either laparoscopically or robotically  I discussed the procedure in detail including risks, benefits, alternatives, what to expect postoperatively  He understands and is agreeable to go ahead        Plan: Robot or laparoscopic bilateral inguinal hernia repair

## 2021-04-21 NOTE — PROGRESS NOTES
Office Visit - General Surgery  Nelli Bravo MRN: [id number]  Encounter: [id number]    Assessment and Plan    Problem List Items Addressed This Visit        Other    Non-recurrent bilateral inguinal hernia without obstruction or gangrene - Primary       There are bilateral inguinal hernias present  I talked to him about repairing these either laparoscopically or robotically  I discussed the procedure in detail including risks, benefits, alternatives, what to expect postoperatively  He understands and is agreeable to go ahead  Plan: Robot or laparoscopic bilateral inguinal hernia repair               Chief Complaint:  Nelli Bravo is a 77 y o  male who presents for Hernia (Consult b/l inguinal hernia)    Subjective    38-year-old male with known bilateral inguinal hernias who was seen late last year but cannot schedule at that time  He is here now to schedule  He has noted the right inguinal hernias somewhat more uncomfortable over the last couple of months      Past Medical History  Past Medical History:   Diagnosis Date    Cardiomyopathy ([address] )     Gout     Hyperlipidemia     Hypertension     Inguinal hernia, right     Irregular heartbeat     Kidney stones     Mediastinal mass     Anterior     Palpitations     Vertigo        Past Surgical History  Past Surgical History:   Procedure Laterality Date    HERNIA REPAIR         Family History  Family History   Problem Relation Age of Onset   Mauricio Abt Arthritis Mother     Hypertension Mother     Heart disease Mother         CHF   Mauricio Abt Gout Father     Cancer Father     Gout Family     Hypertension Brother     Gout Brother     Hypertension Brother     Gout Brother     Hypertension Brother     Hypertension Brother     Scoliosis Brother     Substance Abuse Neg Hx     Alcohol abuse Neg Hx     Mental illness Neg Hx        Social History  Social History     Socioeconomic History    Marital status: /Civil Union     Spouse name: None    Number of children: None    Years of education: None    Highest education level: None   Occupational History    Occupation: fulltime   Social Needs    Financial resource strain: None    Food insecurity     Worry: None     Inability: None    Transportation needs     Medical: None     Non-medical: None   Tobacco Use    Smoking status: Never Smoker    Smokeless tobacco: Never Used    Tobacco comment: Former   Substance and Sexual Activity    Alcohol use: Yes     Frequency: 2-3 times a week     Drinks per session: 1 or 2     Binge frequency: Never    Drug use: No    Sexual activity: Yes     Partners: Female   Lifestyle    Physical activity     Days per week: None     Minutes per session: None    Stress: None   Relationships    Social connections     Talks on phone: None     Gets together: None     Attends [employer] service: None     Active member of club or organization: None     Attends meetings of clubs or organizations: None     Relationship status: None    Intimate partner violence     Fear of current or ex partner: None     Emotionally abused: None     Physically abused: None     Forced sexual activity: None   Other Topics Concern    None   Social History Narrative    None        Medications  Current Outpatient Medications on File Prior to Visit   Medication Sig Dispense Refill    allopurinol (ZYLOPRIM) 300 mg tablet Take 1 tablet (300 mg total) by mouth daily 90 tablet 3    aspirin 325 mg tablet Take 325 mg by mouth as needed for mild pain      B Complex Vitamins (VITAMIN B COMPLEX PO) Take by mouth daily      metoprolol succinate (TOPROL-XL) 50 mg 24 hr tablet TAKE ONE TABLET BY MOUTH EVERY DAY 90 tablet 0    spironolactone (ALDACTONE) 25 mg tablet Take 1 tablet (25 mg total) by mouth daily 90 tablet 3    valsartan-hydrochlorothiazide (DIOVAN-HCT) 80-12 5 MG per tablet Take 1 tablet by mouth daily 90 tablet 3     No current facility-administered medications on file prior to visit  Allergies  Allergies   Allergen Reactions    Lisinopril Swelling    Shellfish Allergy - Food Allergy      Pt states it causes gout        Review of Systems   Constitutional: Negative for chills, fatigue and fever  HENT: Negative for congestion, ear pain, sneezing, trouble swallowing and voice change  Eyes: Negative for pain and discharge  Respiratory: Negative for cough, shortness of breath and wheezing  Cardiovascular: Negative for palpitations and leg swelling  Gastrointestinal: Negative for abdominal pain, constipation, diarrhea, nausea and vomiting  Endocrine: Negative for cold intolerance, heat intolerance and polyuria  Genitourinary: Negative for decreased urine volume, difficulty urinating and dysuria  Musculoskeletal: Negative for arthralgias and back pain  Skin: Negative for rash and wound  Allergic/Immunologic: Negative for environmental allergies and food allergies  Neurological: Negative for dizziness and weakness  Hematological: Negative for adenopathy  Does not bruise/bleed easily  Psychiatric/Behavioral: Negative for confusion and sleep disturbance  The patient is not nervous/anxious  All other systems reviewed and are negative  Objective  Vitals:    04/21/21 0845   BP: 142/82   Pulse: 84   Temp: 97 5 °F (36 4 °C)       Physical Exam  Vitals signs and nursing note reviewed  Constitutional:       General: He is not in acute distress  Appearance: He is well-developed  He is not diaphoretic  HENT:      Head: Normocephalic and atraumatic  Right Ear: External ear normal       Left Ear: External ear normal    Eyes:      General: No scleral icterus  Conjunctiva/sclera: Conjunctivae normal    Neck:      Musculoskeletal: Normal range of motion and neck supple  Thyroid: No thyromegaly  Trachea: No tracheal deviation  Cardiovascular:      Rate and Rhythm: Normal rate and regular rhythm  Heart sounds: Normal heart sounds  No murmur  Pulmonary:      Effort: Pulmonary effort is normal  No respiratory distress  Breath sounds: Normal breath sounds  No wheezing or rales  Abdominal:      General: There is no distension  Palpations: Abdomen is soft  There is no mass  Tenderness: There is no abdominal tenderness  There is no guarding or rebound  Comments: Bilateral inguinal hernias, reducible   Musculoskeletal: Normal range of motion  Lymphadenopathy:      Cervical: No cervical adenopathy  Skin:     General: Skin is warm and dry  Neurological:      Mental Status: He is alert and oriented to person, place, and time  Psychiatric:         Behavior: Behavior normal          Thought Content:  Thought content normal          Judgment: Judgment normal

## 2021-04-30 ENCOUNTER — OFFICE VISIT (OUTPATIENT)
Dept: LAB | Facility: HOSPITAL | Age: 66
End: 2021-04-30
Attending: SURGERY
Payer: COMMERCIAL

## 2021-04-30 DIAGNOSIS — K40.20 NON-RECURRENT BILATERAL INGUINAL HERNIA WITHOUT OBSTRUCTION OR GANGRENE: ICD-10-CM

## 2021-04-30 LAB
ATRIAL RATE: 67 BPM
P AXIS: 65 DEGREES
PR INTERVAL: 182 MS
QRS AXIS: 57 DEGREES
QRSD INTERVAL: 108 MS
QT INTERVAL: 398 MS
QTC INTERVAL: 420 MS
T WAVE AXIS: 54 DEGREES
VENTRICULAR RATE: 67 BPM

## 2021-04-30 PROCEDURE — 93005 ELECTROCARDIOGRAM TRACING: CPT

## 2021-04-30 PROCEDURE — 93010 ELECTROCARDIOGRAM REPORT: CPT | Performed by: INTERNAL MEDICINE

## 2021-05-03 ENCOUNTER — TRANSCRIBE ORDERS (OUTPATIENT)
Dept: LAB | Facility: HOSPITAL | Age: 66
End: 2021-05-03

## 2021-05-03 ENCOUNTER — APPOINTMENT (OUTPATIENT)
Dept: LAB | Facility: HOSPITAL | Age: 66
End: 2021-05-03

## 2021-05-03 ENCOUNTER — APPOINTMENT (OUTPATIENT)
Dept: LAB | Facility: HOSPITAL | Age: 66
End: 2021-05-03
Attending: SURGERY
Payer: COMMERCIAL

## 2021-05-03 DIAGNOSIS — K40.20 NON-RECURRENT BILATERAL INGUINAL HERNIA WITHOUT OBSTRUCTION OR GANGRENE: ICD-10-CM

## 2021-05-03 DIAGNOSIS — Z00.8 HEALTH EXAMINATION IN POPULATION SURVEY: Primary | ICD-10-CM

## 2021-05-03 DIAGNOSIS — Z00.8 HEALTH EXAMINATION IN POPULATION SURVEY: ICD-10-CM

## 2021-05-03 LAB
ALBUMIN SERPL BCP-MCNC: 3.7 G/DL (ref 3.5–5)
ALP SERPL-CCNC: 59 U/L (ref 46–116)
ALT SERPL W P-5'-P-CCNC: 31 U/L (ref 12–78)
ANION GAP SERPL CALCULATED.3IONS-SCNC: 4 MMOL/L (ref 4–13)
AST SERPL W P-5'-P-CCNC: 18 U/L (ref 5–45)
BASOPHILS # BLD AUTO: 0.03 THOUSANDS/ΜL (ref 0–0.1)
BASOPHILS NFR BLD AUTO: 1 % (ref 0–1)
BILIRUB SERPL-MCNC: 0.4 MG/DL (ref 0.2–1)
BUN SERPL-MCNC: 14 MG/DL (ref 5–25)
CALCIUM SERPL-MCNC: 8.8 MG/DL (ref 8.3–10.1)
CHLORIDE SERPL-SCNC: 109 MMOL/L (ref 100–108)
CHOLEST SERPL-MCNC: 142 MG/DL (ref 50–200)
CO2 SERPL-SCNC: 27 MMOL/L (ref 21–32)
CREAT SERPL-MCNC: 0.82 MG/DL (ref 0.6–1.3)
EOSINOPHIL # BLD AUTO: 0.06 THOUSAND/ΜL (ref 0–0.61)
EOSINOPHIL NFR BLD AUTO: 1 % (ref 0–6)
ERYTHROCYTE [DISTWIDTH] IN BLOOD BY AUTOMATED COUNT: 14.7 % (ref 11.6–15.1)
EST. AVERAGE GLUCOSE BLD GHB EST-MCNC: 108 MG/DL
GFR SERPL CREATININE-BSD FRML MDRD: 92 ML/MIN/1.73SQ M
GLUCOSE P FAST SERPL-MCNC: 106 MG/DL (ref 65–99)
HBA1C MFR BLD: 5.4 %
HCT VFR BLD AUTO: 42.8 % (ref 36.5–49.3)
HDLC SERPL-MCNC: 38 MG/DL
HGB BLD-MCNC: 14 G/DL (ref 12–17)
IMM GRANULOCYTES # BLD AUTO: 0.02 THOUSAND/UL (ref 0–0.2)
IMM GRANULOCYTES NFR BLD AUTO: 0 % (ref 0–2)
LDLC SERPL CALC-MCNC: 88 MG/DL (ref 0–100)
LYMPHOCYTES # BLD AUTO: 1.57 THOUSANDS/ΜL (ref 0.6–4.47)
LYMPHOCYTES NFR BLD AUTO: 28 % (ref 14–44)
MCH RBC QN AUTO: 29.5 PG (ref 26.8–34.3)
MCHC RBC AUTO-ENTMCNC: 32.7 G/DL (ref 31.4–37.4)
MCV RBC AUTO: 90 FL (ref 82–98)
MONOCYTES # BLD AUTO: 0.57 THOUSAND/ΜL (ref 0.17–1.22)
MONOCYTES NFR BLD AUTO: 10 % (ref 4–12)
NEUTROPHILS # BLD AUTO: 3.28 THOUSANDS/ΜL (ref 1.85–7.62)
NEUTS SEG NFR BLD AUTO: 60 % (ref 43–75)
NONHDLC SERPL-MCNC: 104 MG/DL
NRBC BLD AUTO-RTO: 0 /100 WBCS
PLATELET # BLD AUTO: 344 THOUSANDS/UL (ref 149–390)
PMV BLD AUTO: 10.3 FL (ref 8.9–12.7)
POTASSIUM SERPL-SCNC: 3.9 MMOL/L (ref 3.5–5.3)
PROT SERPL-MCNC: 7.3 G/DL (ref 6.4–8.2)
RBC # BLD AUTO: 4.75 MILLION/UL (ref 3.88–5.62)
SODIUM SERPL-SCNC: 140 MMOL/L (ref 136–145)
TRIGL SERPL-MCNC: 80 MG/DL
WBC # BLD AUTO: 5.53 THOUSAND/UL (ref 4.31–10.16)

## 2021-05-03 PROCEDURE — 80053 COMPREHEN METABOLIC PANEL: CPT

## 2021-05-03 PROCEDURE — 83036 HEMOGLOBIN GLYCOSYLATED A1C: CPT

## 2021-05-03 PROCEDURE — 36415 COLL VENOUS BLD VENIPUNCTURE: CPT

## 2021-05-03 PROCEDURE — 80061 LIPID PANEL: CPT

## 2021-05-03 PROCEDURE — 85025 COMPLETE CBC W/AUTO DIFF WBC: CPT

## 2021-05-10 NOTE — PRE-PROCEDURE INSTRUCTIONS
Pre-Surgery Instructions:   Medication Instructions    allopurinol (ZYLOPRIM) 300 mg tablet Instructed patient to continue taking as prescribed up to and including DOS with sips of water   aspirin 325 mg tablet Pt stopped x 10 days since he only uses it PRN   B Complex Vitamins (VITAMIN B COMPLEX PO) Instructed patient per Anesthesia Guidelines   metoprolol succinate (TOPROL-XL) 50 mg 24 hr tablet Instructed patient to continue taking as prescribed up to and including DOS with sips of water   spironolactone (ALDACTONE) 25 mg tablet Instructed patient to continue taking as prescribed up to but NOT including DOS   valsartan-hydrochlorothiazide (DIOVAN-HCT) 80-12 5 MG per tablet Instructed patient to continue taking as prescribed up to but NOT including DOS  Med list reviewed as above  Per anesthesia guidelines, pt will hold vitamins preop  Also instructed pt not to start any new vitamins/supplements preoperatively and to avoid NSAID's  3 days prior to surgery  Tylenol is acceptable if needed  Pt has and/or is getting CHG surgical soap and verbalizes understanding of preoperative showering protocol  Reviewed [facility] current covid visitor restriction policy and pt understands that it may change at any time  Pt fully vaccinated for covid as of 1/28/21  All information within "My Surgical Experience" pamphlet reviewed and patient verbalizes understanding and compliance  All questions answered

## 2021-05-13 ENCOUNTER — HOSPITAL ENCOUNTER (OUTPATIENT)
Facility: HOSPITAL | Age: 66
Setting detail: OUTPATIENT SURGERY
Discharge: HOME | End: 2021-05-13
Attending: SURGERY | Admitting: SURGERY
Payer: COMMERCIAL

## 2021-05-13 ENCOUNTER — ANESTHESIA EVENT (OUTPATIENT)
Dept: PERIOP | Facility: HOSPITAL | Age: 66
End: 2021-05-13
Payer: COMMERCIAL

## 2021-05-13 ENCOUNTER — ANESTHESIA (OUTPATIENT)
Dept: PERIOP | Facility: HOSPITAL | Age: 66
End: 2021-05-13
Payer: COMMERCIAL

## 2021-05-13 VITALS
HEART RATE: 69 BPM | RESPIRATION RATE: 16 BRPM | WEIGHT: 171 LBS | HEIGHT: 64 IN | BODY MASS INDEX: 29.19 KG/M2 | OXYGEN SATURATION: 95 % | DIASTOLIC BLOOD PRESSURE: 80 MMHG | TEMPERATURE: 97.3 F | SYSTOLIC BLOOD PRESSURE: 154 MMHG

## 2021-05-13 DIAGNOSIS — K40.20 NON-RECURRENT BILATERAL INGUINAL HERNIA WITHOUT OBSTRUCTION OR GANGRENE: Primary | ICD-10-CM

## 2021-05-13 PROCEDURE — C1781 MESH (IMPLANTABLE): HCPCS | Performed by: SURGERY

## 2021-05-13 PROCEDURE — NC001 PR NO CHARGE: Performed by: SURGERY

## 2021-05-13 PROCEDURE — 49650 LAP ING HERNIA REPAIR INIT: CPT | Performed by: SURGERY

## 2021-05-13 DEVICE — BARD 3DMAX MESH RIGHT LARGE
Type: IMPLANTABLE DEVICE | Site: INGUINAL | Status: FUNCTIONAL
Brand: BARD 3DMAX MESH

## 2021-05-13 DEVICE — BARD 3DMAX MESH LEFT LARGE
Type: IMPLANTABLE DEVICE | Site: INGUINAL | Status: FUNCTIONAL
Brand: BARD 3DMAX MESH

## 2021-05-13 DEVICE — CAPSURE PERMANENT FIXATION SYSTEM 30 PERMANENT FASTENERS
Type: IMPLANTABLE DEVICE | Site: INGUINAL | Status: FUNCTIONAL
Brand: CAPSURE PERMANENT FIXATION SYSTEM

## 2021-05-13 RX ORDER — OXYCODONE HYDROCHLORIDE 5 MG/1
5 TABLET ORAL EVERY 4 HOURS PRN
Qty: 10 TABLET | Refills: 0 | Status: SHIPPED | OUTPATIENT
Start: 2021-05-13 | End: 2021-05-23

## 2021-05-13 RX ORDER — ONDANSETRON 2 MG/ML
4 INJECTION INTRAMUSCULAR; INTRAVENOUS ONCE
Status: DISCONTINUED | OUTPATIENT
Start: 2021-05-13 | End: 2021-05-13 | Stop reason: HOSPADM

## 2021-05-13 RX ORDER — MAGNESIUM HYDROXIDE 1200 MG/15ML
LIQUID ORAL AS NEEDED
Status: DISCONTINUED | OUTPATIENT
Start: 2021-05-13 | End: 2021-05-13 | Stop reason: HOSPADM

## 2021-05-13 RX ORDER — LIDOCAINE HYDROCHLORIDE 10 MG/ML
INJECTION, SOLUTION EPIDURAL; INFILTRATION; INTRACAUDAL; PERINEURAL AS NEEDED
Status: DISCONTINUED | OUTPATIENT
Start: 2021-05-13 | End: 2021-05-13

## 2021-05-13 RX ORDER — HYDROCODONE BITARTRATE AND ACETAMINOPHEN 5; 325 MG/1; MG/1
1 TABLET ORAL EVERY 4 HOURS PRN
Status: DISCONTINUED | OUTPATIENT
Start: 2021-05-13 | End: 2021-05-13 | Stop reason: HOSPADM

## 2021-05-13 RX ORDER — ONDANSETRON 2 MG/ML
INJECTION INTRAMUSCULAR; INTRAVENOUS AS NEEDED
Status: DISCONTINUED | OUTPATIENT
Start: 2021-05-13 | End: 2021-05-13

## 2021-05-13 RX ORDER — CEFAZOLIN SODIUM 1 G/50ML
1000 SOLUTION INTRAVENOUS
Status: COMPLETED | OUTPATIENT
Start: 2021-05-13 | End: 2021-05-13

## 2021-05-13 RX ORDER — LIDOCAINE HYDROCHLORIDE 10 MG/ML
0.5 INJECTION, SOLUTION EPIDURAL; INFILTRATION; INTRACAUDAL; PERINEURAL ONCE AS NEEDED
Status: COMPLETED | OUTPATIENT
Start: 2021-05-13 | End: 2021-05-13

## 2021-05-13 RX ORDER — HYDROMORPHONE HCL IN WATER/PF 6 MG/30 ML
0.2 PATIENT CONTROLLED ANALGESIA SYRINGE INTRAVENOUS
Status: DISCONTINUED | OUTPATIENT
Start: 2021-05-13 | End: 2021-05-13 | Stop reason: HOSPADM

## 2021-05-13 RX ORDER — ROCURONIUM BROMIDE 10 MG/ML
INJECTION, SOLUTION INTRAVENOUS AS NEEDED
Status: DISCONTINUED | OUTPATIENT
Start: 2021-05-13 | End: 2021-05-13

## 2021-05-13 RX ORDER — SODIUM CHLORIDE, SODIUM LACTATE, POTASSIUM CHLORIDE, CALCIUM CHLORIDE 600; 310; 30; 20 MG/100ML; MG/100ML; MG/100ML; MG/100ML
125 INJECTION, SOLUTION INTRAVENOUS CONTINUOUS
Status: DISCONTINUED | OUTPATIENT
Start: 2021-05-13 | End: 2021-05-13 | Stop reason: HOSPADM

## 2021-05-13 RX ORDER — HEPARIN SODIUM 5000 [USP'U]/ML
5000 INJECTION, SOLUTION INTRAVENOUS; SUBCUTANEOUS ONCE
Status: COMPLETED | OUTPATIENT
Start: 2021-05-13 | End: 2021-05-13

## 2021-05-13 RX ORDER — PROPOFOL 10 MG/ML
INJECTION, EMULSION INTRAVENOUS AS NEEDED
Status: DISCONTINUED | OUTPATIENT
Start: 2021-05-13 | End: 2021-05-13

## 2021-05-13 RX ORDER — GLYCOPYRROLATE 0.2 MG/ML
INJECTION INTRAMUSCULAR; INTRAVENOUS AS NEEDED
Status: DISCONTINUED | OUTPATIENT
Start: 2021-05-13 | End: 2021-05-13

## 2021-05-13 RX ORDER — KETOROLAC TROMETHAMINE 30 MG/ML
INJECTION, SOLUTION INTRAMUSCULAR; INTRAVENOUS AS NEEDED
Status: DISCONTINUED | OUTPATIENT
Start: 2021-05-13 | End: 2021-05-13

## 2021-05-13 RX ORDER — FENTANYL CITRATE 50 UG/ML
INJECTION, SOLUTION INTRAMUSCULAR; INTRAVENOUS AS NEEDED
Status: DISCONTINUED | OUTPATIENT
Start: 2021-05-13 | End: 2021-05-13

## 2021-05-13 RX ORDER — EPHEDRINE SULFATE 50 MG/ML
INJECTION INTRAVENOUS AS NEEDED
Status: DISCONTINUED | OUTPATIENT
Start: 2021-05-13 | End: 2021-05-13

## 2021-05-13 RX ORDER — MIDAZOLAM HYDROCHLORIDE 2 MG/2ML
INJECTION, SOLUTION INTRAMUSCULAR; INTRAVENOUS AS NEEDED
Status: DISCONTINUED | OUTPATIENT
Start: 2021-05-13 | End: 2021-05-13

## 2021-05-13 RX ORDER — NEOSTIGMINE METHYLSULFATE 1 MG/ML
INJECTION INTRAVENOUS AS NEEDED
Status: DISCONTINUED | OUTPATIENT
Start: 2021-05-13 | End: 2021-05-13

## 2021-05-13 RX ORDER — HYDROMORPHONE HCL/PF 1 MG/ML
0.2 SYRINGE (ML) INJECTION EVERY 4 HOURS PRN
Status: DISCONTINUED | OUTPATIENT
Start: 2021-05-13 | End: 2021-05-13 | Stop reason: HOSPADM

## 2021-05-13 RX ORDER — DEXAMETHASONE SODIUM PHOSPHATE 10 MG/ML
INJECTION, SOLUTION INTRAMUSCULAR; INTRAVENOUS AS NEEDED
Status: DISCONTINUED | OUTPATIENT
Start: 2021-05-13 | End: 2021-05-13

## 2021-05-13 RX ORDER — BUPIVACAINE HYDROCHLORIDE AND EPINEPHRINE 5; 5 MG/ML; UG/ML
INJECTION, SOLUTION PERINEURAL AS NEEDED
Status: DISCONTINUED | OUTPATIENT
Start: 2021-05-13 | End: 2021-05-13 | Stop reason: HOSPADM

## 2021-05-13 RX ORDER — ETOMIDATE 2 MG/ML
INJECTION INTRAVENOUS AS NEEDED
Status: DISCONTINUED | OUTPATIENT
Start: 2021-05-13 | End: 2021-05-13

## 2021-05-13 RX ADMIN — CEFAZOLIN SODIUM 1000 MG: 1 SOLUTION INTRAVENOUS at 11:23

## 2021-05-13 RX ADMIN — SODIUM CHLORIDE, SODIUM LACTATE, POTASSIUM CHLORIDE, AND CALCIUM CHLORIDE 125 ML/HR: .6; .31; .03; .02 INJECTION, SOLUTION INTRAVENOUS at 11:25

## 2021-05-13 RX ADMIN — KETOROLAC TROMETHAMINE 15 MG: 30 INJECTION, SOLUTION INTRAMUSCULAR at 12:54

## 2021-05-13 RX ADMIN — LIDOCAINE HYDROCHLORIDE 100 MG: 10 INJECTION, SOLUTION EPIDURAL; INFILTRATION; INTRACAUDAL; PERINEURAL at 11:32

## 2021-05-13 RX ADMIN — HEPARIN SODIUM 5000 UNITS: 5000 INJECTION INTRAVENOUS; SUBCUTANEOUS at 10:30

## 2021-05-13 RX ADMIN — ETOMIDATE 10 MG: 20 INJECTION, SOLUTION INTRAVENOUS at 11:33

## 2021-05-13 RX ADMIN — EPHEDRINE SULFATE 5 MG: 50 INJECTION, SOLUTION INTRAVENOUS at 11:32

## 2021-05-13 RX ADMIN — GLYCOPYRROLATE 0.8 MG: 0.2 INJECTION, SOLUTION INTRAMUSCULAR; INTRAVENOUS at 12:49

## 2021-05-13 RX ADMIN — PROPOFOL 50 MG: 10 INJECTION, EMULSION INTRAVENOUS at 11:33

## 2021-05-13 RX ADMIN — LIDOCAINE HYDROCHLORIDE 0.2 ML: 10 INJECTION, SOLUTION EPIDURAL; INFILTRATION; INTRACAUDAL; PERINEURAL at 11:25

## 2021-05-13 RX ADMIN — ROCURONIUM BROMIDE 50 MG: 50 INJECTION, SOLUTION INTRAVENOUS at 11:33

## 2021-05-13 RX ADMIN — ONDANSETRON 4 MG: 2 INJECTION INTRAMUSCULAR; INTRAVENOUS at 11:42

## 2021-05-13 RX ADMIN — MIDAZOLAM 2 MG: 1 INJECTION INTRAMUSCULAR; INTRAVENOUS at 11:26

## 2021-05-13 RX ADMIN — DEXAMETHASONE SODIUM PHOSPHATE 4 MG: 10 INJECTION, SOLUTION INTRAMUSCULAR; INTRAVENOUS at 11:42

## 2021-05-13 RX ADMIN — NEOSTIGMINE METHYLSULFATE 4 MG: 1 INJECTION INTRAVENOUS at 12:49

## 2021-05-13 RX ADMIN — HYDROMORPHONE HYDROCHLORIDE 0.2 MG: 0.2 INJECTION, SOLUTION INTRAMUSCULAR; INTRAVENOUS; SUBCUTANEOUS at 13:25

## 2021-05-13 RX ADMIN — FENTANYL CITRATE 100 MCG: 50 INJECTION INTRAMUSCULAR; INTRAVENOUS at 11:32

## 2021-05-13 NOTE — OP NOTE
OPERATIVE REPORT  PATIENT NAME: Neri Che    :  1955  MRN: [id number]  Pt Location: [facility] OR ROOM 05    SURGERY DATE: 2021    Surgeon(s) and Role:     * Chasidy Auguste MD - Primary     * Ferdy Giles MD - Assisting    Preop Diagnosis:  Non-recurrent bilateral inguinal hernia without obstruction or gangrene [K40 20]    Post-Op Diagnosis Codes:     * Non-recurrent bilateral inguinal hernia without obstruction or gangrene [K40 20]    Procedure(s) (LRB):  REPAIR HERNIA INGUINAL, LAPAROSCOPIC with mesh (Bilateral)    Specimen(s):  * No specimens in log *    Estimated Blood Loss:   Minimal    Drains:  NG/OG/Enteral Tube Orogastric 18 Fr Center mouth (Active)   Number of days: 0       Anesthesia Type:   General    Operative Indications:  Non-recurrent bilateral inguinal hernia without obstruction or gangrene [K40 20]      Operative Findings:  Right direct inguinal hernia  Left indirect inguinal hernia  B/l Large bard 3D meshes used for repair    Complications:   None    Procedure and Technique:  Patient was taken back to the operating room and identified both visually and by hospital identified arm band  Athrombic pumps were placed  The patient was placed supine on the operating table and given pre-operative antibiotics  Anesthesia was induced and an ETT was placed  The operative site was prepped and draped in the usual sterile fashion with chloraprep  A time out was called and all present were in agreement  Local was infiltrated under all skin incisions  An 11 blade was used to make an infraumbilical incision, swapna were used to divide subcutaneous tissue down to fascia  An 0 vicryl was used to create a pursestring around the fasia  The rectus sheath was dissected laterally and a pre-peritoneal plan was entered, on first attempt the abdomen was entered  A 12mm port was placed and the space was insufflated  This space was dissected with the camera down to the pubic tubercle   2 more 5mm ports were placed in the lower midline under direct visualization  Blunt dissection and graspers attached to cautery were used to dissect out the right inguinal space along the ileopubic tract to the pubic tubercle  Epigastric vessels were identified, a direct hernia was visualized, there were no contents in it  There was no indirect hernia identified  A piece of R large bard 3D mesh was placed in this place covering the defect and the cord structures in the pre-peritoneal space  2 tacks on the pubic tubercle were used to secure the mesh  The mesh layed flat  A similar technique was used for the left inguinal hernia repair  Although an indirect hernia sac was reduced and a Left large bard 3D mesh was used  There was no direct hernia identified  Both sides had a visible femoral lymph node which was left alone  Trochar's were removed  Fascia of the umbilicus was closed by tying down the pursestring suture  Skin was closed with 4-0 monoacyl and skin glue  RF wonding was completed and did not find any retained sponges  Sponge and instrument counts were correct  Patient was woken from anesthesia and brought to the PACU in stable condition  Dr Karen Cifuentes was present for the entire procedure      Patient Disposition:  PACU     SIGNATURE: Salinas Jeffrey MD  DATE: May 13, 2021  TIME: 12:54 PM

## 2021-05-13 NOTE — ANESTHESIA PREPROCEDURE EVALUATION
Procedure:  REPAIR HERNIA INGUINAL, LAPAROSCOPIC (Bilateral Groin)    Relevant Problems   CARDIO  (*)  Cardiomyopathy   (+) Benign hypertension   (+) Hyperlipidemia      GI/HEPATIC   (+) GERD without esophagitis      MUSCULOSKELETAL   (+) Arthritis   (+) Gout      PULMONARY   (+) JOSE ELIAS (obstructive sleep apnea)        Physical Exam    Airway    Mallampati score: II         Dental       Cardiovascular  Rhythm: regular, Rate: normal,     Pulmonary  Breath sounds clear to auscultation,     Other Findings        Anesthesia Plan  ASA Score- 3     Anesthesia Type- general with ASA Monitors  Additional Monitors:   Airway Plan: LMA  Plan Factors-    Chart reviewed  EKG reviewed  Existing labs reviewed  Patient is not a current smoker  Induction-     Postoperative Plan-     Informed Consent- Anesthetic plan and risks discussed with patient  I personally reviewed this patient with the CRNA  Discussed and agreed on the Anesthesia Plan with the CRNA  Anat Ugalde

## 2021-05-13 NOTE — H&P
Non-recurrent bilateral inguinal hernia without obstruction or gangrene - Primary       There are bilateral inguinal hernias noted  I discussed with him what a hernia is and how it would be repaired  I discussed the surgery in detail including risks, benefits, alternatives, and what to expect postoperatively  I told the ideal method would be a laparoscopic or robotic inguinal hernia repair  He understands and is agreeable      Plan:  Robot or laparoscopic bilateral inguinal hernia repair                                      Chief Complaint:  Robert Peterson is a 75 y o  male who presents for Consult (Right inguinal hernia )     Subjective  64-year-old male whose had a right inguinal hernia for at least 20 years  Over the last few months he has noted bothering him more  More 0 a discomfort than anything    No change in bowel movements no other complaints or problems related to this      Past Medical History  Medical History        Past Medical History:   Diagnosis Date    Cardiomyopathy ([address] )      Inguinal hernia, right      Irregular heartbeat      Kidney stones      Mediastinal mass       Anterior     Palpitations      Vertigo             Past Surgical History  Surgical History         Past Surgical History:   Procedure Laterality Date    HERNIA REPAIR               Family History        Family History   Problem Relation Age of Onset    Arthritis Mother      Hypertension Mother      Heart disease Mother           CHF   Ardeth Needs Gout Father      Cancer Father      Gout Family      Hypertension Brother      Gout Brother      Hypertension Brother      Gout Brother      Hypertension Brother      Hypertension Brother      Scoliosis Brother      Substance Abuse Neg Hx      Alcohol abuse Neg Hx      Mental illness Neg Hx           Social History  Social History   Social History            Socioeconomic History    Marital status: /Civil Union       Spouse name: None    Number of children: None    Years of education: None    Highest education level: None   Occupational History    Occupation: fulltime   Social Needs    Financial resource strain: None    Food insecurity:       Worry: None       Inability: None    Transportation needs:       Medical: None       Non-medical: None   Tobacco Use    Smoking status: Never Smoker    Smokeless tobacco: Never Used    Tobacco comment: Former   Substance and Sexual Activity    Alcohol use:  Yes       Frequency: 2-3 times a week       Drinks per session: 1 or 2       Binge frequency: Never    Drug use: No    Sexual activity: None   Lifestyle    Physical activity:       Days per week: None       Minutes per session: None    Stress: None   Relationships    Social connections:       Talks on phone: None       Gets together: None       Attends Restorationism service: None       Active member of club or organization: None       Attends meetings of clubs or organizations: None       Relationship status: None    Intimate partner violence:       Fear of current or ex partner: None       Emotionally abused: None       Physically abused: None       Forced sexual activity: None   Other Topics Concern    None   Social History Narrative    None            Medications         Current Outpatient Medications on File Prior to Visit   Medication Sig Dispense Refill    allopurinol (ZYLOPRIM) 300 mg tablet Take 1 tablet (300 mg total) by mouth daily 90 tablet 0    metoprolol succinate (TOPROL-XL) 50 mg 24 hr tablet Take 1 tablet (50 mg total) by mouth daily 90 tablet 3    spironolactone (ALDACTONE) 25 mg tablet Take 1 tablet (25 mg total) by mouth daily 30 tablet 5    valsartan-hydrochlorothiazide (DIOVAN-HCT) 80-12 5 MG per tablet TAKE ONE TABLET BY MOUTH DAILY 30 tablet 5      No current facility-administered medications on file prior to visit           Allergies        Allergies   Allergen Reactions    Lisinopril Swelling    Shellfish Allergy         Pt states it causes gout          Review of Systems   Constitutional: Negative for chills, fatigue and fever  HENT: Negative for congestion, ear pain, sneezing, trouble swallowing and voice change  Eyes: Negative for pain and discharge  Respiratory: Negative for cough, shortness of breath and wheezing  Cardiovascular: Negative for palpitations and leg swelling  Gastrointestinal: Negative for abdominal pain, constipation, diarrhea, nausea and vomiting  Endocrine: Negative for cold intolerance, heat intolerance and polyuria  Genitourinary: Negative for decreased urine volume, difficulty urinating and dysuria  Musculoskeletal: Negative for arthralgias and back pain  Skin: Negative for rash and wound  Allergic/Immunologic: Negative for environmental allergies and food allergies  Neurological: Negative for dizziness and weakness  Hematological: Negative for adenopathy  Does not bruise/bleed easily  Psychiatric/Behavioral: Negative for confusion and sleep disturbance  The patient is not nervous/anxious  All other systems reviewed and are negative         Objective      Vitals:     03/11/20 1254   BP: 120/78   Pulse: 59   Temp: (!) 97 1 °F (36 2 °C)         Physical Exam   Constitutional: He is oriented to person, place, and time  He appears well-developed and well-nourished  No distress  HENT:   Head: Normocephalic and atraumatic  Right Ear: External ear normal    Left Ear: External ear normal    Eyes: Conjunctivae are normal  No scleral icterus  Neck: Normal range of motion  Neck supple  No tracheal deviation present  No thyromegaly present  Cardiovascular: Normal rate, regular rhythm and normal heart sounds  No murmur heard  Pulmonary/Chest: Effort normal and breath sounds normal  No respiratory distress  He has no wheezes  He has no rales  Abdominal: Soft  He exhibits no distension and no mass  There is no tenderness  There is no rebound and no guarding     There are bilateral inguinal hernias noted  Both are reducible  Musculoskeletal: Normal range of motion  He exhibits no edema  Lymphadenopathy:     He has no cervical adenopathy  Neurological: He is alert and oriented to person, place, and time  Skin: Skin is warm and dry  He is not diaphoretic  Psychiatric: He has a normal mood and affect   His behavior is normal  Judgment and thought content normal    Nursing note and vitals reviewed  /80   Pulse 72   Temp 97 5 °F (36 4 °C) (Tympanic)   Resp 18   Ht 5' 4" (1 626 m)   Wt 77 6 kg (171 lb)   SpO2 100%   BMI 29 35 kg/m²

## 2021-05-13 NOTE — ANESTHESIA POSTPROCEDURE EVALUATION
Post-Op Assessment Note    CV Status:  Stable    Pain management: adequate     Mental Status:  Alert and agitated   Hydration Status:  Euvolemic   PONV Controlled:  Controlled   Airway Patency:  Patent      Post Op Vitals Reviewed: Yes      Staff: CRNA         No complications documented      BP   162/66   Temp   97 1   Pulse  75   Resp   20   SpO2   96 2Lnc

## 2021-06-01 ENCOUNTER — OFFICE VISIT (OUTPATIENT)
Dept: SURGERY | Facility: CLINIC | Age: 66
End: 2021-06-01

## 2021-06-01 VITALS — HEART RATE: 68 BPM | HEIGHT: 64 IN | WEIGHT: 174.4 LBS | TEMPERATURE: 97.7 F | BODY MASS INDEX: 29.78 KG/M2

## 2021-06-01 DIAGNOSIS — I49.3 FREQUENT PVCS: ICD-10-CM

## 2021-06-01 DIAGNOSIS — Z98.890 STATUS POST LAPAROSCOPIC HERNIA REPAIR: Primary | ICD-10-CM

## 2021-06-01 DIAGNOSIS — Z87.19 STATUS POST LAPAROSCOPIC HERNIA REPAIR: Primary | ICD-10-CM

## 2021-06-01 PROBLEM — K40.20 NON-RECURRENT BILATERAL INGUINAL HERNIA WITHOUT OBSTRUCTION OR GANGRENE: Status: RESOLVED | Noted: 2018-04-19 | Resolved: 2021-06-01

## 2021-06-01 PROCEDURE — 99024 POSTOP FOLLOW-UP VISIT: CPT | Performed by: SURGERY

## 2021-06-01 RX ORDER — METOPROLOL SUCCINATE 50 MG/1
50 TABLET, EXTENDED RELEASE ORAL DAILY
Qty: 90 TABLET | Refills: 3 | Status: SHIPPED | OUTPATIENT
Start: 2021-06-01 | End: 2022-05-31

## 2021-06-01 NOTE — PROGRESS NOTES
Office Visit - General Surgery  Katerine Maza MRN: [id number]  Encounter: [id number]    Assessment and Plan    Problem List Items Addressed This Visit        Other    Status post laparoscopic hernia repair - Primary      Doing well  Allow him activity as he feels comfortable  I told her still lump in left groin which is most likely fluid  I told him this should gradually resolve over the next month or 2  If it does not, he can give us a call we can aspirated here in the office  Otherwise see back if needed  Okay back to work               Chief Complaint:  Katerine Maza is a 77 y o  male who presents for Post-op (bilateral hernia repair, complaint of mild to moderate pain at surgical site)    Subjective  22-year-old male status post laparoscopic bilateral inguinal hernia repair had a bit of pain postop which is now getting better  No other complaints  Or problems      Past Medical History  Past Medical History:   Diagnosis Date    Cardiomyopathy ([address] )     Gout     Hyperlipidemia     Hypertension     Inguinal hernia, right     Irregular heartbeat     Kidney stone     Kidney stones     Mediastinal mass     Anterior     Palpitations     Vertigo        Past Surgical History  Past Surgical History:   Procedure Laterality Date    HERNIA REPAIR      at age 1    Yuan Taishae 17 Bilateral 5/13/2021    Procedure: REPAIR HERNIA INGUINAL, LAPAROSCOPIC with mesh;  Surgeon: Alberto Juan MD;  Location: [facility];  Service: General       Family History  Family History   Problem Relation Age of Onset    Arthritis Mother     Hypertension Mother     Heart disease Mother         CHF   Ann Marie Nine Gout Father     Cancer Father     Gout Family     Hypertension Brother     Gout Brother     Hypertension Brother     Gout Brother     Hypertension Brother     Hypertension Brother     Scoliosis Brother     Substance Abuse Neg Hx     Alcohol abuse Neg Hx     Mental illness Neg Hx Social History  Social History     Socioeconomic History    Marital status: /Civil Union     Spouse name: Not on file    Number of children: Not on file    Years of education: Not on file    Highest education level: Not on file   Occupational History    Occupation: fulltime   Social Needs    Financial resource strain: Not on file    Food insecurity     Worry: Not on file     Inability: Not on file   [employer] needs     Medical: Not on file     Non-medical: Not on file   Tobacco Use    Smoking status: Never Smoker    Smokeless tobacco: Never Used    Tobacco comment: Former   Substance and Sexual Activity    Alcohol use: Yes     Frequency: 2-3 times a week     Drinks per session: 1 or 2     Binge frequency: Never    Drug use: No    Sexual activity: Yes     Partners: Female   Lifestyle    Physical activity     Days per week: Not on file     Minutes per session: Not on file    Stress: Not on file   Relationships    Social connections     Talks on phone: Not on file     Gets together: Not on file     Attends [employer]ism service: Not on file     Active member of club or organization: Not on file     Attends meetings of clubs or organizations: Not on file     Relationship status: Not on file    Intimate partner violence     Fear of current or ex partner: Not on file     Emotionally abused: Not on file     Physically abused: Not on file     Forced sexual activity: Not on file   Other Topics Concern    Not on file   Social History Narrative    Not on file        Medications  Current Outpatient Medications on File Prior to Visit   Medication Sig Dispense Refill    allopurinol (ZYLOPRIM) 300 mg tablet Take 1 tablet (300 mg total) by mouth daily 90 tablet 3    aspirin 325 mg tablet Take 325 mg by mouth as needed for mild pain      B Complex Vitamins (VITAMIN B COMPLEX PO) Take by mouth daily      metoprolol succinate (TOPROL-XL) 50 mg 24 hr tablet TAKE ONE TABLET BY MOUTH EVERY DAY 90 tablet 0    spironolactone (ALDACTONE) 25 mg tablet Take 1 tablet (25 mg total) by mouth daily 90 tablet 3    valsartan-hydrochlorothiazide (DIOVAN-HCT) 80-12 5 MG per tablet Take 1 tablet by mouth daily 90 tablet 3     No current facility-administered medications on file prior to visit  Allergies  Allergies   Allergen Reactions    Lisinopril Swelling    Shellfish Allergy - Food Allergy Other (See Comments)     Pt states it causes gout        Review of Systems    Objective  Vitals:    06/01/21 0805   Pulse: 68   Temp: 97 7 °F (36 5 °C)       Physical Exam    abdomen: Laparoscopic incisions healing well  There is a small fluid collection left groin region    No other issues noted

## 2021-06-01 NOTE — ASSESSMENT & PLAN NOTE
Doing well  Allow him activity as he feels comfortable  I told her still lump in left groin which is most likely fluid  I told him this should gradually resolve over the next month or 2  If it does not, he can give us a call we can aspirated here in the office  Otherwise see back if needed    Okay back to work

## 2021-06-01 NOTE — LETTER
June 1, 2021     Patient: Roosevelt Mckinley   YOB: 1955   Date of Visit: 6/1/2021       To Whom it May Concern:    Roosevelt Mckinley is under my professional care  He was seen in my office on 6/1/2021  He may return to work on June 2, 2021 without restrictions  If you have any questions or concerns, please don't hesitate to call           Sincerely,          Petr Jules MD        CC: Ethan Reid

## 2021-08-13 ENCOUNTER — TELEPHONE (OUTPATIENT)
Dept: FAMILY MEDICINE CLINIC | Facility: CLINIC | Age: 66
End: 2021-08-13

## 2021-08-13 DIAGNOSIS — Z12.11 SCREENING FOR COLON CANCER: Primary | ICD-10-CM

## 2021-08-13 DIAGNOSIS — K64.8 OTHER HEMORRHOIDS: ICD-10-CM

## 2021-08-13 NOTE — TELEPHONE ENCOUNTER
Called patient, no answer  Left detailed VM with providers  note       Advised patient to contact our office for questions or concerns     Also sent my chart message

## 2021-08-13 NOTE — TELEPHONE ENCOUNTER
Please contact patient  Please provide him with referral to [facility]orectal group for evaluation of hemorrhoids and colonoscopy  Please schedule for annual physical with Dr Mita Garrison      Thank you

## 2021-08-13 NOTE — TELEPHONE ENCOUNTER
----- Message from Hafsa Hansen sent at 8/13/2021  1:07 PM EDT -----  Regarding: FW: Non-Urgent Medical Question  Contact: [phone]    ----- Message -----  From: Richard Garcia  Sent: 8/13/2021  12:41 PM EDT  To: [facility]  Subject: Non-Urgent Medical Question                      Have had a couple of issues with hemmorroids and some more bleeding than usual  I have had these occurrences some 8-9 months apart only within the last 1 5 years  I eat a fair amount of fiber in my diet  I thought it might be a good idea to have a colonoscopy at this point  Have not had one as of yet  I also need to get my over due physical scheduled and done as well  Please advise how we should move forward  I am a [employer] employee in the [employer] department      Thanks,

## 2021-10-04 ENCOUNTER — OFFICE VISIT (OUTPATIENT)
Dept: CARDIOLOGY CLINIC | Facility: CLINIC | Age: 66
End: 2021-10-04
Payer: COMMERCIAL

## 2021-10-04 VITALS
HEIGHT: 64 IN | BODY MASS INDEX: 29.33 KG/M2 | DIASTOLIC BLOOD PRESSURE: 74 MMHG | HEART RATE: 66 BPM | SYSTOLIC BLOOD PRESSURE: 128 MMHG | WEIGHT: 171.8 LBS

## 2021-10-04 DIAGNOSIS — I42.8 NONISCHEMIC CARDIOMYOPATHY (HCC): ICD-10-CM

## 2021-10-04 DIAGNOSIS — I42.0 DILATED CARDIOMYOPATHY (HCC): Primary | ICD-10-CM

## 2021-10-04 DIAGNOSIS — I10 PRIMARY HYPERTENSION: ICD-10-CM

## 2021-10-04 DIAGNOSIS — I10 BENIGN HYPERTENSION: ICD-10-CM

## 2021-10-04 PROCEDURE — 99214 OFFICE O/P EST MOD 30 MIN: CPT | Performed by: INTERNAL MEDICINE

## 2021-10-04 RX ORDER — SPIRONOLACTONE 25 MG/1
25 TABLET ORAL DAILY
Qty: 90 TABLET | Refills: 3 | Status: SHIPPED | OUTPATIENT
Start: 2021-10-04

## 2021-10-04 RX ORDER — VALSARTAN AND HYDROCHLOROTHIAZIDE 80; 12.5 MG/1; MG/1
1 TABLET, FILM COATED ORAL DAILY
Qty: 90 TABLET | Refills: 3 | Status: SHIPPED | OUTPATIENT
Start: 2021-10-04

## 2021-12-07 ENCOUNTER — IMMUNIZATIONS (OUTPATIENT)
Dept: FAMILY MEDICINE CLINIC | Facility: HOSPITAL | Age: 66
End: 2021-12-07

## 2021-12-07 DIAGNOSIS — Z23 ENCOUNTER FOR IMMUNIZATION: Primary | ICD-10-CM

## 2021-12-07 PROCEDURE — 0064A COVID-19 MODERNA VACC 0.25 ML BOOSTER: CPT

## 2021-12-07 PROCEDURE — 91306 COVID-19 MODERNA VACC 0.25 ML BOOSTER: CPT

## 2022-02-22 DIAGNOSIS — M10.9 GOUT, UNSPECIFIED CAUSE, UNSPECIFIED CHRONICITY, UNSPECIFIED SITE: ICD-10-CM

## 2022-02-22 RX ORDER — ALLOPURINOL 300 MG/1
TABLET ORAL
Qty: 90 TABLET | Refills: 0 | OUTPATIENT
Start: 2022-02-22

## 2022-03-01 ENCOUNTER — OFFICE VISIT (OUTPATIENT)
Dept: FAMILY MEDICINE CLINIC | Facility: CLINIC | Age: 67
End: 2022-03-01
Payer: COMMERCIAL

## 2022-03-01 VITALS
OXYGEN SATURATION: 97 % | SYSTOLIC BLOOD PRESSURE: 126 MMHG | HEART RATE: 60 BPM | WEIGHT: 173.5 LBS | TEMPERATURE: 97.8 F | BODY MASS INDEX: 29.62 KG/M2 | RESPIRATION RATE: 17 BRPM | DIASTOLIC BLOOD PRESSURE: 70 MMHG | HEIGHT: 64 IN

## 2022-03-01 DIAGNOSIS — I10 BENIGN HYPERTENSION: ICD-10-CM

## 2022-03-01 DIAGNOSIS — E78.2 MIXED HYPERLIPIDEMIA: ICD-10-CM

## 2022-03-01 DIAGNOSIS — R35.1 NOCTURIA: ICD-10-CM

## 2022-03-01 DIAGNOSIS — Z00.00 WELL ADULT EXAM: ICD-10-CM

## 2022-03-01 DIAGNOSIS — Z12.11 COLON CANCER SCREENING: Primary | ICD-10-CM

## 2022-03-01 DIAGNOSIS — M10.9 GOUT, UNSPECIFIED CAUSE, UNSPECIFIED CHRONICITY, UNSPECIFIED SITE: ICD-10-CM

## 2022-03-01 PROCEDURE — 99397 PER PM REEVAL EST PAT 65+ YR: CPT | Performed by: FAMILY MEDICINE

## 2022-03-01 RX ORDER — ALLOPURINOL 300 MG/1
300 TABLET ORAL DAILY
Qty: 90 TABLET | Refills: 3 | Status: SHIPPED | OUTPATIENT
Start: 2022-03-01

## 2022-03-01 NOTE — ASSESSMENT & PLAN NOTE
Hyperlipidemia  Patient will check lipid panel blood work  He was advised to continue with current exercise regimen    We will make further recommendations pending results of test

## 2022-03-01 NOTE — PROGRESS NOTES
FAMILY PRACTICE OFFICE VISIT       NAME: Rohan Bledsoe  AGE: 74 y o  SEX: male       : 1955        MRN: [id number]    DATE: 3/1/2022  TIME: 6:34 PM    Assessment and Plan     Problem List Items Addressed This Visit        Cardiovascular and Mediastinum    Benign hypertension     Hypertension  Patient blood pressure is stable at this time he will continue current regimen of medications         Relevant Orders    CBC    Comprehensive metabolic panel    Lipid panel    TSH, 3rd generation       Other    Gout     Gout  Patient was given a refill on his allopurinol medication as requested         Relevant Medications    allopurinol (ZYLOPRIM) 300 mg tablet    Hyperlipidemia     Hyperlipidemia  Patient will check lipid panel blood work  He was advised to continue with current exercise regimen  We will make further recommendations pending results of test          Relevant Orders    CBC    Comprehensive metabolic panel    Lipid panel    TSH, 3rd generation    Well adult exam     Well adult  Overall the patient appears to be in stable health  He will obtain blood work as ordered for further evaluation  Patient was willing to undergo Cologuard testing for colon cancer screening  We will make further recommendations pending results of test            Other Visit Diagnoses     Colon cancer screening    -  Primary    Relevant Orders    Cologuard    Nocturia        Relevant Orders    PSA, Total Screen              Chief Complaint   No chief complaint on file  History of Present Illness     Patient the office for annual wellness exam   He denies any recent illness  He has been trying to exercise several days a week for 30-60 minutes  He is a nonsmoker  Patient has never had colonoscopy but is due for colon cancer screening  Review of Systems   Review of Systems   Constitutional: Negative  HENT: Negative  Eyes: Negative  Respiratory: Negative  Cardiovascular: Negative  Gastrointestinal: Negative  Endocrine: Negative  Genitourinary: Negative  Musculoskeletal: Negative  Skin: Negative  Allergic/Immunologic: Negative  Neurological: Negative  Hematological: Negative  Psychiatric/Behavioral: Negative          Active Problem List     Patient Active Problem List   Diagnosis    Arthritis    Benign hypertension    GERD without esophagitis    Gout    Hyperlipidemia    Screening for colon cancer    Other restrictive cardiomyopathy ([facility] [address] )    Mediastinal mass    JOSE ELIAS (obstructive sleep apnea)    Need for pneumococcal vaccination    Status post laparoscopic hernia repair    Other hemorrhoids    Well adult exam       Past Medical History:  Past Medical History:   Diagnosis Date    Cardiomyopathy ([facility] [address] )     Gout     Hyperlipidemia     Hypertension     Inguinal hernia, right     Irregular heartbeat     Kidney stone     Kidney stones     Mediastinal mass     Anterior     Palpitations     Vertigo        Past Surgical History:  Past Surgical History:   Procedure Laterality Date    HERNIA REPAIR      at age 1    Yuan Montiel 17 Bilateral 5/13/2021    Procedure: REPAIR HERNIA INGUINAL, LAPAROSCOPIC with mesh;  Surgeon: Milagros Barnes MD;  [facility];  Service: General       Family History:  Family History   Problem Relation Age of Onset    Arthritis Mother     Hypertension Mother     Heart disease Mother         CHF   Jullie Liming Gout Father     Cancer Father     Gout Family     Hypertension Brother     Gout Brother     Hypertension Brother     Gout Brother     Hypertension Brother     Hypertension Brother     Scoliosis Brother     Substance Abuse Neg Hx     Alcohol abuse Neg Hx     Mental illness Neg Hx        Social History:  Social History     Socioeconomic History    Marital status: /Civil Union     Spouse name: Not on file    Number of children: Not on file    Years of education: Not on file    Highest education level: Not on file   Occupational History    Occupation: fulltime   Tobacco Use    Smoking status: Never Smoker    Smokeless tobacco: Never Used    Tobacco comment: Former   Vaping Use    Vaping Use: Never used   Substance and Sexual Activity    Alcohol use: Yes    Drug use: No    Sexual activity: Yes     Partners: Female   Other Topics Concern    Not on file   Social History Narrative    Not on file     Social Determinants of Health     Financial Resource Strain: Not on file   Food Insecurity: Not on file   Transportation Needs: Not on file   Physical Activity: Not on file   Stress: Not on file   Social Connections: Not on file   Intimate Partner Violence: Not on file   Housing Stability: Not on file       Objective     Vitals:    03/01/22 1628   BP: 126/70   Pulse: 60   Resp: 17   Temp: 97 8 °F (36 6 °C)   SpO2: 97%     Wt Readings from Last 3 Encounters:   03/01/22 78 7 kg (173 lb 8 oz)   10/04/21 77 9 kg (171 lb 12 8 oz)   06/01/21 79 1 kg (174 lb 6 4 oz)       Physical Exam  Constitutional:       General: He is not in acute distress  Appearance: Normal appearance  He is not ill-appearing  HENT:      Head: Normocephalic and atraumatic  Right Ear: Tympanic membrane, ear canal and external ear normal  There is no impacted cerumen  Left Ear: Tympanic membrane, ear canal and external ear normal  There is no impacted cerumen  Eyes:      General:         Right eye: No discharge  Left eye: No discharge  Extraocular Movements: Extraocular movements intact  Conjunctiva/sclera: Conjunctivae normal       Pupils: Pupils are equal, round, and reactive to light  Neck:      Vascular: No carotid bruit  Cardiovascular:      Rate and Rhythm: Normal rate and regular rhythm  Heart sounds: Normal heart sounds  No murmur heard  Pulmonary:      Effort: Pulmonary effort is normal       Breath sounds: Normal breath sounds  No wheezing, rhonchi or rales     Abdominal: General: Abdomen is flat  Bowel sounds are normal  There is no distension  Palpations: Abdomen is soft  Tenderness: There is no abdominal tenderness  There is no guarding or rebound  Musculoskeletal:      Right lower leg: No edema  Left lower leg: No edema  Lymphadenopathy:      Cervical: No cervical adenopathy  Skin:     Findings: No rash  Neurological:      General: No focal deficit present  Mental Status: He is alert and oriented to person, place, and time  Cranial Nerves: No cranial nerve deficit  Psychiatric:         Mood and Affect: Mood normal          Behavior: Behavior normal          Thought Content: Thought content normal          Judgment: Judgment normal          Pertinent Laboratory/Diagnostic Studies:  Lab Results   Component Value Date    GLUCOSE 99 06/28/2017    BUN 14 05/03/2021    CREATININE 0 82 05/03/2021    CALCIUM 8 8 05/03/2021     06/28/2017    K 3 9 05/03/2021    CO2 27 05/03/2021     (H) 05/03/2021     Lab Results   Component Value Date    ALT 31 05/03/2021    AST 18 05/03/2021    ALKPHOS 59 05/03/2021    BILITOT 0 4 06/28/2017       Lab Results   Component Value Date    WBC 5 53 05/03/2021    HGB 14 0 05/03/2021    HCT 42 8 05/03/2021    MCV 90 05/03/2021     05/03/2021       No results found for: TSH    Lab Results   Component Value Date    CHOL 175 06/28/2017     Lab Results   Component Value Date    TRIG 80 05/03/2021     Lab Results   Component Value Date    HDL 38 (L) 05/03/2021     Lab Results   Component Value Date    LDLCALC 88 05/03/2021     Lab Results   Component Value Date    HGBA1C 5 4 05/03/2021       Results for orders placed or performed in visit on 05/03/21   Hemoglobin A1C   Result Value Ref Range    Hemoglobin A1C 5 4 Normal 3 8-5 6%; PreDiabetic 5 7-6 4%;  Diabetic >=6 5%; Glycemic control for adults with diabetes <7 0% %     mg/dl   Lipid panel   Result Value Ref Range    Cholesterol 142 50 - 200 mg/dL Triglycerides 80 <=150 mg/dL    HDL, Direct 38 (L) >=40 mg/dL    LDL Calculated 88 0 - 100 mg/dL    Non-HDL-Chol (CHOL-HDL) 104 mg/dl       Orders Placed This Encounter   Procedures    Cologuard    CBC    Comprehensive metabolic panel    Lipid panel    TSH, 3rd generation    PSA, Total Screen       ALLERGIES:  Allergies   Allergen Reactions    Lisinopril Swelling    Shellfish Allergy - Food Allergy Other (See Comments)     Pt states it causes gout        Current Medications     Current Outpatient Medications   Medication Sig Dispense Refill    allopurinol (ZYLOPRIM) 300 mg tablet Take 1 tablet (300 mg total) by mouth daily 90 tablet 3    aspirin 325 mg tablet Take 325 mg by mouth as needed for mild pain      B Complex Vitamins (VITAMIN B COMPLEX PO) Take by mouth daily      metoprolol succinate (TOPROL-XL) 50 mg 24 hr tablet Take 1 tablet (50 mg total) by mouth daily 90 tablet 3    spironolactone (ALDACTONE) 25 mg tablet Take 1 tablet (25 mg total) by mouth daily 90 tablet 3    valsartan-hydrochlorothiazide (DIOVAN-HCT) 80-12 5 MG per tablet Take 1 tablet by mouth daily 90 tablet 3     No current facility-administered medications for this visit           Health Maintenance     Health Maintenance   Topic Date Due    Annual Physical  Never done    Colorectal Cancer Screening  12/26/2020    BMI: Followup Plan  02/25/2021    Influenza Vaccine (1) 09/01/2021    Fall Risk  03/01/2023    Depression Screening  03/01/2023    BMI: Adult  03/01/2023    DTaP,Tdap,and Td Vaccines (2 - Td or Tdap) 11/27/2028    Hepatitis C Screening  Completed    Pneumococcal Vaccine: 65+ Years  Completed    COVID-19 Vaccine  Completed    HIB Vaccine  Aged Out    Hepatitis B Vaccine  Aged Out    IPV Vaccine  Aged Out    Hepatitis A Vaccine  Aged Out    Meningococcal ACWY Vaccine  Aged Out    HPV Vaccine  Aged Out     Immunization History   Administered Date(s) Administered    COVID-19 MODERNA VACC 0 25 ML IM BOOSTER 12/07/2021    COVID-19 MODERNA VACC 0 5 ML IM 12/30/2020, 01/28/2021    INFLUENZA 11/18/2019, 10/20/2020    Pneumococcal Polysaccharide PPV23 11/03/2020    Tdap 11/27/2018    Zoster Vaccine Recombinant 05/06/2019, 00/96/8222       Mark Em MD

## 2022-03-01 NOTE — ASSESSMENT & PLAN NOTE
Well adult  Overall the patient appears to be in stable health  He will obtain blood work as ordered for further evaluation  Patient was willing to undergo Cologuard testing for colon cancer screening    We will make further recommendations pending results of test

## 2022-03-10 ENCOUNTER — APPOINTMENT (OUTPATIENT)
Dept: LAB | Facility: HOSPITAL | Age: 67
End: 2022-03-10
Payer: COMMERCIAL

## 2022-03-10 DIAGNOSIS — I10 BENIGN HYPERTENSION: ICD-10-CM

## 2022-03-10 DIAGNOSIS — E78.2 MIXED HYPERLIPIDEMIA: ICD-10-CM

## 2022-03-10 DIAGNOSIS — R35.1 NOCTURIA: ICD-10-CM

## 2022-03-10 LAB
ALBUMIN SERPL BCP-MCNC: 4 G/DL (ref 3.5–5)
ALP SERPL-CCNC: 61 U/L (ref 46–116)
ALT SERPL W P-5'-P-CCNC: 32 U/L (ref 12–78)
ANION GAP SERPL CALCULATED.3IONS-SCNC: 5 MMOL/L (ref 4–13)
AST SERPL W P-5'-P-CCNC: 19 U/L (ref 5–45)
BILIRUB SERPL-MCNC: 0.8 MG/DL (ref 0.2–1)
BUN SERPL-MCNC: 14 MG/DL (ref 5–25)
CALCIUM SERPL-MCNC: 8.8 MG/DL (ref 8.3–10.1)
CHLORIDE SERPL-SCNC: 106 MMOL/L (ref 100–108)
CHOLEST SERPL-MCNC: 175 MG/DL
CO2 SERPL-SCNC: 27 MMOL/L (ref 21–32)
CREAT SERPL-MCNC: 0.87 MG/DL (ref 0.6–1.3)
ERYTHROCYTE [DISTWIDTH] IN BLOOD BY AUTOMATED COUNT: 14 % (ref 11.6–15.1)
GFR SERPL CREATININE-BSD FRML MDRD: 88 ML/MIN/1.73SQ M
GLUCOSE P FAST SERPL-MCNC: 108 MG/DL (ref 65–99)
HCT VFR BLD AUTO: 43.6 % (ref 36.5–49.3)
HDLC SERPL-MCNC: 38 MG/DL
HGB BLD-MCNC: 15 G/DL (ref 12–17)
LDLC SERPL CALC-MCNC: 118 MG/DL (ref 0–100)
MCH RBC QN AUTO: 29.9 PG (ref 26.8–34.3)
MCHC RBC AUTO-ENTMCNC: 34.4 G/DL (ref 31.4–37.4)
MCV RBC AUTO: 87 FL (ref 82–98)
NONHDLC SERPL-MCNC: 137 MG/DL
PLATELET # BLD AUTO: 357 THOUSANDS/UL (ref 149–390)
PMV BLD AUTO: 10 FL (ref 8.9–12.7)
POTASSIUM SERPL-SCNC: 4.2 MMOL/L (ref 3.5–5.3)
PROT SERPL-MCNC: 7.6 G/DL (ref 6.4–8.2)
PSA SERPL-MCNC: 0.8 NG/ML (ref 0–4)
RBC # BLD AUTO: 5.01 MILLION/UL (ref 3.88–5.62)
SODIUM SERPL-SCNC: 138 MMOL/L (ref 136–145)
TRIGL SERPL-MCNC: 95 MG/DL
TSH SERPL DL<=0.05 MIU/L-ACNC: 1.91 UIU/ML (ref 0.36–3.74)
WBC # BLD AUTO: 6.55 THOUSAND/UL (ref 4.31–10.16)

## 2022-03-10 PROCEDURE — 85027 COMPLETE CBC AUTOMATED: CPT

## 2022-03-10 PROCEDURE — 36415 COLL VENOUS BLD VENIPUNCTURE: CPT

## 2022-03-10 PROCEDURE — 80061 LIPID PANEL: CPT

## 2022-03-10 PROCEDURE — G0103 PSA SCREENING: HCPCS

## 2022-03-10 PROCEDURE — 80053 COMPREHEN METABOLIC PANEL: CPT

## 2022-03-10 PROCEDURE — 84443 ASSAY THYROID STIM HORMONE: CPT

## 2022-03-16 LAB — COLOGUARD RESULT REPORTABLE: NORMAL

## 2022-05-26 ENCOUNTER — TELEMEDICINE (OUTPATIENT)
Dept: FAMILY MEDICINE CLINIC | Facility: CLINIC | Age: 67
End: 2022-05-26
Payer: COMMERCIAL

## 2022-05-26 VITALS — WEIGHT: 175 LBS | HEIGHT: 64 IN | BODY MASS INDEX: 29.88 KG/M2

## 2022-05-26 DIAGNOSIS — U07.1 COVID-19 VIRUS INFECTION: Primary | ICD-10-CM

## 2022-05-26 PROCEDURE — 99213 OFFICE O/P EST LOW 20 MIN: CPT | Performed by: FAMILY MEDICINE

## 2022-05-26 RX ORDER — AZITHROMYCIN 250 MG/1
TABLET, FILM COATED ORAL
Qty: 6 TABLET | Refills: 0 | Status: SHIPPED | OUTPATIENT
Start: 2022-05-26 | End: 2022-05-31

## 2022-05-26 RX ORDER — PREDNISONE 20 MG/1
TABLET ORAL
Qty: 10 TABLET | Refills: 0 | Status: SHIPPED | OUTPATIENT
Start: 2022-05-26

## 2022-05-26 NOTE — ASSESSMENT & PLAN NOTE
COVID-19 infection  Patient was given prescription for Zithromax Z-Oscar take as directed for 5 days  He was also given prednisone 40 mg take once daily with food for 5 days    Patient will call if symptoms persist after medication completed

## 2022-05-26 NOTE — PROGRESS NOTES
Virtual Regular Visit    Verification of patient location:    Patient is located in the following state in which I hold an active license [place]      Assessment/Plan:    Problem List Items Addressed This Visit        Other    COVID-19 virus infection - Primary     COVID-19 infection  Patient was given prescription for Zithromax Z-Oscar take as directed for 5 days  He was also given prednisone 40 mg take once daily with food for 5 days  Patient will call if symptoms persist after medication completed           Relevant Medications    azithromycin (Zithromax) 250 mg tablet    predniSONE 20 mg tablet               Reason for visit is   Chief Complaint   Patient presents with    COVID-19    Cough    Headache    Fever        Encounter provider Meche Dowell MD    Provider located at [address] [place] [id number]      Recent Visits  No visits were found meeting these conditions  Showing recent visits within past 7 days and meeting all other requirements  Today's Visits  Date Type Provider Dept   05/26/22 Telemedicine Meche Dowell MD [address] [place] [address] today's visits and meeting all other requirements  Future Appointments  No visits were found meeting these conditions  Showing future appointments within next 150 days and meeting all other requirements       The patient was identified by name and date of birth  Dominick Edmondson was informed that this is a telemedicine visit and that the visit is being conducted through [address] [place] [address] Now and patient was informed that this is a secure, HIPAA-compliant platform  He agrees to proceed     My office door was closed  No one else was in the room  He acknowledged consent and understanding of privacy and security of the video platform  The patient has agreed to participate and understands they can discontinue the visit at any time  Patient is aware this is a billable service       Kobe Bledsoe is a 74 y o  male      Patient in the office with history of feeling significant sore throat 4 days ago  He took a COVID test that was positive on May 23rd  He has had some low-grade fever and chills  He denies any congestion or coughing  He has been taking over-the-counter Tylenol  Past Medical History:   Diagnosis Date    Cardiomyopathy ([address] )     Gout     Hyperlipidemia     Hypertension     Inguinal hernia, right     Irregular heartbeat     Kidney stone     Kidney stones     Mediastinal mass     Anterior     Palpitations     Vertigo        Past Surgical History:   Procedure Laterality Date    HERNIA REPAIR      at age 1   [facility] Bilateral 5/13/2021    Procedure: REPAIR HERNIA INGUINAL, LAPAROSCOPIC with mesh;  Surgeon: Eve Longoria MD;  Location[facility];  Service: General       Current Outpatient Medications   Medication Sig Dispense Refill    allopurinol (ZYLOPRIM) 300 mg tablet Take 1 tablet (300 mg total) by mouth daily 90 tablet 3    aspirin 325 mg tablet Take 325 mg by mouth as needed for mild pain      azithromycin (Zithromax) 250 mg tablet Take 2 tablets (500 mg total) by mouth daily for 1 day, THEN 1 tablet (250 mg total) daily for 4 days  6 tablet 0    B Complex Vitamins (VITAMIN B COMPLEX PO) Take by mouth daily      metoprolol succinate (TOPROL-XL) 50 mg 24 hr tablet Take 1 tablet (50 mg total) by mouth daily 90 tablet 3    predniSONE 20 mg tablet 2 po q day with food 10 tablet 0    spironolactone (ALDACTONE) 25 mg tablet Take 1 tablet (25 mg total) by mouth daily 90 tablet 3    valsartan-hydrochlorothiazide (DIOVAN-HCT) 80-12 5 MG per tablet Take 1 tablet by mouth daily 90 tablet 3     No current facility-administered medications for this visit          Allergies   Allergen Reactions    Lisinopril Swelling    Shellfish Allergy - Food Allergy Other (See Comments)     Pt states it causes gout        Review of Systems   Constitutional: Positive for fatigue and fever  HENT: Positive for sore throat  Respiratory: Negative  Cardiovascular: Negative  Gastrointestinal: Negative  Genitourinary: Negative  Musculoskeletal: Positive for myalgias  Video Exam    Vitals:    05/26/22 1015   Weight: 79 4 kg (175 lb)   Height: 5' 4" (1 626 m)       Physical Exam  Constitutional:       General: He is not in acute distress  Appearance: Normal appearance  He is not ill-appearing  HENT:      Head: Normocephalic and atraumatic  Eyes:      General:         Right eye: No discharge  Left eye: No discharge  Extraocular Movements: Extraocular movements intact  Conjunctiva/sclera: Conjunctivae normal       Pupils: Pupils are equal, round, and reactive to light  Pulmonary:      Effort: No respiratory distress  Neurological:      General: No focal deficit present  Mental Status: He is alert and oriented to person, place, and time  Mental status is at baseline  Psychiatric:         Mood and Affect: Mood normal          Behavior: Behavior normal          Thought Content: Thought content normal          Judgment: Judgment normal           I spent 15 minutes directly with the patient during this visit    [address] verbally agrees to participate in [employer]  Pt is aware that [employer] could be limited without vital signs or the ability to perform a full hands-on physical Juanis Delude understands he or the provider may request at any time to terminate the video visit and request the patient to seek care or treatment in person

## 2022-05-31 DIAGNOSIS — I49.3 FREQUENT PVCS: ICD-10-CM

## 2022-05-31 RX ORDER — METOPROLOL SUCCINATE 50 MG/1
50 TABLET, EXTENDED RELEASE ORAL DAILY
Qty: 90 TABLET | Refills: 1 | Status: SHIPPED | OUTPATIENT
Start: 2022-05-31 | End: 2022-07-21 | Stop reason: SDUPTHER

## 2022-07-21 ENCOUNTER — OFFICE VISIT (OUTPATIENT)
Dept: CARDIOLOGY CLINIC | Facility: CLINIC | Age: 67
End: 2022-07-21
Payer: COMMERCIAL

## 2022-07-21 VITALS
DIASTOLIC BLOOD PRESSURE: 60 MMHG | WEIGHT: 172.2 LBS | HEIGHT: 64 IN | SYSTOLIC BLOOD PRESSURE: 138 MMHG | OXYGEN SATURATION: 97 % | HEART RATE: 67 BPM | BODY MASS INDEX: 29.4 KG/M2

## 2022-07-21 DIAGNOSIS — I49.3 FREQUENT PVCS: ICD-10-CM

## 2022-07-21 DIAGNOSIS — I42.0 DILATED CARDIOMYOPATHY (HCC): Primary | ICD-10-CM

## 2022-07-21 PROCEDURE — 99214 OFFICE O/P EST MOD 30 MIN: CPT | Performed by: INTERNAL MEDICINE

## 2022-07-21 RX ORDER — METOPROLOL SUCCINATE 50 MG/1
50 TABLET, EXTENDED RELEASE ORAL DAILY
Qty: 90 TABLET | Refills: 3 | Status: SHIPPED | OUTPATIENT
Start: 2022-07-21

## 2022-07-21 NOTE — PROGRESS NOTES
Cardiology Follow Up    Bharat Rojas  1955  [id number]  [address]  [phone]    1  Dilated cardiomyopathy ([address] )  Echo complete w/ contrast if indicated   2  Frequent PVCs  metoprolol succinate (TOPROL-XL) 50 mg 24 hr tablet       Interval History: Followup cardiomyopathy    He is doing well  No complaints today  No chest pain or dyspnea  Medical Problems             Problem List     Arthritis    Benign hypertension    GERD without esophagitis    Gout    Hyperlipidemia    Screening for colon cancer    Overview Signed 4/19/2018  4:19 PM by Tonya Collado     Added automatically from request for surgery [id number]         Other restrictive cardiomyopathy ([address] )    Mediastinal mass    JOSE ELIAS (obstructive sleep apnea)    Need for pneumococcal vaccination    Status post laparoscopic hernia repair    Other hemorrhoids    Well adult exam    COVID-19 virus infection              Past Medical History:   Diagnosis Date    Cardiomyopathy ([address] )     Gout     Hyperlipidemia     Hypertension     Inguinal hernia, right     Irregular heartbeat     Kidney stone     Kidney stones     Mediastinal mass     Anterior     Palpitations     Vertigo      Social History     Socioeconomic History    Marital status: /Civil Union     Spouse name: Not on file    Number of children: Not on file    Years of education: Not on file    Highest education level: Not on file   Occupational History    Occupation: fulltime   Tobacco Use    Smoking status: Never Smoker    Smokeless tobacco: Never Used    Tobacco comment: Former   Vaping Use    Vaping Use: Never used   Substance and Sexual Activity    Alcohol use:  Yes    Drug use: No    Sexual activity: Yes     Partners: Female   Other Topics Concern    Not on file   Social History Narrative    Not on file     Social Determinants of Health     Financial Resource Strain: Not on file   Food Insecurity: Not on file   Transportation Needs: Not on file   Physical Activity: Not on file   Stress: Not on file   Social Connections: Not on file   Intimate Partner Violence: Not on file   Housing Stability: Not on file      Family History   Problem Relation Age of Onset    Arthritis Mother     Hypertension Mother     Heart disease Mother         CHF   [place] Gout Father     Cancer Father     Gout Family     Hypertension Brother     Gout Brother     Hypertension Brother     Gout Brother     Hypertension Brother     Hypertension Brother     Scoliosis Brother     Substance Abuse Neg Hx     Alcohol abuse Neg Hx     Mental illness Neg Hx      Past Surgical History:   Procedure Laterality Date    HERNIA REPAIR      at age 1   [address] [facility] Bilateral 5/13/2021    Procedure: REPAIR HERNIA INGUINAL, LAPAROSCOPIC with mesh;  Surgeon: Connor Mathur MD;  Location: [facility];  Service: General       Current Outpatient Medications:     allopurinol (ZYLOPRIM) 300 mg tablet, Take 1 tablet (300 mg total) by mouth daily, Disp: 90 tablet, Rfl: 3    aspirin 325 mg tablet, Take 325 mg by mouth as needed for mild pain, Disp: , Rfl:     metoprolol succinate (TOPROL-XL) 50 mg 24 hr tablet, Take 1 tablet (50 mg total) by mouth daily, Disp: 90 tablet, Rfl: 3    spironolactone (ALDACTONE) 25 mg tablet, Take 1 tablet (25 mg total) by mouth daily, Disp: 90 tablet, Rfl: 3    valsartan-hydrochlorothiazide (DIOVAN-HCT) 80-12 5 MG per tablet, Take 1 tablet by mouth daily, Disp: 90 tablet, Rfl: 3    B Complex Vitamins (VITAMIN B COMPLEX PO), Take by mouth daily (Patient not taking: Reported on 7/21/2022), Disp: , Rfl:     predniSONE 20 mg tablet, 2 po q day with food (Patient not taking: Reported on 7/21/2022), Disp: 10 tablet, Rfl: 0  Allergies   Allergen Reactions    Lisinopril Swelling    Shellfish Allergy - Food Allergy Other (See Comments)     Pt states it causes gout        Labs:     Chemistry        Component Value Date/Time     06/28/2017 0000    K 4 2 03/10/2022 0745    K 5 0 06/28/2017 0000     03/10/2022 0745     06/28/2017 0000    CO2 27 03/10/2022 0745    CO2 20 06/28/2017 0000    BUN 14 03/10/2022 0745    BUN 10 06/28/2017 0000    CREATININE 0 87 03/10/2022 0745    CREATININE 0 76 06/28/2017 0000        Component Value Date/Time    CALCIUM 8 8 03/10/2022 0745    CALCIUM 9 3 06/28/2017 0000    ALKPHOS 61 03/10/2022 0745    ALKPHOS 60 06/28/2017 0000    AST 19 03/10/2022 0745    AST 29 06/28/2017 0000    ALT 32 03/10/2022 0745    ALT 31 06/28/2017 0000    BILITOT 0 4 06/28/2017 0000            Lab Results   Component Value Date    CHOL 175 06/28/2017     Lab Results   Component Value Date    HDL 38 (L) 03/10/2022    HDL 38 (L) 05/03/2021    HDL 34 (L) 03/13/2020     Lab Results   Component Value Date    LDLCALC 118 (H) 03/10/2022    LDLCALC 88 05/03/2021    LDLCALC 93 03/13/2020     Lab Results   Component Value Date    TRIG 95 03/10/2022    TRIG 80 05/03/2021    TRIG 70 03/13/2020     No results found for: CHOLHDL    Imaging: No results found  Review of Systems   Constitutional: Negative  HENT: Negative  Eyes: Negative  Cardiovascular: Negative  Respiratory: Negative  Endocrine: Negative  Hematologic/Lymphatic: Negative  Skin: Negative  Musculoskeletal: Negative  Gastrointestinal: Negative  Genitourinary: Negative  Neurological: Negative  Psychiatric/Behavioral: Negative  Allergic/Immunologic: Negative  Vitals:    07/21/22 0750   BP: 138/60   Pulse: 67   SpO2: 97%           Physical Exam  Vitals reviewed  Constitutional:       Appearance: Normal appearance  HENT:      Head: Normocephalic  Nose: Nose normal       Mouth/Throat:      Mouth: Mucous membranes are moist       Pharynx: Oropharynx is clear  Eyes:      General: No scleral icterus  Conjunctiva/sclera: Conjunctivae normal    Cardiovascular:      Rate and Rhythm: Normal rate and regular rhythm  Heart sounds: No murmur heard  No friction rub  No gallop  Pulmonary:      Effort: Pulmonary effort is normal  No respiratory distress  Breath sounds: Normal breath sounds  No wheezing or rales  Abdominal:      General: Abdomen is flat  Bowel sounds are normal  There is no distension  Palpations: Abdomen is soft  Tenderness: There is no abdominal tenderness  There is no guarding  Musculoskeletal:      Cervical back: Normal range of motion and neck supple  Right lower leg: No edema  Left lower leg: No edema  Skin:     General: Skin is warm and dry  Neurological:      General: No focal deficit present  Mental Status: He is alert and oriented to person, place, and time  Psychiatric:         Mood and Affect: Mood normal          Behavior: Behavior normal          Discussion/Summary:       1  Nonischemic Cardiomyopathy:  Overall doing well  Cardiac CT and Cardiac MRI are negative  His LVEF is now 45%  No changes today  Update echocardiogram       2  Frequent PVCs / PACs / PSVT: Continue metoprolol  Minimal symptoms        3  Hypertension: His BP is under adequate control  No changes today           The patient was counseled regarding diagnostic results, instructions for management, risk factor reductions, impressions  total time of encounter was 25 minutes and 15 minutes was spent counseling

## 2022-08-09 ENCOUNTER — HOSPITAL ENCOUNTER (OUTPATIENT)
Dept: NON INVASIVE DIAGNOSTICS | Facility: HOSPITAL | Age: 67
Discharge: HOME | End: 2022-08-09
Payer: COMMERCIAL

## 2022-08-09 VITALS
BODY MASS INDEX: 29.37 KG/M2 | WEIGHT: 172 LBS | HEIGHT: 64 IN | SYSTOLIC BLOOD PRESSURE: 138 MMHG | DIASTOLIC BLOOD PRESSURE: 60 MMHG | HEART RATE: 66 BPM

## 2022-08-09 DIAGNOSIS — I42.0 DILATED CARDIOMYOPATHY (HCC): ICD-10-CM

## 2022-08-09 LAB
AORTIC ROOT: 2.5 CM
APICAL FOUR CHAMBER EJECTION FRACTION: 45 %
ASCENDING AORTA: 2.8 CM
E WAVE DECELERATION TIME: 241 MS
FRACTIONAL SHORTENING: 21 % (ref 28–44)
INTERVENTRICULAR SEPTUM IN DIASTOLE (PARASTERNAL SHORT AXIS VIEW): 0.5 CM
INTERVENTRICULAR SEPTUM: 0.5 CM (ref 0.6–1.1)
LAAS-AP2: 23.7 CM2
LAAS-AP4: 15.5 CM2
LEFT ATRIUM SIZE: 3.4 CM
LEFT INTERNAL DIMENSION IN SYSTOLE: 4.2 CM (ref 2.1–4)
LEFT VENTRICULAR INTERNAL DIMENSION IN DIASTOLE: 5.3 CM (ref 3.5–6)
LEFT VENTRICULAR POSTERIOR WALL IN END DIASTOLE: 0.9 CM
LEFT VENTRICULAR STROKE VOLUME: 59 ML
LVSV (TEICH): 59 ML
MV E'TISSUE VEL-SEP: 9 CM/S
MV PEAK A VEL: 0.76 M/S
MV PEAK E VEL: 61 CM/S
MV STENOSIS PRESSURE HALF TIME: 70 MS
MV VALVE AREA P 1/2 METHOD: 3.14 CM2
RIGHT ATRIUM AREA SYSTOLE A4C: 13.7 CM2
RIGHT VENTRICLE ID DIMENSION: 3.3 CM
SL CV LEFT ATRIUM LENGTH A2C: 6.2 CM
SL CV LV EF: 45
SL CV PED ECHO LEFT VENTRICLE DIASTOLIC VOLUME (MOD BIPLANE) 2D: 136 ML
SL CV PED ECHO LEFT VENTRICLE SYSTOLIC VOLUME (MOD BIPLANE) 2D: 77 ML
TRICUSPID ANNULAR PLANE SYSTOLIC EXCURSION: 2.2 CM

## 2022-08-09 PROCEDURE — 93306 TTE W/DOPPLER COMPLETE: CPT | Performed by: INTERNAL MEDICINE

## 2022-08-09 PROCEDURE — 93306 TTE W/DOPPLER COMPLETE: CPT

## 2022-08-10 ENCOUNTER — TELEPHONE (OUTPATIENT)
Dept: CARDIOLOGY CLINIC | Facility: CLINIC | Age: 67
End: 2022-08-10

## 2022-08-10 NOTE — TELEPHONE ENCOUNTER
----- Message from Martin Shane MD sent at 8/10/2022  8:52 AM EDT -----  Looks fine  EF 45 which is unchanged  No changes to medications

## 2022-08-24 ENCOUNTER — APPOINTMENT (OUTPATIENT)
Dept: LAB | Facility: CLINIC | Age: 67
End: 2022-08-24
Payer: COMMERCIAL

## 2022-08-24 DIAGNOSIS — Z00.8 HEALTH EXAMINATION IN POPULATION SURVEY: ICD-10-CM

## 2022-08-24 LAB
CHOLEST SERPL-MCNC: 161 MG/DL
EST. AVERAGE GLUCOSE BLD GHB EST-MCNC: 114 MG/DL
HBA1C MFR BLD: 5.6 %
HDLC SERPL-MCNC: 36 MG/DL
LDLC SERPL CALC-MCNC: 96 MG/DL (ref 0–100)
NONHDLC SERPL-MCNC: 125 MG/DL
TRIGL SERPL-MCNC: 143 MG/DL

## 2022-08-24 PROCEDURE — 83036 HEMOGLOBIN GLYCOSYLATED A1C: CPT

## 2022-08-24 PROCEDURE — 36415 COLL VENOUS BLD VENIPUNCTURE: CPT

## 2022-08-24 PROCEDURE — 80061 LIPID PANEL: CPT

## 2022-10-11 PROBLEM — Z00.00 WELL ADULT EXAM: Status: RESOLVED | Noted: 2022-03-01 | Resolved: 2022-10-11

## 2022-10-12 PROBLEM — Z12.11 SCREENING FOR COLON CANCER: Status: RESOLVED | Noted: 2018-04-19 | Resolved: 2022-10-12

## 2022-10-17 DIAGNOSIS — I42.8 NONISCHEMIC CARDIOMYOPATHY (HCC): ICD-10-CM

## 2022-10-17 RX ORDER — SPIRONOLACTONE 25 MG/1
TABLET ORAL
Qty: 90 TABLET | Refills: 0 | Status: SHIPPED | OUTPATIENT
Start: 2022-10-17

## 2022-10-25 DIAGNOSIS — I10 BENIGN HYPERTENSION: ICD-10-CM

## 2022-10-25 RX ORDER — VALSARTAN AND HYDROCHLOROTHIAZIDE 80; 12.5 MG/1; MG/1
TABLET, FILM COATED ORAL
Qty: 90 TABLET | Refills: 0 | Status: SHIPPED | OUTPATIENT
Start: 2022-10-25

## 2023-01-16 DIAGNOSIS — I42.8 NONISCHEMIC CARDIOMYOPATHY (HCC): ICD-10-CM

## 2023-01-16 RX ORDER — SPIRONOLACTONE 25 MG/1
TABLET ORAL
Qty: 90 TABLET | Refills: 0 | Status: SHIPPED | OUTPATIENT
Start: 2023-01-16

## 2023-01-19 ENCOUNTER — OFFICE VISIT (OUTPATIENT)
Dept: CARDIOLOGY CLINIC | Facility: CLINIC | Age: 68
End: 2023-01-19

## 2023-01-19 VITALS
HEART RATE: 60 BPM | WEIGHT: 174 LBS | HEIGHT: 64 IN | SYSTOLIC BLOOD PRESSURE: 122 MMHG | DIASTOLIC BLOOD PRESSURE: 64 MMHG | BODY MASS INDEX: 29.71 KG/M2

## 2023-01-19 DIAGNOSIS — I42.8 NONISCHEMIC CARDIOMYOPATHY (HCC): ICD-10-CM

## 2023-01-19 DIAGNOSIS — I10 BENIGN HYPERTENSION: ICD-10-CM

## 2023-01-19 DIAGNOSIS — I49.3 FREQUENT PVCS: Primary | ICD-10-CM

## 2023-01-19 RX ORDER — METOPROLOL SUCCINATE 50 MG/1
50 TABLET, EXTENDED RELEASE ORAL DAILY
Qty: 90 TABLET | Refills: 3 | Status: SHIPPED | OUTPATIENT
Start: 2023-01-19

## 2023-01-19 RX ORDER — VALSARTAN AND HYDROCHLOROTHIAZIDE 80; 12.5 MG/1; MG/1
1 TABLET, FILM COATED ORAL DAILY
Qty: 90 TABLET | Refills: 3 | Status: SHIPPED | OUTPATIENT
Start: 2023-01-19

## 2023-01-19 NOTE — PROGRESS NOTES
Cardiology Follow Up    Elijah Archuleta  1955  [id number]  [facility]  [address] [id number] [phone]    1  Frequent PVCs  POCT ECG          Interval History: Followup cardiomyopathy and pvcs    Doing well  No chest pain, dyspnea or palpitations  Medical Problems     Problem List     Arthritis    Benign hypertension    GERD without esophagitis    Gout    Hyperlipidemia    Other restrictive cardiomyopathy (HCC)    Mediastinal mass    JOSE ELIAS (obstructive sleep apnea)    Need for pneumococcal vaccination    Status post laparoscopic hernia repair    Other hemorrhoids    COVID-19 virus infection        Past Medical History:   Diagnosis Date   • Cardiomyopathy ([address] )    • Gout    • Hyperlipidemia    • Hypertension    • Inguinal hernia, right    • Irregular heartbeat    • Kidney stone    • Kidney stones    • Mediastinal mass     Anterior    • Palpitations    • Vertigo      Social History     Socioeconomic History   • Marital status: /Civil Union     Spouse name: Not on file   • Number of children: Not on file   • Years of education: Not on file   • Highest education level: Not on file   Occupational History   • Occupation: fulltime   Tobacco Use   • Smoking status: Never   • Smokeless tobacco: Never   • Tobacco comments:     Former   Vaping Use   • Vaping Use: Never used   Substance and Sexual Activity   • Alcohol use:  Yes   • Drug use: No   • Sexual activity: Yes     Partners: Female   Other Topics Concern   • Not on file   Social History Narrative   • Not on file     Social Determinants of Health     Financial Resource Strain: Not on file   Food Insecurity: Not on file   Transportation Needs: Not on file   Physical Activity: Not on file   Stress: Not on file   Social Connections: Not on file   Intimate Partner Violence: Not on file   Housing Stability: Not on file      Family History   Problem Relation Age of Onset   • Arthritis Mother    • Hypertension Mother    • Heart disease Mother         CHF   • Gout Father    • Cancer Father    • Gout Family    • Hypertension Brother    • Gout Brother    • Hypertension Brother    • Gout Brother    • Hypertension Brother    • Hypertension Brother    • Scoliosis Brother    • Substance Abuse Neg Hx    • Alcohol abuse Neg Hx    • Mental illness Neg Hx      Past Surgical History:   Procedure Laterality Date   • HERNIA REPAIR      at age 1   • [address] Bilateral 5/13/2021    Procedure: REPAIR HERNIA INGUINAL, LAPAROSCOPIC with mesh;  Surgeon: Maisha Almonte MD;  Location: [facility];  Service: General       Current Outpatient Medications:   •  allopurinol (ZYLOPRIM) 300 mg tablet, Take 1 tablet (300 mg total) by mouth daily, Disp: 90 tablet, Rfl: 3  •  aspirin 325 mg tablet, Take 325 mg by mouth as needed for mild pain, Disp: , Rfl:   •  B Complex Vitamins (VITAMIN B COMPLEX PO), Take by mouth daily, Disp: , Rfl:   •  metoprolol succinate (TOPROL-XL) 50 mg 24 hr tablet, Take 1 tablet (50 mg total) by mouth daily, Disp: 90 tablet, Rfl: 3  •  spironolactone (ALDACTONE) 25 mg tablet, TAKE ONE TABLET BY MOUTH EVERY DAY, Disp: 90 tablet, Rfl: 0  •  valsartan-hydrochlorothiazide (DIOVAN-HCT) 80-12 5 MG per tablet, TAKE ONE TABLET BY MOUTH EVERY DAY, Disp: 90 tablet, Rfl: 0  Allergies   Allergen Reactions   • Lisinopril Swelling   • Shellfish Allergy - Food Allergy Other (See Comments)     Pt states it causes gout        Labs:     Chemistry        Component Value Date/Time     06/28/2017 0000    K 4 2 03/10/2022 0745    K 5 0 06/28/2017 0000     03/10/2022 0745     06/28/2017 0000    CO2 27 03/10/2022 0745    CO2 20 06/28/2017 0000    BUN 14 03/10/2022 0745    BUN 10 06/28/2017 0000    CREATININE 0 87 03/10/2022 0745    CREATININE 0 76 06/28/2017 0000        Component Value Date/Time    CALCIUM 8 8 03/10/2022 0745    CALCIUM 9 3 06/28/2017 0000    ALKPHOS 61 03/10/2022 0745    ALKPHOS 60 06/28/2017 0000    AST 19 03/10/2022 0745    AST 29 06/28/2017 0000    ALT 32 03/10/2022 0745    ALT 31 06/28/2017 0000    BILITOT 0 4 06/28/2017 0000            Lab Results   Component Value Date    CHOL 175 06/28/2017     Lab Results   Component Value Date    HDL 36 (L) 08/24/2022    HDL 38 (L) 03/10/2022    HDL 38 (L) 05/03/2021     Lab Results   Component Value Date    LDLCALC 96 08/24/2022    LDLCALC 118 (H) 03/10/2022    LDLCALC 88 05/03/2021     Lab Results   Component Value Date    TRIG 143 08/24/2022    TRIG 95 03/10/2022    TRIG 80 05/03/2021     No results found for: CHOLHDL    Imaging: No results found  EKG: NSR with PVCs  Review of Systems   Constitutional: Negative  HENT: Negative  Eyes: Negative  Cardiovascular: Negative  Respiratory: Negative  Endocrine: Negative  Hematologic/Lymphatic: Negative  Skin: Negative  Musculoskeletal: Negative  Gastrointestinal: Negative  Genitourinary: Negative  Neurological: Negative  Psychiatric/Behavioral: Negative  Allergic/Immunologic: Negative  Vitals:    01/19/23 0755   BP: 122/64   Pulse: 60           Physical Exam  Vitals reviewed  Constitutional:       Appearance: Normal appearance  HENT:      Head: Normocephalic  Nose: Nose normal       Mouth/Throat:      Mouth: Mucous membranes are moist       Pharynx: Oropharynx is clear  Eyes:      General: No scleral icterus  Conjunctiva/sclera: Conjunctivae normal    Cardiovascular:      Rate and Rhythm: Normal rate and regular rhythm  Heart sounds: No murmur heard  No friction rub  No gallop  Pulmonary:      Effort: Pulmonary effort is normal  No respiratory distress  Breath sounds: Normal breath sounds  No wheezing or rales  Abdominal:      General: Abdomen is flat  Bowel sounds are normal  There is no distension  Palpations: Abdomen is soft  Tenderness:  There is no abdominal tenderness  There is no guarding  Musculoskeletal:      Cervical back: Normal range of motion and neck supple  Right lower leg: No edema  Left lower leg: No edema  Skin:     General: Skin is warm and dry  Neurological:      General: No focal deficit present  Mental Status: He is alert and oriented to person, place, and time  Psychiatric:         Mood and Affect: Mood normal          Behavior: Behavior normal          Discussion/Summary:    1  Nonischemic Cardiomyopathy:  Overall doing well  Cardiac CT and Cardiac MRI are negative   LVEF 45%        2  Frequent PVCs / PACs / PSVT: Continue metoprolol  Minimal symptoms        3  Hypertension: His BP is under adequate control  No changes today        The patient was counseled regarding diagnostic results, instructions for management, risk factor reductions, impressions  total time of encounter was 25 minutes and 15 minutes was spent counseling

## 2023-01-25 ENCOUNTER — APPOINTMENT (OUTPATIENT)
Dept: LAB | Facility: CLINIC | Age: 68
End: 2023-01-25

## 2023-01-25 LAB
ANION GAP SERPL CALCULATED.3IONS-SCNC: 4 MMOL/L (ref 4–13)
BUN SERPL-MCNC: 13 MG/DL (ref 5–25)
CALCIUM SERPL-MCNC: 8.8 MG/DL (ref 8.3–10.1)
CHLORIDE SERPL-SCNC: 105 MMOL/L (ref 96–108)
CO2 SERPL-SCNC: 27 MMOL/L (ref 21–32)
CREAT SERPL-MCNC: 0.91 MG/DL (ref 0.6–1.3)
ERYTHROCYTE [DISTWIDTH] IN BLOOD BY AUTOMATED COUNT: 14.6 % (ref 11.6–15.1)
GFR SERPL CREATININE-BSD FRML MDRD: 85 ML/MIN/1.73SQ M
GLUCOSE P FAST SERPL-MCNC: 105 MG/DL (ref 65–99)
HCT VFR BLD AUTO: 45.5 % (ref 36.5–49.3)
HGB BLD-MCNC: 14.5 G/DL (ref 12–17)
MCH RBC QN AUTO: 29.8 PG (ref 26.8–34.3)
MCHC RBC AUTO-ENTMCNC: 31.9 G/DL (ref 31.4–37.4)
MCV RBC AUTO: 94 FL (ref 82–98)
PLATELET # BLD AUTO: 407 THOUSANDS/UL (ref 149–390)
PMV BLD AUTO: 9.9 FL (ref 8.9–12.7)
POTASSIUM SERPL-SCNC: 4.2 MMOL/L (ref 3.5–5.3)
RBC # BLD AUTO: 4.86 MILLION/UL (ref 3.88–5.62)
SODIUM SERPL-SCNC: 136 MMOL/L (ref 135–147)
WBC # BLD AUTO: 7.74 THOUSAND/UL (ref 4.31–10.16)

## 2023-01-31 ENCOUNTER — TELEPHONE (OUTPATIENT)
Dept: CARDIOLOGY CLINIC | Facility: CLINIC | Age: 68
End: 2023-01-31

## 2023-01-31 NOTE — TELEPHONE ENCOUNTER
----- Message from Devora Rivas MD sent at 1/31/2023  3:49 PM EST -----  nml  ----- Message -----  From: Lab, Background User  Sent: 1/25/2023   9:23 AM EST  To: Devora Rivas MD

## 2023-03-18 ENCOUNTER — APPOINTMENT (EMERGENCY)
Dept: RADIOLOGY | Facility: HOSPITAL | Age: 68
End: 2023-03-18

## 2023-03-18 ENCOUNTER — HOSPITAL ENCOUNTER (EMERGENCY)
Facility: HOSPITAL | Age: 68
Discharge: HOME | End: 2023-03-19
Attending: EMERGENCY MEDICINE

## 2023-03-18 VITALS
SYSTOLIC BLOOD PRESSURE: 162 MMHG | HEIGHT: 66 IN | TEMPERATURE: 97.8 F | HEART RATE: 68 BPM | RESPIRATION RATE: 15 BRPM | OXYGEN SATURATION: 99 % | WEIGHT: 175 LBS | DIASTOLIC BLOOD PRESSURE: 78 MMHG | BODY MASS INDEX: 28.12 KG/M2

## 2023-03-18 DIAGNOSIS — R42 DIZZINESS: Primary | ICD-10-CM

## 2023-03-18 LAB
ATRIAL RATE: 67 BPM
BASOPHILS # BLD AUTO: 0.03 THOUSANDS/ÂΜL (ref 0–0.1)
BASOPHILS NFR BLD AUTO: 0 % (ref 0–1)
EOSINOPHIL # BLD AUTO: 0.04 THOUSAND/ÂΜL (ref 0–0.61)
EOSINOPHIL NFR BLD AUTO: 0 % (ref 0–6)
ERYTHROCYTE [DISTWIDTH] IN BLOOD BY AUTOMATED COUNT: 14.6 % (ref 11.6–15.1)
HCT VFR BLD AUTO: 44.5 % (ref 36.5–49.3)
HGB BLD-MCNC: 15.1 G/DL (ref 12–17)
IMM GRANULOCYTES # BLD AUTO: 0.02 THOUSAND/UL (ref 0–0.2)
IMM GRANULOCYTES NFR BLD AUTO: 0 % (ref 0–2)
LYMPHOCYTES # BLD AUTO: 2.43 THOUSANDS/ÂΜL (ref 0.6–4.47)
LYMPHOCYTES NFR BLD AUTO: 20 % (ref 14–44)
MCH RBC QN AUTO: 30.9 PG (ref 26.8–34.3)
MCHC RBC AUTO-ENTMCNC: 33.9 G/DL (ref 31.4–37.4)
MCV RBC AUTO: 91 FL (ref 82–98)
MONOCYTES # BLD AUTO: 1.14 THOUSAND/ÂΜL (ref 0.17–1.22)
MONOCYTES NFR BLD AUTO: 9 % (ref 4–12)
NEUTROPHILS # BLD AUTO: 8.64 THOUSANDS/ÂΜL (ref 1.85–7.62)
NEUTS SEG NFR BLD AUTO: 71 % (ref 43–75)
NRBC BLD AUTO-RTO: 0 /100 WBCS
P AXIS: 67 DEGREES
PLATELET # BLD AUTO: 346 THOUSANDS/UL (ref 149–390)
PMV BLD AUTO: 10.3 FL (ref 8.9–12.7)
PR INTERVAL: 178 MS
QRS AXIS: 83 DEGREES
QRSD INTERVAL: 112 MS
QT INTERVAL: 398 MS
QTC INTERVAL: 420 MS
RBC # BLD AUTO: 4.88 MILLION/UL (ref 3.88–5.62)
T WAVE AXIS: 61 DEGREES
VENTRICULAR RATE: 67 BPM
WBC # BLD AUTO: 12.3 THOUSAND/UL (ref 4.31–10.16)

## 2023-03-18 RX ORDER — MECLIZINE HCL 12.5 MG/1
25 TABLET ORAL ONCE
Status: COMPLETED | OUTPATIENT
Start: 2023-03-18 | End: 2023-03-18

## 2023-03-18 RX ADMIN — MECLIZINE 25 MG: 12.5 TABLET ORAL at 23:27

## 2023-03-19 LAB
ALBUMIN SERPL BCP-MCNC: 4.5 G/DL (ref 3.5–5)
ALP SERPL-CCNC: 52 U/L (ref 34–104)
ALT SERPL W P-5'-P-CCNC: 21 U/L (ref 7–52)
ANION GAP SERPL CALCULATED.3IONS-SCNC: 11 MMOL/L (ref 4–13)
AST SERPL W P-5'-P-CCNC: 31 U/L (ref 13–39)
BILIRUB SERPL-MCNC: 0.46 MG/DL (ref 0.2–1)
BNP SERPL-MCNC: 100 PG/ML (ref 0–100)
BUN SERPL-MCNC: 23 MG/DL (ref 5–25)
CALCIUM SERPL-MCNC: 8.9 MG/DL (ref 8.4–10.2)
CARDIAC TROPONIN I PNL SERPL HS: 4 NG/L
CHLORIDE SERPL-SCNC: 100 MMOL/L (ref 96–108)
CO2 SERPL-SCNC: 24 MMOL/L (ref 21–32)
CREAT SERPL-MCNC: 0.88 MG/DL (ref 0.6–1.3)
FLUAV RNA RESP QL NAA+PROBE: NEGATIVE
FLUBV RNA RESP QL NAA+PROBE: NEGATIVE
GFR SERPL CREATININE-BSD FRML MDRD: 87 ML/MIN/1.73SQ M
GLUCOSE SERPL-MCNC: 98 MG/DL (ref 65–140)
MAGNESIUM SERPL-MCNC: 2.2 MG/DL (ref 1.9–2.7)
POTASSIUM SERPL-SCNC: 4 MMOL/L (ref 3.5–5.3)
PROT SERPL-MCNC: 7.7 G/DL (ref 6.4–8.4)
RSV RNA RESP QL NAA+PROBE: NEGATIVE
SARS-COV-2 RNA RESP QL NAA+PROBE: NEGATIVE
SODIUM SERPL-SCNC: 135 MMOL/L (ref 135–147)

## 2023-03-19 RX ORDER — MECLIZINE HYDROCHLORIDE 25 MG/1
25 TABLET ORAL 3 TIMES DAILY PRN
Qty: 30 TABLET | Refills: 0 | Status: SHIPPED | OUTPATIENT
Start: 2023-03-19

## 2023-03-19 NOTE — ED PROVIDER NOTES
History  Chief Complaint   Patient presents with   • Dizziness     Pt c/o of palpations and dizzness  26-year-old male presents for evaluation of intermittent palpitations that started a few days ago in addition to room spinning dizziness  Patient states that he is currently asymptomatic but does feel the sensation of a room spinning if he sits up or turns his head a certain direction  Patient states he felt his heart racing a few days ago but that has resolved  Denies chest pain at this time  Past medical history of cardiomyopathy  Prior to Admission Medications   Prescriptions Last Dose Informant Patient Reported? Taking?    B Complex Vitamins (VITAMIN B COMPLEX PO)   Yes No   Sig: Take by mouth daily   allopurinol (ZYLOPRIM) 300 mg tablet   No No   Sig: Take 1 tablet (300 mg total) by mouth daily   aspirin 325 mg tablet   Yes No   Sig: Take 325 mg by mouth as needed for mild pain   metoprolol succinate (TOPROL-XL) 50 mg 24 hr tablet   No No   Sig: Take 1 tablet (50 mg total) by mouth daily   spironolactone (ALDACTONE) 25 mg tablet   No No   Sig: TAKE ONE TABLET BY MOUTH EVERY DAY   valsartan-hydrochlorothiazide (DIOVAN-HCT) 80-12 5 MG per tablet   No No   Sig: Take 1 tablet by mouth daily      Facility-Administered Medications: None       Past Medical History:   Diagnosis Date   • Cardiomyopathy ([facility] [address] )    • Gout    • Hyperlipidemia    • Hypertension    • Inguinal hernia, right    • Irregular heartbeat    • Kidney stone    • Kidney stones    • Mediastinal mass     Anterior    • Palpitations    • Vertigo        Past Surgical History:   Procedure Laterality Date   • HERNIA REPAIR      at age 1   • VT LAPAROSCOPY SURG RPR INITIAL INGUINAL HERNIA Bilateral 5/13/2021    Procedure: REPAIR HERNIA INGUINAL, LAPAROSCOPIC with mesh;  Surgeon: Connor Mathur MD;  Location: [facility];  Service: General       Family History   Problem Relation Age of Onset   • Arthritis Mother    • Hypertension Mother    • Heart disease Mother         CHF   • Gout Father    • Cancer Father    • Gout Family    • Hypertension Brother    • Gout Brother    • Hypertension Brother    • Gout Brother    • Hypertension Brother    • Hypertension Brother    • Scoliosis Brother    • Substance Abuse Neg Hx    • Alcohol abuse Neg Hx    • Mental illness Neg Hx      I have reviewed and agree with the history as documented  E-Cigarette/Vaping   • E-Cigarette Use Never User      E-Cigarette/Vaping Substances   • Nicotine No    • THC No    • CBD No    • Flavoring No    • Other No    • Unknown No      Social History     Tobacco Use   • Smoking status: Never   • Smokeless tobacco: Never   • Tobacco comments:     Former   Vaping Use   • Vaping Use: Never used   Substance Use Topics   • Alcohol use: Yes   • Drug use: No       Review of Systems   Cardiovascular: Positive for palpitations  Neurological: Positive for dizziness  Physical Exam  Physical Exam  Vitals and nursing note reviewed  Constitutional:       General: He is not in acute distress  Appearance: He is well-developed  HENT:      Head: Normocephalic and atraumatic  Right Ear: External ear normal       Left Ear: External ear normal       Nose: Nose normal    Eyes:      General: No scleral icterus  Extraocular Movements: Extraocular movements intact  Pupils: Pupils are equal, round, and reactive to light  Pulmonary:      Effort: Pulmonary effort is normal  No respiratory distress  Abdominal:      General: There is no distension  Palpations: Abdomen is soft  Musculoskeletal:         General: No deformity  Normal range of motion  Cervical back: Normal range of motion and neck supple  Skin:     General: Skin is warm  Findings: No rash  Neurological:      General: No focal deficit present  Mental Status: He is alert        Gait: Gait normal       Comments: HINTS exam reassuring, no dysdiadochokinesis, finger to nose intact, heel-to-shin intact, negative Romberg, able to ambulate  Psychiatric:         Mood and Affect: Mood normal          Vital Signs  ED Triage Vitals [03/18/23 2312]   Temperature Pulse Respirations Blood Pressure SpO2   97 8 °F (36 6 °C) 68 15 162/78 99 %      Temp Source Heart Rate Source Patient Position - Orthostatic VS BP Location FiO2 (%)   Tympanic Right Lying -- --      Pain Score       No Pain           Vitals:    03/18/23 2312   BP: 162/78   Pulse: 68   Patient Position - Orthostatic VS: Lying         Visual Acuity  Visual Acuity    Flowsheet Row Most Recent Value   L Pupil Size (mm) 3   R Pupil Size (mm) 3          ED Medications  Medications   meclizine (ANTIVERT) tablet 25 mg (25 mg Oral Given 3/18/23 2327)       Diagnostic Studies  Results Reviewed     Procedure Component Value Units Date/Time    COVID19, Influenza A/B, RSV PCR, [facility] [[id number]]  (Normal) Collected: 03/18/23 2327    Lab Status: Final result Specimen: Nares from Nose Updated: 03/19/23 0010     SARS-CoV-2 Negative     INFLUENZA A PCR Negative     INFLUENZA B PCR Negative     RSV PCR Negative    Narrative:      FOR PEDIATRIC PATIENTS - copy/paste COVID Guidelines URL to browser: [email or web address]    SARS-CoV-2 assay is a Nucleic Acid Amplification assay intended for the  qualitative detection of nucleic acid from SARS-CoV-2 in nasopharyngeal  swabs  Results are for the presumptive identification of SARS-CoV-2 RNA  Positive results are indicative of infection with SARS-CoV-2, the virus  causing COVID-19, but do not rule out bacterial infection or co-infection  with other viruses  Laboratories within the [place] and its  territories are required to report all positive results to the appropriate  public health authorities  Negative results do not preclude SARS-CoV-2  infection and should not be used as the sole basis for treatment or other  patient management decisions   Negative results must be combined with  clinical observations, patient history, and epidemiological information  This test has not been FDA cleared or approved  This test has been authorized by FDA under an Emergency Use Authorization  (EUA)  This test is only authorized for the duration of time the  declaration that circumstances exist justifying the authorization of the  emergency use of an in vitro diagnostic tests for detection of SARS-CoV-2  virus and/or diagnosis of COVID-19 infection under section 564(b)(1) of  the Act, 21 U  S C  431PPK-8(T)(6), unless the authorization is terminated  or revoked sooner  The test has been validated but independent review by FDA  and CLIA is pending  Test performed using [employer] GeneXpert: This RT-PCR assay targets N2,  a region unique to SARS-CoV-2  A conserved region in the E-gene was chosen  for pan-Sarbecovirus detection which includes SARS-CoV-2  According to CMS-2020-01-R, this platform meets the definition of high-throughput technology      Comprehensive metabolic panel [[id number]] Collected: 03/18/23 2327    Lab Status: Final result Specimen: Blood from Arm, Right Updated: 03/19/23 0005     Sodium 135 mmol/L      Potassium 4 0 mmol/L      Chloride 100 mmol/L      CO2 24 mmol/L      ANION GAP 11 mmol/L      BUN 23 mg/dL      Creatinine 0 88 mg/dL      Glucose 98 mg/dL      Calcium 8 9 mg/dL      AST 31 U/L      ALT 21 U/L      Alkaline Phosphatase 52 U/L      Total Protein 7 7 g/dL      Albumin 4 5 g/dL      Total Bilirubin 0 46 mg/dL      eGFR 87 ml/min/1 73sq m     Narrative:      [place] guidelines for Chronic Kidney Disease (CKD):   •  Stage 1 with normal or high GFR (GFR > 90 mL/min/1 73 square meters)  •  Stage 2 Mild CKD (GFR = 60-89 mL/min/1 73 square meters)  •  Stage 3A Moderate CKD (GFR = 45-59 mL/min/1 73 square meters)  •  Stage 3B Moderate CKD (GFR = 30-44 mL/min/1 73 square meters)  •  Stage 4 Severe CKD (GFR = 15-29 mL/min/1 73 square meters)  •  Stage 5 End Stage CKD (GFR <15 mL/min/1 73 square meters)  Note: GFR calculation is accurate only with a steady state creatinine    Magnesium [[id number]]  (Normal) Collected: 03/18/23 2327    Lab Status: Final result Specimen: Blood from Arm, Right Updated: 03/19/23 0005     Magnesium 2 2 mg/dL     HS Troponin 0hr (reflex protocol) [[id number]]  (Normal) Collected: 03/18/23 2327    Lab Status: Final result Specimen: Blood from Arm, Right Updated: 03/19/23 0003     hs TnI 0hr 4 ng/L     HS Troponin I 2hr [[id number]]     Lab Status: No result Specimen: Blood     HS Troponin I 4hr [[id number]]     Lab Status: No result Specimen: Blood     B-Type Natriuretic Peptide(BNP) [[id number]]  (Normal) Collected: 03/18/23 2327    Lab Status: Final result Specimen: Blood from Arm, Right Updated: 03/19/23 0002      pg/mL     CBC and differential [[id number]]  (Abnormal) Collected: 03/18/23 2327    Lab Status: Final result Specimen: Blood from Arm, Right Updated: 03/18/23 2334     WBC 12 30 Thousand/uL      RBC 4 88 Million/uL      Hemoglobin 15 1 g/dL      Hematocrit 44 5 %      MCV 91 fL      MCH 30 9 pg      MCHC 33 9 g/dL      RDW 14 6 %      MPV 10 3 fL      Platelets 728 Thousands/uL      nRBC 0 /100 WBCs      Neutrophils Relative 71 %      Immat GRANS % 0 %      Lymphocytes Relative 20 %      Monocytes Relative 9 %      Eosinophils Relative 0 %      Basophils Relative 0 %      Neutrophils Absolute 8 64 Thousands/µL      Immature Grans Absolute 0 02 Thousand/uL      Lymphocytes Absolute 2 43 Thousands/µL      Monocytes Absolute 1 14 Thousand/µL      Eosinophils Absolute 0 04 Thousand/µL      Basophils Absolute 0 03 Thousands/µL                  X-ray chest 1 view portable    (Results Pending)              Procedures  Procedures         ED Course                                             Medical Decision Making  49-year-old male presenting with palpitations and dizziness  Obtain labs, EKG    Symptom control and reassess  Upon reassessment, patient reports complete resolution of symptoms after meclizine  Prescription provided  Follow-up with primary care provider  Return precautions discussed  Dizziness: acute illness or injury  Amount and/or Complexity of Data Reviewed  Independent Historian: spouse  External Data Reviewed: labs and radiology  Labs: ordered  Radiology: ordered  ECG/medicine tests: ordered and independent interpretation performed  Disposition  Final diagnoses:   Dizziness     Time reflects when diagnosis was documented in both MDM as applicable and the Disposition within this note     Time User Action Codes Description Comment    3/19/2023  1:30 AM Sofie Fields Add [R42] Dizziness       ED Disposition     ED Disposition   Discharge    Condition   Stable    Date/Time   Sun Mar 19, 2023  1:30 AM    Comment   Keny Roberts discharge to home/self care                 Follow-up Information     Follow up With Specialties Details Why Contact Info Additional Information    Alex Caballero MD Family Medicine In 1 week  [address]        [facility] [address] [facility] [address] Emergency Medicine  If symptoms worsen [address] [address]  [address] [facility], [address] [facility], [facility] [address]          Discharge Medication List as of 3/19/2023  1:30 AM      START taking these medications    Details   meclizine (ANTIVERT) 25 mg tablet Take 1 tablet (25 mg total) by mouth 3 (three) times a day as needed for dizziness, Starting Sun 3/19/2023, Normal         CONTINUE these medications which have NOT CHANGED    Details   allopurinol (ZYLOPRIM) 300 mg tablet Take 1 tablet (300 mg total) by mouth daily, Starting Tue 3/1/2022, Normal      aspirin 325 mg tablet Take 325 mg by mouth as needed for mild pain, Historical Med      B Complex Vitamins (VITAMIN B COMPLEX PO) Take by mouth daily, Historical Med      metoprolol succinate (TOPROL-XL) 50 mg 24 hr tablet Take 1 tablet (50 mg total) by mouth daily, Starting Thu 1/19/2023, Normal      spironolactone (ALDACTONE) 25 mg tablet TAKE ONE TABLET BY MOUTH EVERY DAY, Normal      valsartan-hydrochlorothiazide (DIOVAN-HCT) 80-12 5 MG per tablet Take 1 tablet by mouth daily, Starting u 1/19/2023, Normal             No discharge procedures on file      PDMP Review       Value Time User    PDMP Reviewed  Yes 5/13/2021 12:55 PM Sandrita Lazo MD          ED Provider  Electronically Signed by           Jessica Guevara DO  03/19/23 0154

## 2023-03-20 LAB
ATRIAL RATE: 67 BPM
P AXIS: 67 DEGREES
PR INTERVAL: 178 MS
QRS AXIS: 83 DEGREES
QRSD INTERVAL: 112 MS
QT INTERVAL: 398 MS
QTC INTERVAL: 420 MS
T WAVE AXIS: 61 DEGREES
VENTRICULAR RATE: 67 BPM

## 2023-03-22 DIAGNOSIS — M10.9 GOUT, UNSPECIFIED CAUSE, UNSPECIFIED CHRONICITY, UNSPECIFIED SITE: ICD-10-CM

## 2023-03-22 RX ORDER — ALLOPURINOL 300 MG/1
TABLET ORAL
Qty: 90 TABLET | Refills: 0 | Status: SHIPPED | OUTPATIENT
Start: 2023-03-22

## 2023-07-10 ENCOUNTER — TELEPHONE (OUTPATIENT)
Dept: HEMATOLOGY ONCOLOGY | Facility: CLINIC | Age: 68
End: 2023-07-10

## 2023-07-10 NOTE — TELEPHONE ENCOUNTER
Patient wanted to make a consult appt with Dr. Donato Rivera but had no Diagnoses. I advised we needs a Diagnoses to do a self referral and do the intake. Also advised we need a referral to be sent in also.

## 2023-07-16 DIAGNOSIS — I42.8 NONISCHEMIC CARDIOMYOPATHY (HCC): ICD-10-CM

## 2023-07-17 RX ORDER — SPIRONOLACTONE 25 MG/1
TABLET ORAL
Qty: 90 TABLET | Refills: 0 | Status: SHIPPED | OUTPATIENT
Start: 2023-07-17

## 2023-08-28 ENCOUNTER — OFFICE VISIT (OUTPATIENT)
Dept: FAMILY MEDICINE CLINIC | Facility: CLINIC | Age: 68
End: 2023-08-28
Payer: COMMERCIAL

## 2023-08-28 VITALS
TEMPERATURE: 98 F | HEART RATE: 57 BPM | RESPIRATION RATE: 16 BRPM | DIASTOLIC BLOOD PRESSURE: 72 MMHG | OXYGEN SATURATION: 98 % | SYSTOLIC BLOOD PRESSURE: 130 MMHG

## 2023-08-28 DIAGNOSIS — Z00.00 PERIODIC HEALTH ASSESSMENT, GENERAL SCREENING, ADULT: Primary | ICD-10-CM

## 2023-08-28 DIAGNOSIS — Z12.11 COLON CANCER SCREENING: ICD-10-CM

## 2023-08-28 DIAGNOSIS — I10 BENIGN HYPERTENSION: ICD-10-CM

## 2023-08-28 PROCEDURE — 99397 PER PM REEVAL EST PAT 65+ YR: CPT | Performed by: FAMILY MEDICINE

## 2023-08-29 PROBLEM — Z12.11 COLON CANCER SCREENING: Status: ACTIVE | Noted: 2023-08-29

## 2023-08-29 PROBLEM — Z00.00 PERIODIC HEALTH ASSESSMENT, GENERAL SCREENING, ADULT: Status: ACTIVE | Noted: 2023-08-29

## 2023-08-29 NOTE — ASSESSMENT & PLAN NOTE
Well adult. Overall the patient appears to be stable health. He will obtain blood work as ordered for further evaluation. We will make further recommendations pending results of test.  He was given referral for screening colonoscopy.

## 2023-08-29 NOTE — PROGRESS NOTES
FAMILY PRACTICE OFFICE VISIT       NAME: Rohan Bledsoe  AGE: 69 y.o. SEX: male       : 1955        MRN: [id number]    DATE: 2023  TIME: 6:02 AM    Assessment and Plan     Problem List Items Addressed This Visit        Cardiovascular and Mediastinum    Benign hypertension     Hypertension. The patient's blood pressure is stable at this time and he will continue current regimen of medications         Relevant Orders    Hemoglobin A1C    CBC    Comprehensive metabolic panel    Lipid panel    TSH, 3rd generation       Other    Periodic health assessment, general screening, adult - Primary     Well adult. Overall the patient appears to be stable health. He will obtain blood work as ordered for further evaluation. We will make further recommendations pending results of test.  He was given referral for screening colonoscopy. Relevant Orders    Hemoglobin A1C    CBC    Comprehensive metabolic panel    Lipid panel    TSH, 3rd generation    Ambulatory Referral to Colorectal Surgery    PSA, Total Screen    Colon cancer screening    Relevant Orders    Ambulatory Referral to Colorectal Surgery           Chief Complaint     Chief Complaint   Patient presents with   • Physical Exam       History of Present Illness     Patient in the office for annual wellness exam.  He denies any recent illness. He has been more busy with his full-time work at [employer] as well as taking care of his wife who was recently sick with a neurologic condition. He states he naturally gets tired from having to perform more duties around the house recently. Patient would like to have screening colonoscopy at this time. Patient does not obtain a regular form of aerobic exercise. Review of Systems   Review of Systems   Constitutional: Positive for fatigue. HENT: Negative. Eyes: Negative. Respiratory: Negative. Cardiovascular: Negative. Gastrointestinal: Negative. Genitourinary: Negative. Musculoskeletal: Negative. Skin: Negative. Neurological: Negative. Psychiatric/Behavioral: Negative.         Active Problem List     Patient Active Problem List   Diagnosis   • Arthritis   • Benign hypertension   • GERD without esophagitis   • Gout   • Hyperlipidemia   • Other restrictive cardiomyopathy (HCC)   • Mediastinal mass   • JOSE ELIAS (obstructive sleep apnea)   • Need for pneumococcal vaccination   • Status post laparoscopic hernia repair   • Other hemorrhoids   • COVID-19 virus infection   • Periodic health assessment, general screening, adult   • Colon cancer screening       Past Medical History:  Past Medical History:   Diagnosis Date   • Cardiomyopathy ([address])    • Gout    • Hyperlipidemia    • Hypertension    • Inguinal hernia, right    • Irregular heartbeat    • Kidney stone    • Kidney stones    • Mediastinal mass     Anterior    • Palpitations    • Vertigo        Past Surgical History:  Past Surgical History:   Procedure Laterality Date   • HERNIA REPAIR      at age 1   • FL LAPAROSCOPY SURG RPR INITIAL INGUINAL HERNIA Bilateral 5/13/2021    Procedure: REPAIR HERNIA INGUINAL, LAPAROSCOPIC with mesh;  Surgeon: Roya Sheridan MD;  Location: [facility];  Service: General       Family History:  Family History   Problem Relation Age of Onset   • Arthritis Mother    • Hypertension Mother    • Heart disease Mother         CHF   • Gout Father    • Cancer Father    • Gout Family    • Hypertension Brother    • Gout Brother    • Hypertension Brother    • Gout Brother    • Hypertension Brother    • Hypertension Brother    • Scoliosis Brother    • Substance Abuse Neg Hx    • Alcohol abuse Neg Hx    • Mental illness Neg Hx        Social History:  Social History     Socioeconomic History   • Marital status: /Civil Union     Spouse name: Not on file   • Number of children: Not on file   • Years of education: Not on file   • Highest education level: Not on file   Occupational History   • Occupation: fulltime   Tobacco Use   • Smoking status: Never   • Smokeless tobacco: Never   • Tobacco comments:     Former   Vaping Use   • Vaping Use: Never used   Substance and Sexual Activity   • Alcohol use: Yes   • Drug use: No   • Sexual activity: Yes     Partners: Female   Other Topics Concern   • Not on file   Social History Narrative   • Not on file     Social Determinants of Health     Financial Resource Strain: Not on file   Food Insecurity: Not on file   Transportation Needs: Not on file   Physical Activity: Not on file   Stress: Not on file   Social Connections: Not on file   Intimate Partner Violence: Not on file   Housing Stability: Not on file       Objective     Vitals:    08/28/23 1611   BP: 130/72   Pulse: 57   Resp: 16   Temp: 98 °F (36.7 °C)   SpO2: 98%     Wt Readings from Last 3 Encounters:   03/18/23 79.4 kg (175 lb)   01/19/23 78.9 kg (174 lb)   08/09/22 78 kg (172 lb)       Physical Exam  Constitutional:       General: He is not in acute distress. Appearance: Normal appearance. He is not ill-appearing. HENT:      Head: Normocephalic and atraumatic. Right Ear: Tympanic membrane, ear canal and external ear normal. There is no impacted cerumen. Left Ear: Tympanic membrane, ear canal and external ear normal. There is no impacted cerumen. Eyes:      General:         Right eye: No discharge. Left eye: No discharge. Extraocular Movements: Extraocular movements intact. Conjunctiva/sclera: Conjunctivae normal.      Pupils: Pupils are equal, round, and reactive to light. Neck:      Vascular: No carotid bruit. Cardiovascular:      Rate and Rhythm: Normal rate and regular rhythm. Heart sounds: Normal heart sounds. No murmur heard. Pulmonary:      Effort: Pulmonary effort is normal.      Breath sounds: Normal breath sounds. No wheezing, rhonchi or rales. Abdominal:      General: Abdomen is flat. Bowel sounds are normal. There is no distension.       Palpations: Abdomen is soft. Tenderness: There is no abdominal tenderness. There is no guarding or rebound. Musculoskeletal:      Right lower leg: No edema. Left lower leg: No edema. Lymphadenopathy:      Cervical: No cervical adenopathy. Skin:     Findings: No rash. Neurological:      General: No focal deficit present. Mental Status: He is alert and oriented to person, place, and time. Cranial Nerves: No cranial nerve deficit. Psychiatric:         Mood and Affect: Mood normal.         Behavior: Behavior normal.         Thought Content:  Thought content normal.         Judgment: Judgment normal.         Pertinent Laboratory/Diagnostic Studies:  Lab Results   Component Value Date    GLUCOSE 99 06/28/2017    BUN 23 03/18/2023    CREATININE 0.88 03/18/2023    CALCIUM 8.9 03/18/2023     06/28/2017    K 4.0 03/18/2023    CO2 24 03/18/2023     03/18/2023     Lab Results   Component Value Date    ALT 21 03/18/2023    AST 31 03/18/2023    ALKPHOS 52 03/18/2023    BILITOT 0.4 06/28/2017       Lab Results   Component Value Date    WBC 12.30 (H) 03/18/2023    HGB 15.1 03/18/2023    HCT 44.5 03/18/2023    MCV 91 03/18/2023     03/18/2023       No results found for: "TSH"    Lab Results   Component Value Date    CHOL 175 06/28/2017     Lab Results   Component Value Date    TRIG 143 08/24/2022     Lab Results   Component Value Date    HDL 36 (L) 08/24/2022     Lab Results   Component Value Date    LDLCALC 96 08/24/2022     Lab Results   Component Value Date    HGBA1C 5.6 08/24/2022       Results for orders placed or performed during the hospital encounter of 03/18/23   COVID19, Influenza A/B, RSV PCR, [facility]    Specimen: Nose; Nares   Result Value Ref Range    SARS-CoV-2 Negative Negative    INFLUENZA A PCR Negative Negative    INFLUENZA B PCR Negative Negative    RSV PCR Negative Negative   CBC and differential   Result Value Ref Range    WBC 12.30 (H) 4.31 - 10.16 Thousand/uL    RBC 4.88 3.88 - 5.62 Million/uL    Hemoglobin 15.1 12.0 - 17.0 g/dL    Hematocrit 44.5 36.5 - 49.3 %    MCV 91 82 - 98 fL    MCH 30.9 26.8 - 34.3 pg    MCHC 33.9 31.4 - 37.4 g/dL    RDW 14.6 11.6 - 15.1 %    MPV 10.3 8.9 - 12.7 fL    Platelets 510 441 - 073 Thousands/uL    nRBC 0 /100 WBCs    Neutrophils Relative 71 43 - 75 %    Immat GRANS % 0 0 - 2 %    Lymphocytes Relative 20 14 - 44 %    Monocytes Relative 9 4 - 12 %    Eosinophils Relative 0 0 - 6 %    Basophils Relative 0 0 - 1 %    Neutrophils Absolute 8.64 (H) 1.85 - 7.62 Thousands/µL    Immature Grans Absolute 0.02 0.00 - 0.20 Thousand/uL    Lymphocytes Absolute 2.43 0.60 - 4.47 Thousands/µL    Monocytes Absolute 1.14 0.17 - 1.22 Thousand/µL    Eosinophils Absolute 0.04 0.00 - 0.61 Thousand/µL    Basophils Absolute 0.03 0.00 - 0.10 Thousands/µL   HS Troponin 0hr (reflex protocol)   Result Value Ref Range    hs TnI 0hr 4 "Refer to ACS Flowchart"- see link ng/L   B-Type Natriuretic Peptide(BNP)   Result Value Ref Range     0 - 100 pg/mL   Comprehensive metabolic panel   Result Value Ref Range    Sodium 135 135 - 147 mmol/L    Potassium 4.0 3.5 - 5.3 mmol/L    Chloride 100 96 - 108 mmol/L    CO2 24 21 - 32 mmol/L    ANION GAP 11 4 - 13 mmol/L    BUN 23 5 - 25 mg/dL    Creatinine 0.88 0.60 - 1.30 mg/dL    Glucose 98 65 - 140 mg/dL    Calcium 8.9 8.4 - 10.2 mg/dL    AST 31 13 - 39 U/L    ALT 21 7 - 52 U/L    Alkaline Phosphatase 52 34 - 104 U/L    Total Protein 7.7 6.4 - 8.4 g/dL    Albumin 4.5 3.5 - 5.0 g/dL    Total Bilirubin 0.46 0.20 - 1.00 mg/dL    eGFR 87 ml/min/1.73sq m   Magnesium   Result Value Ref Range    Magnesium 2.2 1.9 - 2.7 mg/dL   ECG 12 lead   Result Value Ref Range    Ventricular Rate 67 BPM    Atrial Rate 67 BPM    SD Interval 178 ms    QRSD Interval 112 ms    QT Interval 398 ms    QTC Interval 420 ms    P [place] 67 degrees    QRS Axis 83 degrees    T Wave Axis 61 degrees       Orders Placed This Encounter   Procedures   • Hemoglobin A1C   • CBC   • Comprehensive metabolic panel   • Lipid panel   • TSH, 3rd generation   • PSA, Total Screen   • Ambulatory Referral to Colorectal Surgery       ALLERGIES:  Allergies   Allergen Reactions   • Lisinopril Swelling   • Shellfish Allergy - Food Allergy Other (See Comments)     Pt states it causes gout        Current Medications     Current Outpatient Medications   Medication Sig Dispense Refill   • allopurinol (ZYLOPRIM) 300 mg tablet TAKE ONE TABLET BY MOUTH EVERY DAY 90 tablet 0   • aspirin 325 mg tablet Take 325 mg by mouth as needed for mild pain     • B Complex Vitamins (VITAMIN B COMPLEX PO) Take by mouth daily     • metoprolol succinate (TOPROL-XL) 50 mg 24 hr tablet Take 1 tablet (50 mg total) by mouth daily 90 tablet 3   • spironolactone (ALDACTONE) 25 mg tablet TAKE ONE TABLET BY MOUTH EVERY DAY 90 tablet 0   • valsartan-hydrochlorothiazide (DIOVAN-HCT) 80-12.5 MG per tablet Take 1 tablet by mouth daily 90 tablet 3   • meclizine (ANTIVERT) 25 mg tablet Take 1 tablet (25 mg total) by mouth 3 (three) times a day as needed for dizziness (Patient not taking: Reported on 8/28/2023) 30 tablet 0     No current facility-administered medications for this visit.          Health Maintenance     Health Maintenance   Topic Date Due   • Colorectal Cancer Screening  12/26/2020   • BMI: Followup Plan  02/25/2021   • Pneumococcal Vaccine: 65+ Years (2 - PCV) 11/03/2021   • COVID-19 Vaccine (5 - Moderna series) 02/01/2022   • Annual Physical  03/01/2023   • Influenza Vaccine (1) 09/01/2023   • BMI: Adult  03/18/2024   • Fall Risk  08/28/2024   • Depression Screening  08/28/2024   • DTaP,Tdap,and Td Vaccines (2 - Td or Tdap) 11/27/2028   • Hepatitis C Screening  Completed   • HIB Vaccine  Aged Out   • IPV Vaccine  Aged Out   • Hepatitis A Vaccine  Aged Out   • Meningococcal ACWY Vaccine  Aged Out   • HPV Vaccine  Aged Out     Immunization History   Administered Date(s) Administered   • COVID-19 MODERNA VACC 0.25 ML IM BOOSTER 12/07/2021   • COVID-19 MODERNA VACC 0.5 ML IM 12/30/2020, 01/28/2021, 12/07/2021   • INFLUENZA 11/18/2019, 10/20/2020   • Pneumococcal Polysaccharide PPV23 11/03/2020   • Tdap 11/27/2018   • Zoster Vaccine Recombinant 05/06/2019, 66/42/6459       Ami Rosas MD

## 2023-09-05 ENCOUNTER — TELEPHONE (OUTPATIENT)
Age: 68
End: 2023-09-05

## 2023-09-05 NOTE — LETTER
Rohan Ladi  [address] [facility] [address]        FLEXIBLE COLONOSCOPY INSTRUCTIONS  PLEASE NOTE. .. AS OF JUNE 1, 2014, OUR OFFICE REQUIRES 72 HOURS NOTICE OF CANCELLATION/RESCHEDULE OF A PROCEDURE TO AVOID INCURRING A MISSED APPOINTMENT FEE. Your Colonoscopy Procedure has been scheduled at:[facility]  [address] [facility] [address]     The Date of your Procedure is: December 8, 2023       The hospital facility will contact you the evening prior to your scheduled procedure with your arrival time. Use the bowel preparation as directed. Check with your family doctor if you are taking a blood thinner (Coumadin, Plavix, Xarelto, Pradaxa, Gingko biloba, Ginseng, Feverfew, Abdias's Wort). We suggest stopping these for 3 days. Special instructions may be needed if you are taking aspirin or any aspirin-containing medication. Check with your family physician. If you are on DIABETIC MEDICATION (tablets or insulin) your doctor may make changes in your preparation. Take all medications usual unless otherwise instructed. As always, if you have any questions or concerns, feel free to call the office. Our  staff will be happy to connect you with one of the nurses to answer any questions or address any concerns you have regarding your procedure. Do not wear any jewelry the day of your procedure including wedding rings. Arrangements must be made for a responsible party to drive you home from the procedure. If you do not have a responsible family member or friend to drive you home, the procedure will not be done. [facility]ast or a taxi is not acceptable. It is important you notify our office of any insurance changes prior to your procedure and, if necessary, supply us with referrals from your primary care physician.           COLONOSCOPY PREPARATION INSTRUCTIONS    Purchase (prescription not required):  · 238 gram bottle of Miralax® (Glycolax®)  · 4 Dulcolax® (Bisacodyl) Laxative Tablets  · 64 oz. bottle of Gatorade® or your preference of a non-carbonated clear liquid - NOT RED OR PURPLE     One Day Prior to Colonoscopy Procedure  · Nothing to eat the day before your procedure, only clear liquids. · It is important that you drink plenty of clear liquids throughout the day to prevent dehydration. Clear Liquids include:  o Water/Iced Tea/Lemonade/Gatorade®/Black Coffee or tea (no milk or creamers  o Soft drinks: orange, ginger ale, cola, Pepsi®, Sprite®, 7Up®  o Phong-Aid® (lemonade or orange flavors only)  o Strained fruit juices without pulp such as apple, white grape, white cranberry  o Jell-O®, lemon, lime or orange (no fruit or toppings)  o Popsicles, Damien Ice (No Ice Cream, sherbets, or fruit bars)  o Chicken or beef bouillon/broth  DO NOT EAT OR DRINK ANYTHING RED OR PURPLE  DO NOT DRINK ANY ALCOHOLIC BEVERAGES  DIABETIC PATIENTS: Consult your physician    At 4:00 pm, take (2) Dulcolax® (Bisacodyl) Laxative Tablets. Swallow the tablets whole with an 8 oz. glass of water. At 8:00 pm, take the additional (2) Dulcolax® (Bisacodyl) Laxative Tablets with 8 oz. of water. The package may direct you not to exceed (2) tablets at any time but for the purpose of this examination, you should take (4) total.    Mix the 238 gm of Miralax® in 64 oz. of Gatorade® and shake the solution until the Miralax® is dissolved. You will drink half (32 oz) of this solution this evening, beginning between 4 and 6 o'clock. Drink 8 oz glassfuls at your own pace. It may take several hours to drink the solution. Remember to stay close to toilet facilities. DAY OF COLONOSCOPY PROCEDURE    Five (5) hours before your procedure, drink the other half (32 oz) of the Miralax®/Gatorade® mixture within a two (2) hour period. This may require you to get up very early if you are scheduled for an early procedure.     NOTHING IS TO BE TAKEN BY MOUTH 3 HOURS PRIOR TO PROCEDURE. If you use an inhaler, please bring it with you to your procedure.

## 2023-09-05 NOTE — TELEPHONE ENCOUNTER
NP; no prior fc; Screening colonsocopy; BMI 28    Scheduled 12/8/23 @ [facility]  Paperwork mailed to patient.

## 2023-09-06 ENCOUNTER — APPOINTMENT (OUTPATIENT)
Dept: LAB | Facility: CLINIC | Age: 68
End: 2023-09-06
Payer: COMMERCIAL

## 2023-09-06 DIAGNOSIS — I10 BENIGN HYPERTENSION: ICD-10-CM

## 2023-09-06 DIAGNOSIS — Z00.00 PERIODIC HEALTH ASSESSMENT, GENERAL SCREENING, ADULT: ICD-10-CM

## 2023-09-06 LAB
ALBUMIN SERPL BCP-MCNC: 4.2 G/DL (ref 3.5–5)
ALP SERPL-CCNC: 49 U/L (ref 34–104)
ALT SERPL W P-5'-P-CCNC: 15 U/L (ref 7–52)
ANION GAP SERPL CALCULATED.3IONS-SCNC: 7 MMOL/L
AST SERPL W P-5'-P-CCNC: 18 U/L (ref 13–39)
BILIRUB SERPL-MCNC: 0.53 MG/DL (ref 0.2–1)
BUN SERPL-MCNC: 16 MG/DL (ref 5–25)
CALCIUM SERPL-MCNC: 9.1 MG/DL (ref 8.4–10.2)
CHLORIDE SERPL-SCNC: 103 MMOL/L (ref 96–108)
CHOLEST SERPL-MCNC: 168 MG/DL
CO2 SERPL-SCNC: 26 MMOL/L (ref 21–32)
CREAT SERPL-MCNC: 0.8 MG/DL (ref 0.6–1.3)
ERYTHROCYTE [DISTWIDTH] IN BLOOD BY AUTOMATED COUNT: 14.4 % (ref 11.6–15.1)
GFR SERPL CREATININE-BSD FRML MDRD: 91 ML/MIN/1.73SQ M
GLUCOSE P FAST SERPL-MCNC: 102 MG/DL (ref 65–99)
HCT VFR BLD AUTO: 42.2 % (ref 36.5–49.3)
HDLC SERPL-MCNC: 42 MG/DL
HGB BLD-MCNC: 14.1 G/DL (ref 12–17)
LDLC SERPL CALC-MCNC: 98 MG/DL (ref 0–100)
MCH RBC QN AUTO: 30.8 PG (ref 26.8–34.3)
MCHC RBC AUTO-ENTMCNC: 33.4 G/DL (ref 31.4–37.4)
MCV RBC AUTO: 92 FL (ref 82–98)
NONHDLC SERPL-MCNC: 126 MG/DL
PLATELET # BLD AUTO: 353 THOUSANDS/UL (ref 149–390)
PMV BLD AUTO: 10 FL (ref 8.9–12.7)
POTASSIUM SERPL-SCNC: 4.7 MMOL/L (ref 3.5–5.3)
PROT SERPL-MCNC: 6.9 G/DL (ref 6.4–8.4)
PSA SERPL-MCNC: 0.69 NG/ML (ref 0–4)
RBC # BLD AUTO: 4.58 MILLION/UL (ref 3.88–5.62)
SODIUM SERPL-SCNC: 136 MMOL/L (ref 135–147)
TRIGL SERPL-MCNC: 142 MG/DL
TSH SERPL DL<=0.05 MIU/L-ACNC: 3.62 UIU/ML (ref 0.45–4.5)
WBC # BLD AUTO: 6.6 THOUSAND/UL (ref 4.31–10.16)

## 2023-09-06 PROCEDURE — G0103 PSA SCREENING: HCPCS

## 2023-09-06 PROCEDURE — 85027 COMPLETE CBC AUTOMATED: CPT

## 2023-09-06 PROCEDURE — 84443 ASSAY THYROID STIM HORMONE: CPT

## 2023-09-06 PROCEDURE — 83036 HEMOGLOBIN GLYCOSYLATED A1C: CPT

## 2023-09-06 PROCEDURE — 36415 COLL VENOUS BLD VENIPUNCTURE: CPT

## 2023-09-06 PROCEDURE — 80053 COMPREHEN METABOLIC PANEL: CPT

## 2023-09-06 PROCEDURE — 80061 LIPID PANEL: CPT

## 2023-09-07 LAB
EST. AVERAGE GLUCOSE BLD GHB EST-MCNC: 120 MG/DL
HBA1C MFR BLD: 5.8 %

## 2023-10-01 DIAGNOSIS — M10.9 GOUT, UNSPECIFIED CAUSE, UNSPECIFIED CHRONICITY, UNSPECIFIED SITE: ICD-10-CM

## 2023-10-01 RX ORDER — ALLOPURINOL 300 MG/1
TABLET ORAL
Qty: 90 TABLET | Refills: 0 | Status: SHIPPED | OUTPATIENT
Start: 2023-10-01

## 2023-10-13 DIAGNOSIS — I42.8 NONISCHEMIC CARDIOMYOPATHY (HCC): ICD-10-CM

## 2023-10-16 RX ORDER — SPIRONOLACTONE 25 MG/1
TABLET ORAL
Qty: 90 TABLET | Refills: 0 | Status: SHIPPED | OUTPATIENT
Start: 2023-10-16

## 2023-10-28 PROBLEM — Z12.11 COLON CANCER SCREENING: Status: RESOLVED | Noted: 2023-08-29 | Resolved: 2023-10-28

## 2023-10-28 PROBLEM — Z00.00 PERIODIC HEALTH ASSESSMENT, GENERAL SCREENING, ADULT: Status: RESOLVED | Noted: 2023-08-29 | Resolved: 2023-10-28

## 2023-11-21 ENCOUNTER — OFFICE VISIT (OUTPATIENT)
Dept: FAMILY MEDICINE CLINIC | Facility: CLINIC | Age: 68
End: 2023-11-21
Payer: COMMERCIAL

## 2023-11-21 VITALS
SYSTOLIC BLOOD PRESSURE: 110 MMHG | BODY MASS INDEX: 27.38 KG/M2 | DIASTOLIC BLOOD PRESSURE: 60 MMHG | WEIGHT: 170.38 LBS | RESPIRATION RATE: 18 BRPM | HEART RATE: 68 BPM | TEMPERATURE: 98 F | HEIGHT: 66 IN | OXYGEN SATURATION: 98 %

## 2023-11-21 DIAGNOSIS — I10 BENIGN HYPERTENSION: Primary | ICD-10-CM

## 2023-11-21 DIAGNOSIS — N45.1 EPIDIDYMITIS: ICD-10-CM

## 2023-11-21 PROCEDURE — 99213 OFFICE O/P EST LOW 20 MIN: CPT | Performed by: FAMILY MEDICINE

## 2023-11-21 RX ORDER — CIPROFLOXACIN 500 MG/1
500 TABLET, FILM COATED ORAL EVERY 12 HOURS SCHEDULED
Qty: 14 TABLET | Refills: 0 | Status: SHIPPED | OUTPATIENT
Start: 2023-11-21 | End: 2023-11-28

## 2023-11-21 NOTE — ASSESSMENT & PLAN NOTE
Epididymitis. Patient was given prescription for Cipro 500 mg to take twice daily for 1 week.   Patient will call if symptoms persist whereby we would order a scrotal ultrasound for further evaluation

## 2023-11-21 NOTE — PROGRESS NOTES
FAMILY PRACTICE OFFICE VISIT       NAME: Rohan Bledsoe  AGE: 77 y.o. SEX: male       : 1955        MRN: [id number]    DATE: 2023  TIME: 10:14 AM    Assessment and Plan     Problem List Items Addressed This Visit       Benign hypertension - Primary    Epididymitis     Epididymitis. Patient was given prescription for Cipro 500 mg to take twice daily for 1 week. Patient will call if symptoms persist whereby we would order a scrotal ultrasound for further evaluation         Relevant Medications    ciprofloxacin (CIPRO) 500 mg tablet           Chief Complaint     Chief Complaint   Patient presents with    R testicle swollen      X 1 week  pain        History of Present Illness     Patient in the office with a history of vasectomy approximately 30 years ago. Initially patient developed several infections postoperatively that required treatment. Since that time patient has noticed similar symptoms of testicular swelling and discomfort which may last 2 to 3 days and may occur every few years. However, patient has had similar symptoms which has been present for the past week. He denies any dysuria. He denies any fevers        Review of Systems   Review of Systems   Constitutional: Negative.     Genitourinary:         As per HPI       Active Problem List     Patient Active Problem List   Diagnosis    Arthritis    Benign hypertension    GERD without esophagitis    Gout    Hyperlipidemia    Other restrictive cardiomyopathy ([address])    Mediastinal mass    JOSE ELIAS (obstructive sleep apnea)    Need for pneumococcal vaccination    Status post laparoscopic hernia repair    Other hemorrhoids    COVID-19 virus infection    Epididymitis       Past Medical History:  Past Medical History:   Diagnosis Date    Cardiomyopathy ([address])     Gout     Hyperlipidemia     Hypertension     Inguinal hernia, right     Irregular heartbeat     Kidney stone     Kidney stones     Mediastinal mass     Anterior     Palpitations Vertigo        Past Surgical History:  Past Surgical History:   Procedure Laterality Date    HERNIA REPAIR      at age 1    [place] LAPAROSCOPY SURG RPR INITIAL INGUINAL HERNIA Bilateral 5/13/2021    Procedure: REPAIR HERNIA INGUINAL, LAPAROSCOPIC with mesh;  Surgeon: Poonam Dalton MD;  Location: [facility];  Service: General       Family History:  Family History   Problem Relation Age of Onset    Arthritis Mother     Hypertension Mother     Heart disease Mother         CHF    Gout Father     Cancer Father     Gout Family     Hypertension Brother     Gout Brother     Hypertension Brother     Gout Brother     Hypertension Brother     Hypertension Brother     Scoliosis Brother     Substance Abuse Neg Hx     Alcohol abuse Neg Hx     Mental illness Neg Hx        Social History:  Social History     Socioeconomic History    Marital status: /Civil Union     Spouse name: Not on file    Number of children: Not on file    Years of education: Not on file    Highest education level: Not on file   Occupational History    Occupation: fulltime   Tobacco Use    Smoking status: Never    Smokeless tobacco: Never    Tobacco comments:     Former   Vaping Use    Vaping Use: Never used   Substance and Sexual Activity    Alcohol use: Yes    Drug use: No    Sexual activity: Yes     Partners: Female   Other Topics Concern    Not on file   Social History Narrative    Not on file     Social Determinants of Health     Financial Resource Strain: Not on file   Food Insecurity: Not on file   Transportation Needs: Not on file   Physical Activity: Not on file   Stress: Not on file   Social Connections: Not on file   Intimate Partner Violence: Not on file   Housing Stability: Not on file       Objective     Vitals:    11/21/23 0914   BP: 110/60   Pulse: 68   Resp: 18   Temp: 98 °F (36.7 °C)   SpO2: 98%     Wt Readings from Last 3 Encounters:   11/21/23 77.3 kg (170 lb 6 oz)   03/18/23 79.4 kg (175 lb)   01/19/23 78.9 kg (174 lb)       Physical Exam  Constitutional:       Appearance: Normal appearance. Genitourinary:     Comments: Patient with minimally tender right posterior testicular area. Scrotum does not appear swollen or red. No unusual rashes. Neurological:      Mental Status: He is alert. Pertinent Laboratory/Diagnostic Studies:  Lab Results   Component Value Date    GLUCOSE 99 06/28/2017    BUN 16 09/06/2023    CREATININE 0.80 09/06/2023    CALCIUM 9.1 09/06/2023     06/28/2017    K 4.7 09/06/2023    CO2 26 09/06/2023     09/06/2023     Lab Results   Component Value Date    ALT 15 09/06/2023    AST 18 09/06/2023    ALKPHOS 49 09/06/2023    BILITOT 0.4 06/28/2017       Lab Results   Component Value Date    WBC 6.60 09/06/2023    HGB 14.1 09/06/2023    HCT 42.2 09/06/2023    MCV 92 09/06/2023     09/06/2023       No results found for: "TSH"    Lab Results   Component Value Date    CHOL 175 06/28/2017     Lab Results   Component Value Date    TRIG 142 09/06/2023     Lab Results   Component Value Date    HDL 42 09/06/2023     Lab Results   Component Value Date    LDLCALC 98 09/06/2023     Lab Results   Component Value Date    HGBA1C 5.8 (H) 09/06/2023       Results for orders placed or performed in visit on 09/06/23   Hemoglobin A1C   Result Value Ref Range    Hemoglobin A1C 5.8 (H) Normal 4.0-5.6%; PreDiabetic 5.7-6.4%;  Diabetic >=6.5%; Glycemic control for adults with diabetes <7.0% %     mg/dl   CBC   Result Value Ref Range    WBC 6.60 4.31 - 10.16 Thousand/uL    RBC 4.58 3.88 - 5.62 Million/uL    Hemoglobin 14.1 12.0 - 17.0 g/dL    Hematocrit 42.2 36.5 - 49.3 %    MCV 92 82 - 98 fL    MCH 30.8 26.8 - 34.3 pg    MCHC 33.4 31.4 - 37.4 g/dL    RDW 14.4 11.6 - 15.1 %    Platelets 378 536 - 265 Thousands/uL    MPV 10.0 8.9 - 12.7 fL   Comprehensive metabolic panel   Result Value Ref Range    Sodium 136 135 - 147 mmol/L    Potassium 4.7 3.5 - 5.3 mmol/L    Chloride 103 96 - 108 mmol/L    CO2 26 21 - 32 mmol/L ANION GAP 7 mmol/L    BUN 16 5 - 25 mg/dL    Creatinine 0.80 0.60 - 1.30 mg/dL    Glucose, Fasting 102 (H) 65 - 99 mg/dL    Calcium 9.1 8.4 - 10.2 mg/dL    AST 18 13 - 39 U/L    ALT 15 7 - 52 U/L    Alkaline Phosphatase 49 34 - 104 U/L    Total Protein 6.9 6.4 - 8.4 g/dL    Albumin 4.2 3.5 - 5.0 g/dL    Total Bilirubin 0.53 0.20 - 1.00 mg/dL    eGFR 91 ml/min/1.73sq m   Lipid panel   Result Value Ref Range    Cholesterol 168 See Comment mg/dL    Triglycerides 142 See Comment mg/dL    HDL, Direct 42 >=40 mg/dL    LDL Calculated 98 0 - 100 mg/dL    Non-HDL-Chol (CHOL-HDL) 126 mg/dl   TSH, 3rd generation   Result Value Ref Range    TSH 3RD GENERATON 3.616 0.450 - 4.500 uIU/mL   PSA, Total Screen   Result Value Ref Range    PSA 0.69 0.00 - 4.00 ng/mL       No orders of the defined types were placed in this encounter.       ALLERGIES:  Allergies   Allergen Reactions    Lisinopril Swelling    Shellfish Allergy - Food Allergy Other (See Comments)     Pt states it causes gout        Current Medications     Current Outpatient Medications   Medication Sig Dispense Refill    allopurinol (ZYLOPRIM) 300 mg tablet TAKE ONE TABLET BY MOUTH EVERY DAY 90 tablet 0    aspirin 325 mg tablet Take 325 mg by mouth as needed for mild pain      B Complex Vitamins (VITAMIN B COMPLEX PO) Take by mouth daily      ciprofloxacin (CIPRO) 500 mg tablet Take 1 tablet (500 mg total) by mouth every 12 (twelve) hours for 7 days 14 tablet 0    metoprolol succinate (TOPROL-XL) 50 mg 24 hr tablet Take 1 tablet (50 mg total) by mouth daily 90 tablet 3    spironolactone (ALDACTONE) 25 mg tablet TAKE ONE TABLET BY MOUTH EVERY DAY 90 tablet 0    valsartan-hydrochlorothiazide (DIOVAN-HCT) 80-12.5 MG per tablet Take 1 tablet by mouth daily 90 tablet 3    meclizine (ANTIVERT) 25 mg tablet Take 1 tablet (25 mg total) by mouth 3 (three) times a day as needed for dizziness (Patient not taking: Reported on 8/28/2023) 30 tablet 0     No current facility-administered medications for this visit.          Health Maintenance     Health Maintenance   Topic Date Due    Colorectal Cancer Screening  12/26/2020    BMI: Followup Plan  02/25/2021    Pneumococcal Vaccine: 65+ Years (2 - PCV) 11/03/2021    COVID-19 Vaccine (5 - Moderna series) 02/01/2022    Influenza Vaccine (1) 06/30/2024 (Originally 9/1/2023)    Fall Risk  08/28/2024    Depression Screening  08/28/2024    Annual Physical  08/28/2024    BMI: Adult  11/21/2024    DTaP,Tdap,and Td Vaccines (2 - Td or Tdap) 11/27/2028    Hepatitis C Screening  Completed    HIB Vaccine  Aged Out    IPV Vaccine  Aged Out    Hepatitis A Vaccine  Aged Out    Meningococcal ACWY Vaccine  Aged Out    HPV Vaccine  Aged Out     Immunization History   Administered Date(s) Administered    COVID-19 MODERNA VACC 0.25 ML IM BOOSTER 12/07/2021    COVID-19 MODERNA VACC 0.5 ML IM 12/30/2020, 01/28/2021, 12/07/2021    INFLUENZA 11/18/2019, 10/20/2020    Pneumococcal Polysaccharide PPV23 11/03/2020    Tdap 11/27/2018    Zoster Vaccine Recombinant 05/06/2019, 37/79/9726       Tami Gill MD

## 2023-12-05 ENCOUNTER — TELEPHONE (OUTPATIENT)
Age: 68
End: 2023-12-05

## 2023-12-05 NOTE — TELEPHONE ENCOUNTER
Patients GI provider:  Dr. Ej Henry    Number to return call: [phone]    Reason for call: Pt calling moved procedure 12/8 due to wife breaking her leg next maren was 3/8     Scheduled procedure/appointment date if applicable: Apt/procedure 3/8

## 2024-01-02 DIAGNOSIS — M10.9 GOUT, UNSPECIFIED CAUSE, UNSPECIFIED CHRONICITY, UNSPECIFIED SITE: ICD-10-CM

## 2024-01-02 RX ORDER — ALLOPURINOL 300 MG/1
TABLET ORAL
Qty: 90 TABLET | Refills: 0 | Status: SHIPPED | OUTPATIENT
Start: 2024-01-02

## 2024-01-22 ENCOUNTER — TELEPHONE (OUTPATIENT)
Dept: CARDIOLOGY CLINIC | Facility: CLINIC | Age: 69
End: 2024-01-22

## 2024-01-22 DIAGNOSIS — I10 BENIGN HYPERTENSION: ICD-10-CM

## 2024-01-22 RX ORDER — VALSARTAN AND HYDROCHLOROTHIAZIDE 80; 12.5 MG/1; MG/1
1 TABLET, FILM COATED ORAL DAILY
Qty: 90 TABLET | Refills: 0 | Status: SHIPPED | OUTPATIENT
Start: 2024-01-22

## 2024-01-22 NOTE — TELEPHONE ENCOUNTER
Patient is requesting an immediate refill of Valsartan-HCTZ. He states he originally called last week and  is out of medication. Please send to to [facility]

## 2024-02-22 DIAGNOSIS — I49.3 FREQUENT PVCS: ICD-10-CM

## 2024-02-22 RX ORDER — METOPROLOL SUCCINATE 50 MG/1
50 TABLET, EXTENDED RELEASE ORAL DAILY
Qty: 90 TABLET | Refills: 0 | Status: SHIPPED | OUTPATIENT
Start: 2024-02-22

## 2024-02-22 NOTE — TELEPHONE ENCOUNTER
Please call patient regarding his metoprolol prescription.  Per patient , he requested a refill via [facility] on 2/20/24, but the requested has yet to be verified by the provider's office.  He has 1 pill left.

## 2024-04-02 DIAGNOSIS — M10.9 GOUT, UNSPECIFIED CAUSE, UNSPECIFIED CHRONICITY, UNSPECIFIED SITE: ICD-10-CM

## 2024-04-02 RX ORDER — ALLOPURINOL 300 MG/1
TABLET ORAL
Qty: 90 TABLET | Refills: 0 | Status: SHIPPED | OUTPATIENT
Start: 2024-04-02

## 2024-04-11 DIAGNOSIS — I42.8 NONISCHEMIC CARDIOMYOPATHY (HCC): ICD-10-CM

## 2024-04-12 RX ORDER — SPIRONOLACTONE 25 MG/1
TABLET ORAL
Qty: 90 TABLET | Refills: 0 | Status: SHIPPED | OUTPATIENT
Start: 2024-04-12

## 2024-04-12 NOTE — TELEPHONE ENCOUNTER
Requested medication(s) are due for refill today: Yes  Patient has already received a courtesy refill: No  Other reason request has been forwarded to provider: pt made aware he needs to schedule an OV.

## 2024-04-19 DIAGNOSIS — I10 BENIGN HYPERTENSION: ICD-10-CM

## 2024-04-19 RX ORDER — VALSARTAN AND HYDROCHLOROTHIAZIDE 80; 12.5 MG/1; MG/1
1 TABLET, FILM COATED ORAL DAILY
Qty: 90 TABLET | Refills: 0 | Status: SHIPPED | OUTPATIENT
Start: 2024-04-19

## 2024-04-29 ENCOUNTER — OFFICE VISIT (OUTPATIENT)
Dept: FAMILY MEDICINE CLINIC | Facility: CLINIC | Age: 69
End: 2024-04-29
Payer: COMMERCIAL

## 2024-04-29 VITALS
TEMPERATURE: 98 F | OXYGEN SATURATION: 96 % | DIASTOLIC BLOOD PRESSURE: 70 MMHG | SYSTOLIC BLOOD PRESSURE: 110 MMHG | HEART RATE: 60 BPM | RESPIRATION RATE: 18 BRPM | BODY MASS INDEX: 26.2 KG/M2 | WEIGHT: 163 LBS | HEIGHT: 66 IN

## 2024-04-29 DIAGNOSIS — L60.0 INGROWN TOENAIL: Primary | ICD-10-CM

## 2024-04-29 DIAGNOSIS — Z12.11 COLON CANCER SCREENING: ICD-10-CM

## 2024-04-29 PROCEDURE — 99213 OFFICE O/P EST LOW 20 MIN: CPT | Performed by: FAMILY MEDICINE

## 2024-04-29 RX ORDER — AMOXICILLIN 875 MG/1
875 TABLET, COATED ORAL 2 TIMES DAILY
Qty: 20 TABLET | Refills: 0 | Status: SHIPPED | OUTPATIENT
Start: 2024-04-29 | End: 2024-05-09

## 2024-04-29 NOTE — ASSESSMENT & PLAN NOTE
Ingrown toenail.  Patient given prescription for amoxicillin 875 mg to use twice daily.  He will try to soak his toe in warm water for 10 minutes on a daily basis.  He was given referral to see [facility]'s podiatry for more permanent treatment option of his ingrown toenail

## 2024-04-29 NOTE — PROGRESS NOTES
FAMILY PRACTICE OFFICE VISIT       NAME: Rohan Bledsoe  AGE: 68 y.o. SEX: male       : 1955        MRN: [id number]    DATE: 2024  TIME: 11:07 AM    Assessment and Plan     Problem List Items Addressed This Visit       Ingrown toenail - Primary     Ingrown toenail.  Patient given prescription for amoxicillin 875 mg to use twice daily.  He will try to soak his toe in warm water for 10 minutes on a daily basis.  He was given referral to see [facility] podiatry for more permanent treatment option of his ingrown toenail         Relevant Medications    amoxicillin (AMOXIL) 875 mg tablet    Other Relevant Orders    Ambulatory Referral to Podiatry    Colon cancer screening    Relevant Orders    Cologuard           Chief Complaint     Chief Complaint   Patient presents with    Foot Problem     R foot big toe , hurt red x 1 month        History of Present Illness     Patient describes 4-week history of intermittent right toe discomfort with some redness.  He denies any acute trauma to the area.  He denies any documented fevers.        Review of Systems   Review of Systems   Constitutional: Negative.    Musculoskeletal:  Positive for arthralgias.   Skin:  Positive for color change.       Active Problem List     Patient Active Problem List   Diagnosis    Arthritis    Benign hypertension    GERD without esophagitis    Gout    Hyperlipidemia    Other restrictive cardiomyopathy (HCC)    Mediastinal mass    JOSE ELIAS (obstructive sleep apnea)    Need for pneumococcal vaccination    Status post laparoscopic hernia repair    Other hemorrhoids    COVID-19 virus infection    Epididymitis    Ingrown toenail    Colon cancer screening       Past Medical History:  Past Medical History:   Diagnosis Date    Cardiomyopathy (HCC)     Gout     Hyperlipidemia     Hypertension     Inguinal hernia, right     Irregular heartbeat     Kidney stone     Kidney stones     Mediastinal mass     Anterior     Palpitations     Vertigo         Past Surgical History:  Past Surgical History:   Procedure Laterality Date    HERNIA REPAIR      at age 1    [place] LAPAROSCOPY SURG RPR INITIAL INGUINAL HERNIA Bilateral 5/13/2021    Procedure: REPAIR HERNIA INGUINAL, LAPAROSCOPIC with mesh;  Surgeon: Andrea Chavez MD;  Location: [facility];  Service: General       Family History:  Family History   Problem Relation Age of Onset    Arthritis Mother     Hypertension Mother     Heart disease Mother         CHF    Gout Father     Cancer Father     Gout Family     Hypertension Brother     Gout Brother     Hypertension Brother     Gout Brother     Hypertension Brother     Hypertension Brother     Scoliosis Brother     Substance Abuse Neg Hx     Alcohol abuse Neg Hx     Mental illness Neg Hx        Social History:  Social History     Socioeconomic History    Marital status: /Civil Union     Spouse name: Not on file    Number of children: Not on file    Years of education: Not on file    Highest education level: Not on file   Occupational History    Occupation: fulltime   Tobacco Use    Smoking status: Never    Smokeless tobacco: Never    Tobacco comments:     Former   Vaping Use    Vaping status: Never Used   Substance and Sexual Activity    Alcohol use: Yes    Drug use: No    Sexual activity: Yes     Partners: Female   Other Topics Concern    Not on file   Social History Narrative    Not on file     Social Determinants of Health     Financial Resource Strain: Not on file   Food Insecurity: Not on file   Transportation Needs: Not on file   Physical Activity: Not on file   Stress: Not on file   Social Connections: Not on file   Intimate Partner Violence: Not on file   Housing Stability: Not on file       Objective     Vitals:    04/29/24 0717   BP: 110/70   Pulse: 60   Resp: 18   Temp: 98 °F (36.7 °C)   SpO2: 96%     Wt Readings from Last 3 Encounters:   04/29/24 73.9 kg (163 lb)   11/21/23 77.3 kg (170 lb 6 oz)   03/18/23 79.4 kg (175 lb)       Physical  "Exam  Constitutional:       General: He is not in acute distress.     Appearance: Normal appearance. He is not ill-appearing.   Musculoskeletal:         General: Swelling and tenderness present.      Comments: Right great toe has some mild erythema along medial and lateral edges of his toenail.  There is no discharge noted.  There does appear to be significant ingrowing of the toenail.  Toe itself is slightly tender to palpation.NROM   Skin:     Findings: Erythema present.   Neurological:      Mental Status: He is alert.         Pertinent Laboratory/Diagnostic Studies:  Lab Results   Component Value Date    GLUCOSE 99 06/28/2017    BUN 16 09/06/2023    CREATININE 0.80 09/06/2023    CALCIUM 9.1 09/06/2023     06/28/2017    K 4.7 09/06/2023    CO2 26 09/06/2023     09/06/2023     Lab Results   Component Value Date    ALT 15 09/06/2023    AST 18 09/06/2023    ALKPHOS 49 09/06/2023    BILITOT 0.4 06/28/2017       Lab Results   Component Value Date    WBC 6.60 09/06/2023    HGB 14.1 09/06/2023    HCT 42.2 09/06/2023    MCV 92 09/06/2023     09/06/2023       No results found for: \"TSH\"    Lab Results   Component Value Date    CHOL 175 06/28/2017     Lab Results   Component Value Date    TRIG 142 09/06/2023     Lab Results   Component Value Date    HDL 42 09/06/2023     Lab Results   Component Value Date    LDLCALC 98 09/06/2023     Lab Results   Component Value Date    HGBA1C 5.8 (H) 09/06/2023       Results for orders placed or performed in visit on 09/06/23   Hemoglobin A1C   Result Value Ref Range    Hemoglobin A1C 5.8 (H) Normal 4.0-5.6%; PreDiabetic 5.7-6.4%; Diabetic >=6.5%; Glycemic control for adults with diabetes <7.0% %     mg/dl   CBC   Result Value Ref Range    WBC 6.60 4.31 - 10.16 Thousand/uL    RBC 4.58 3.88 - 5.62 Million/uL    Hemoglobin 14.1 12.0 - 17.0 g/dL    Hematocrit 42.2 36.5 - 49.3 %    MCV 92 82 - 98 fL    MCH 30.8 26.8 - 34.3 pg    MCHC 33.4 31.4 - 37.4 g/dL    RDW 14.4 " 11.6 - 15.1 %    Platelets 353 149 - 390 Thousands/uL    MPV 10.0 8.9 - 12.7 fL   Comprehensive metabolic panel   Result Value Ref Range    Sodium 136 135 - 147 mmol/L    Potassium 4.7 3.5 - 5.3 mmol/L    Chloride 103 96 - 108 mmol/L    CO2 26 21 - 32 mmol/L    ANION GAP 7 mmol/L    BUN 16 5 - 25 mg/dL    Creatinine 0.80 0.60 - 1.30 mg/dL    Glucose, Fasting 102 (H) 65 - 99 mg/dL    Calcium 9.1 8.4 - 10.2 mg/dL    AST 18 13 - 39 U/L    ALT 15 7 - 52 U/L    Alkaline Phosphatase 49 34 - 104 U/L    Total Protein 6.9 6.4 - 8.4 g/dL    Albumin 4.2 3.5 - 5.0 g/dL    Total Bilirubin 0.53 0.20 - 1.00 mg/dL    eGFR 91 ml/min/1.73sq m   Lipid panel   Result Value Ref Range    Cholesterol 168 See Comment mg/dL    Triglycerides 142 See Comment mg/dL    HDL, Direct 42 >=40 mg/dL    LDL Calculated 98 0 - 100 mg/dL    Non-HDL-Chol (CHOL-HDL) 126 mg/dl   TSH, 3rd generation   Result Value Ref Range    TSH 3RD GENERATON 3.616 0.450 - 4.500 uIU/mL   PSA, Total Screen   Result Value Ref Range    PSA 0.69 0.00 - 4.00 ng/mL       Orders Placed This Encounter   Procedures    Cologuard    Ambulatory Referral to Podiatry       ALLERGIES:  Allergies   Allergen Reactions    Lisinopril Swelling    Shellfish Allergy - Food Allergy Other (See Comments)     Pt states it causes gout        Current Medications     Current Outpatient Medications   Medication Sig Dispense Refill    allopurinol (ZYLOPRIM) 300 mg tablet TAKE ONE TABLET BY MOUTH EVERY DAY 90 tablet 0    amoxicillin (AMOXIL) 875 mg tablet Take 1 tablet (875 mg total) by mouth 2 (two) times a day for 10 days 20 tablet 0    aspirin 325 mg tablet Take 325 mg by mouth as needed for mild pain      B Complex Vitamins (VITAMIN B COMPLEX PO) Take by mouth daily      metoprolol succinate (TOPROL-XL) 50 mg 24 hr tablet Take 1 tablet (50 mg total) by mouth daily 90 tablet 0    spironolactone (ALDACTONE) 25 mg tablet TAKE ONE TABLET BY MOUTH EVERY DAY 90 tablet 0    valsartan-hydrochlorothiazide  (DIOVAN-HCT) 80-12.5 MG per tablet Take 1 tablet by mouth daily 90 tablet 0    meclizine (ANTIVERT) 25 mg tablet Take 1 tablet (25 mg total) by mouth 3 (three) times a day as needed for dizziness (Patient not taking: Reported on 8/28/2023) 30 tablet 0     No current facility-administered medications for this visit.         Health Maintenance     Health Maintenance   Topic Date Due    Colorectal Cancer Screening  12/26/2020    Pneumococcal Vaccine: 65+ Years (2 of 2 - PCV) 11/03/2021    COVID-19 Vaccine (5 - 2023-24 season) 09/01/2023    Influenza Vaccine (1) 06/30/2024 (Originally 9/1/2023)    Fall Risk  08/28/2024    Annual Physical  08/28/2024    Depression Screening  04/29/2025    DTaP,Tdap,and Td Vaccines (2 - Td or Tdap) 11/27/2028    Hepatitis C Screening  Completed    Zoster Vaccine  Completed    HIB Vaccine  Aged Out    IPV Vaccine  Aged Out    Hepatitis A Vaccine  Aged Out    Meningococcal ACWY Vaccine  Aged Out    HPV Vaccine  Aged Out     Immunization History   Administered Date(s) Administered    COVID-19 MODERNA VACC 0.25 ML IM BOOSTER 12/07/2021    COVID-19 MODERNA VACC 0.5 ML IM 12/30/2020, 01/28/2021, 12/07/2021    INFLUENZA 11/18/2019, 10/20/2020    Pneumococcal Polysaccharide PPV23 11/03/2020    Tdap 11/27/2018    Zoster Vaccine Recombinant 05/06/2019, 07/09/2019       Rajinder Valles MD

## 2024-05-15 ENCOUNTER — OFFICE VISIT (OUTPATIENT)
Dept: PODIATRY | Facility: CLINIC | Age: 69
End: 2024-05-15
Payer: COMMERCIAL

## 2024-05-15 VITALS
DIASTOLIC BLOOD PRESSURE: 72 MMHG | HEART RATE: 55 BPM | SYSTOLIC BLOOD PRESSURE: 120 MMHG | WEIGHT: 163 LBS | HEIGHT: 66 IN | BODY MASS INDEX: 26.2 KG/M2

## 2024-05-15 DIAGNOSIS — M79.674 PAIN OF RIGHT GREAT TOE: Primary | ICD-10-CM

## 2024-05-15 DIAGNOSIS — L60.0 INGROWN TOENAIL: ICD-10-CM

## 2024-05-15 PROCEDURE — 11730 AVULSION NAIL PLATE SIMPLE 1: CPT | Performed by: PODIATRIST

## 2024-05-15 PROCEDURE — 99242 OFF/OP CONSLTJ NEW/EST SF 20: CPT | Performed by: PODIATRIST

## 2024-05-15 NOTE — PROGRESS NOTES
"               PATIENT:  Rohan Bledsoe  1955       ASSESSMENT:     1. Pain of right great toe        2. Ingrown toenail  Ambulatory Referral to Podiatry    Nail removal                PLAN:  1. Reviewed medical records.  Reviewed the note from PCP.  Patient was counseled and educated on the condition and the diagnosis.    2. Discussed options with the patient.  The patient wished to proceed with partial nail avulsion.  See procedure.    3. Home care instructions were given to the patient including decreased activity, ice and home pain medication as needed for 3 days. Patient will also perform daily dressing changes until the surgical site is fully healed.    4. He will return in 2-3 weeks.      Imaging: I have personally reviewed pertinent films in PACS  Labs, pathology, and Other Studies: I have personally reviewed pertinent reports.      Nail removal    Date/Time: 5/15/2024 5:00 PM    Performed by: Roman Meyer DPM  Authorized by: Roman Meyer DPM    Patient location:  [facility] Protocol:  Consent: Verbal consent obtained.  Risks and benefits: risks, benefits and alternatives were discussed  Consent given by: patient  Time out: Immediately prior to procedure a \"time out\" was called to verify the correct patient, procedure, equipment, support staff and site/side marked as required.  Timeout called at: 5/15/2024 5:37 PM.  Patient understanding: patient states understanding of the procedure being performed  Site marked: the operative site was marked  Patient identity confirmed: verbally with patient    Location:     Foot:  R big toe  Pre-procedure details:     Skin preparation:  Betadine    Preparation: Patient was prepped and draped in the usual sterile fashion    Anesthesia (see MAR for exact dosages):     Anesthesia method:  Local infiltration    Local anesthetic:  Lidocaine 1% w/o epi  Nail Removal:     Nail removed:  Partial    Nail side:  Lateral  Post-procedure details:     Dressing:  4x4 sterile " gauze, antibiotic ointment and gauze roll    Patient tolerance of procedure:  Tolerated well, no immediate complications        Subjective:       HPI  The patient was referred to my office for pain in right great toe.  He related this to ingrown nail.  He had it for a month.  He was seen by his PCP and just finished the antibiotics.  His toe looks better, but .   No associated numbness or paresthesia.  No significant weakness or dysfunction.         The following portions of the patient's history were reviewed and updated as appropriate: allergies, current medications, past family history, past medical history, past social history, past surgical history and problem list.  All pertinent labs and images were reviewed.      Past Medical History  Past Medical History:   Diagnosis Date    Cardiomyopathy (HCC)     Gout     Hyperlipidemia     Hypertension     Inguinal hernia, right     Irregular heartbeat     Kidney stone     Kidney stones     Mediastinal mass     Anterior     Palpitations     Vertigo        Past Surgical History  Past Surgical History:   Procedure Laterality Date    HERNIA REPAIR      at age 1    NE LAPAROSCOPY SURG RPR INITIAL INGUINAL HERNIA Bilateral 5/13/2021    Procedure: REPAIR HERNIA INGUINAL, LAPAROSCOPIC with mesh;  Surgeon: Andrea Chavez MD;  Location: [facility];  Service: General        Allergies:  Lisinopril and Shellfish allergy - food allergy    Medications:  Current Outpatient Medications   Medication Sig Dispense Refill    allopurinol (ZYLOPRIM) 300 mg tablet TAKE ONE TABLET BY MOUTH EVERY DAY 90 tablet 0    aspirin 325 mg tablet Take 325 mg by mouth as needed for mild pain      B Complex Vitamins (VITAMIN B COMPLEX PO) Take by mouth daily      metoprolol succinate (TOPROL-XL) 50 mg 24 hr tablet Take 1 tablet (50 mg total) by mouth daily 90 tablet 0    spironolactone (ALDACTONE) 25 mg tablet TAKE ONE TABLET BY MOUTH EVERY DAY 90 tablet 0    valsartan-hydrochlorothiazide  "(DIOVAN-HCT) 80-12.5 MG per tablet Take 1 tablet by mouth daily 90 tablet 0    meclizine (ANTIVERT) 25 mg tablet Take 1 tablet (25 mg total) by mouth 3 (three) times a day as needed for dizziness (Patient not taking: Reported on 8/28/2023) 30 tablet 0     No current facility-administered medications for this visit.       Social History:  Social History     Socioeconomic History    Marital status: /Civil Union     Spouse name: None    Number of children: None    Years of education: None    Highest education level: None   Occupational History    Occupation: fulltime   Tobacco Use    Smoking status: Never    Smokeless tobacco: Never    Tobacco comments:     Former   Vaping Use    Vaping status: Never Used   Substance and Sexual Activity    Alcohol use: Yes    Drug use: No    Sexual activity: Yes     Partners: Female   Other Topics Concern    None   Social History Narrative    None     Social Determinants of Health     Financial Resource Strain: Not on file   Food Insecurity: Not on file   Transportation Needs: Not on file   Physical Activity: Not on file   Stress: Not on file   Social Connections: Not on file   Intimate Partner Violence: Not on file   Housing Stability: Not on file          Review of Systems   Constitutional:  Negative for chills and fever.   Respiratory:  Negative for cough and shortness of breath.    Cardiovascular:  Negative for chest pain.   Gastrointestinal:  Negative for nausea and vomiting.   Musculoskeletal:  Negative for gait problem.   Neurological:  Negative for weakness and numbness.         Objective:      /72   Pulse 55   Ht 5' 6\" (1.676 m)   Wt 73.9 kg (163 lb)   BMI 26.31 kg/m²          Physical Exam  Vitals reviewed.   Constitutional:       General: He is not in acute distress.     Appearance: He is not toxic-appearing or diaphoretic.   Cardiovascular:      Rate and Rhythm: Normal rate and regular rhythm.      Pulses: Normal pulses.           Dorsalis pedis pulses are " 2+ on the right side and 2+ on the left side.        Posterior tibial pulses are 2+ on the right side and 2+ on the left side.   Pulmonary:      Effort: Pulmonary effort is normal. No respiratory distress.   Musculoskeletal:         General: No signs of injury.      Right lower leg: No edema.      Left lower leg: No edema.      Right foot: No foot drop.      Left foot: No foot drop.   Skin:     General: Skin is warm.      Capillary Refill: Capillary refill takes less than 2 seconds.      Coloration: Skin is not cyanotic or mottled.      Findings: No abscess or wound.      Nails: There is no clubbing.      Comments: Incurvated lateral nail border right great toe with pain and mild redness.   Neurological:      General: No focal deficit present.      Mental Status: He is alert and oriented to person, place, and time.      Cranial Nerves: No cranial nerve deficit.      Sensory: No sensory deficit.      Motor: No weakness.      Coordination: Coordination normal.   Psychiatric:         Mood and Affect: Mood normal.         Behavior: Behavior normal.         Thought Content: Thought content normal.         Judgment: Judgment normal.

## 2024-05-16 LAB — COLOGUARD RESULT REPORTABLE: NEGATIVE

## 2024-05-21 DIAGNOSIS — I49.3 FREQUENT PVCS: ICD-10-CM

## 2024-05-21 RX ORDER — METOPROLOL SUCCINATE 50 MG/1
TABLET, EXTENDED RELEASE ORAL
Qty: 90 TABLET | Refills: 1 | Status: SHIPPED | OUTPATIENT
Start: 2024-05-21

## 2024-05-21 NOTE — TELEPHONE ENCOUNTER
Refill must be reviewed and completed by the office or provider. The refill is unable to be approved or denied by the medication management team.      Last seen 01.2023 upcoming appointment 08.2024 - Please review to see if the refill is appropriate.

## 2024-05-29 PROBLEM — Z12.11 COLON CANCER SCREENING: Status: RESOLVED | Noted: 2024-04-29 | Resolved: 2024-05-29

## 2024-06-12 ENCOUNTER — APPOINTMENT (OUTPATIENT)
Dept: LAB | Facility: CLINIC | Age: 69
End: 2024-06-12
Payer: COMMERCIAL

## 2024-06-12 DIAGNOSIS — Z00.8 ENCOUNTER FOR OTHER GENERAL EXAMINATION: ICD-10-CM

## 2024-06-12 LAB
CHOLEST SERPL-MCNC: 143 MG/DL
EST. AVERAGE GLUCOSE BLD GHB EST-MCNC: 108 MG/DL
HBA1C MFR BLD: 5.4 %
HDLC SERPL-MCNC: 46 MG/DL
LDLC SERPL CALC-MCNC: 81 MG/DL (ref 0–100)
NONHDLC SERPL-MCNC: 97 MG/DL
TRIGL SERPL-MCNC: 78 MG/DL

## 2024-06-12 PROCEDURE — 36415 COLL VENOUS BLD VENIPUNCTURE: CPT

## 2024-06-12 PROCEDURE — 83036 HEMOGLOBIN GLYCOSYLATED A1C: CPT

## 2024-06-12 PROCEDURE — 80061 LIPID PANEL: CPT

## 2024-07-08 ENCOUNTER — OFFICE VISIT (OUTPATIENT)
Dept: FAMILY MEDICINE CLINIC | Facility: CLINIC | Age: 69
End: 2024-07-08
Payer: COMMERCIAL

## 2024-07-08 VITALS
HEART RATE: 76 BPM | TEMPERATURE: 97.5 F | DIASTOLIC BLOOD PRESSURE: 70 MMHG | SYSTOLIC BLOOD PRESSURE: 126 MMHG | WEIGHT: 163.38 LBS | OXYGEN SATURATION: 97 % | BODY MASS INDEX: 26.26 KG/M2 | RESPIRATION RATE: 18 BRPM | HEIGHT: 66 IN

## 2024-07-08 DIAGNOSIS — M77.9 TENDONITIS: Primary | ICD-10-CM

## 2024-07-08 PROCEDURE — 99213 OFFICE O/P EST LOW 20 MIN: CPT | Performed by: FAMILY MEDICINE

## 2024-07-08 RX ORDER — MELOXICAM 15 MG/1
15 TABLET ORAL DAILY PRN
Qty: 30 TABLET | Refills: 3 | Status: SHIPPED | OUTPATIENT
Start: 2024-07-08

## 2024-07-08 NOTE — PROGRESS NOTES
FAMILY PRACTICE OFFICE VISIT       NAME: Rohan Bledsoe  AGE: 68 y.o. SEX: male       : 1955        MRN: [id number]    DATE: 2024  TIME: 9:47 AM    Assessment and Plan     Problem List Items Addressed This Visit       Tendonitis - Primary     Tendinitis of thumbs.  Patient was given prescription for meloxicam to take 50 mg once daily.  He will also wear thumb splints at bedtime.  He was recommended to apply ice to the affected area daily for at least 10 minutes.  If symptoms persist without improvement over the first 2 weeks patient will call the office and we will refer to orthopedic hand specialist         Relevant Medications    meloxicam (MOBIC) 15 mg tablet           Chief Complaint     Chief Complaint   Patient presents with    bilateral thumb pain        History of Present Illness     Patient in the office with a few weeks history of bilateral thumb discomfort.  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  He denies any significant weakness.        Review of Systems   Review of Systems   Constitutional: Negative.    Musculoskeletal:         As per HPI   Neurological: Negative.        Active Problem List     Patient Active Problem List   Diagnosis    Arthritis    Benign hypertension    GERD without esophagitis    Gout    Hyperlipidemia    Other restrictive cardiomyopathy (HCC)    Mediastinal mass    JOSE ELIAS (obstructive sleep apnea)    Need for pneumococcal vaccination    Status post laparoscopic hernia repair    Other hemorrhoids    COVID-19 virus infection    Epididymitis    Ingrown toenail    Tendonitis       Past Medical History:  Past Medical History:   Diagnosis Date    Cardiomyopathy (HCC)     Gout     Hyperlipidemia     Hypertension     Inguinal hernia, right     Irregular heartbeat     Kidney stone     Kidney stones     Mediastinal mass     Anterior     Palpitations     Vertigo        Past Surgical History:  Past Surgical History:   Procedure  Laterality Date    HERNIA REPAIR      at age 1    [place] LAPAROSCOPY SURG RPR INITIAL INGUINAL HERNIA Bilateral 5/13/2021    Procedure: REPAIR HERNIA INGUINAL, LAPAROSCOPIC with mesh;  Surgeon: Andrea Chavez MD;  Location: [facility];  Service: General       Family History:  Family History   Problem Relation Age of Onset    Arthritis Mother     Hypertension Mother     Heart disease Mother         CHF    Gout Father     Cancer Father     Gout Family     Hypertension Brother     Gout Brother     Hypertension Brother     Gout Brother     Hypertension Brother     Hypertension Brother     Scoliosis Brother     Substance Abuse Neg Hx     Alcohol abuse Neg Hx     Mental illness Neg Hx        Social History:  Social History     Socioeconomic History    Marital status: /Civil Union     Spouse name: Not on file    Number of children: Not on file    Years of education: Not on file    Highest education level: Not on file   Occupational History    Occupation: fulltime   Tobacco Use    Smoking status: Never    Smokeless tobacco: Never    Tobacco comments:     Former   Vaping Use    Vaping status: Never Used   Substance and Sexual Activity    Alcohol use: Yes    Drug use: No    Sexual activity: Yes     Partners: Female   Other Topics Concern    Not on file   Social History Narrative    Not on file     Social Determinants of Health     Financial Resource Strain: Not on file   Food Insecurity: Not on file   Transportation Needs: Not on file   Physical Activity: Not on file   Stress: Not on file   Social Connections: Not on file   Intimate Partner Violence: Not on file   Housing Stability: Not on file       Objective     Vitals:    07/08/24 0921   BP: 126/70   Pulse: 76   Resp: 18   Temp: 97.5 °F (36.4 °C)   SpO2: 97%     Wt Readings from Last 3 Encounters:   07/08/24 74.1 kg (163 lb 6 oz)   05/15/24 73.9 kg (163 lb)   04/29/24 73.9 kg (163 lb)       Physical Exam  Constitutional:       Appearance: Normal appearance.  "  Musculoskeletal:         General: Tenderness and deformity present. No swelling or signs of injury.      Comments: Normal range of motion of hands.  +5/5 muscle strength with grasping.  Minimal tenderness along PIP joint of bilateral thumbs.  Patient has chronic OA changes of DIP and PIP joints of his hands.  Negative Tinel's sign   Neurological:      Mental Status: He is alert.         Pertinent Laboratory/Diagnostic Studies:  Lab Results   Component Value Date    GLUCOSE 99 06/28/2017    BUN 16 09/06/2023    CREATININE 0.80 09/06/2023    CALCIUM 9.1 09/06/2023     06/28/2017    K 4.7 09/06/2023    CO2 26 09/06/2023     09/06/2023     Lab Results   Component Value Date    ALT 15 09/06/2023    AST 18 09/06/2023    ALKPHOS 49 09/06/2023    BILITOT 0.4 06/28/2017       Lab Results   Component Value Date    WBC 6.60 09/06/2023    HGB 14.1 09/06/2023    HCT 42.2 09/06/2023    MCV 92 09/06/2023     09/06/2023       No results found for: \"TSH\"    Lab Results   Component Value Date    CHOL 175 06/28/2017     Lab Results   Component Value Date    TRIG 78 06/12/2024     Lab Results   Component Value Date    HDL 46 06/12/2024     Lab Results   Component Value Date    LDLCALC 81 06/12/2024     Lab Results   Component Value Date    HGBA1C 5.4 06/12/2024       Results for orders placed or performed in visit on 06/12/24   Hemoglobin A1C   Result Value Ref Range    Hemoglobin A1C 5.4 Normal 4.0-5.6%; PreDiabetic 5.7-6.4%; Diabetic >=6.5%; Glycemic control for adults with diabetes <7.0% %     mg/dl   Lipid panel   Result Value Ref Range    Cholesterol 143 See Comment mg/dL    Triglycerides 78 See Comment mg/dL    HDL, Direct 46 >=40 mg/dL    LDL Calculated 81 0 - 100 mg/dL    Non-HDL-Chol (CHOL-HDL) 97 mg/dl       No orders of the defined types were placed in this encounter.      ALLERGIES:  Allergies   Allergen Reactions    Lisinopril Swelling    Shellfish Allergy - Food Allergy Other (See Comments)     " Pt states it causes gout        Current Medications     Current Outpatient Medications   Medication Sig Dispense Refill    allopurinol (ZYLOPRIM) 300 mg tablet TAKE ONE TABLET BY MOUTH EVERY DAY 90 tablet 0    aspirin 325 mg tablet Take 325 mg by mouth as needed for mild pain      B Complex Vitamins (VITAMIN B COMPLEX PO) Take by mouth daily      meloxicam (MOBIC) 15 mg tablet Take 1 tablet (15 mg total) by mouth daily as needed for moderate pain 30 tablet 3    metoprolol succinate (TOPROL-XL) 50 mg 24 hr tablet Take 1 tablet (50 mg total) bymouth daily 90 tablet 1    spironolactone (ALDACTONE) 25 mg tablet TAKE ONE TABLET BY MOUTH EVERY DAY 90 tablet 0    valsartan-hydrochlorothiazide (DIOVAN-HCT) 80-12.5 MG per tablet Take 1 tablet by mouth daily 90 tablet 0     No current facility-administered medications for this visit.         Health Maintenance     Health Maintenance   Topic Date Due    RSV Vaccine Age 60+ Years (1 - 1-dose 60+ series) Never done    Pneumococcal Vaccine: 65+ Years (2 of 2 - PCV) 11/03/2021    COVID-19 Vaccine (5 - 2023-24 season) 09/01/2023    Annual Physical  08/28/2024    Influenza Vaccine (1) 09/01/2024    Fall Risk  07/08/2025    Depression Screening  07/08/2025    Colorectal Cancer Screening  05/09/2027    DTaP,Tdap,and Td Vaccines (2 - Td or Tdap) 11/27/2028    Hepatitis C Screening  Completed    Zoster Vaccine  Completed    RSV Vaccine age 0-20 Months  Aged Out    HIB Vaccine  Aged Out    IPV Vaccine  Aged Out    Hepatitis A Vaccine  Aged Out    Meningococcal ACWY Vaccine  Aged Out    HPV Vaccine  Aged Out     Immunization History   Administered Date(s) Administered    COVID-19 MODERNA VACC 0.25 ML IM BOOSTER 12/07/2021    COVID-19 MODERNA VACC 0.5 ML IM 12/30/2020, 01/28/2021, 12/07/2021    INFLUENZA 11/18/2019, 10/20/2020    Pneumococcal Polysaccharide PPV23 11/03/2020    Tdap 11/27/2018    Zoster Vaccine Recombinant 05/06/2019, 07/09/2019       Rajinder Valles MD

## 2024-07-08 NOTE — ASSESSMENT & PLAN NOTE
Tendinitis of thumbs.  Patient was given prescription for meloxicam to take 50 mg once daily.  He will also wear thumb splints at bedtime.  He was recommended to apply ice to the affected area daily for at least 10 minutes.  If symptoms persist without improvement over the first 2 weeks patient will call the office and we will refer to orthopedic hand specialist   The patient is a 65 y/o female with PMH of HTN, COPD, and anxiety/depression, who presents with hypotension and lower back pain. Patient reported that her home health nurse noted hypotension and that she was more pale appearing. She also reported lower abdominal pain with dysuria. Patient denies any fevers but had some chills and weakness. Work up in the ER suggestive of pyelo.

## 2024-07-18 DIAGNOSIS — I10 BENIGN HYPERTENSION: ICD-10-CM

## 2024-07-19 RX ORDER — VALSARTAN AND HYDROCHLOROTHIAZIDE 80; 12.5 MG/1; MG/1
1 TABLET, FILM COATED ORAL DAILY
Qty: 100 TABLET | Refills: 1 | Status: SHIPPED | OUTPATIENT
Start: 2024-07-19

## 2024-07-19 NOTE — TELEPHONE ENCOUNTER
Refill must be reviewed and completed by the office or provider. The refill is unable to be approved or denied by the medication management team.      Last seen 01.2023, appt 08.2024 - Please review to see if the refill is appropriate.

## 2024-07-25 ENCOUNTER — TELEPHONE (OUTPATIENT)
Age: 69
End: 2024-07-25

## 2024-07-25 DIAGNOSIS — M77.9 TENDONITIS: Primary | ICD-10-CM

## 2024-07-25 NOTE — TELEPHONE ENCOUNTER
Referral placed in [facility] for [facility] orthopedic evaluation.  Please provide phone number and he may call for appointment with Dr. Estes

## 2024-07-25 NOTE — TELEPHONE ENCOUNTER
Pt calling in to advise  that he is still experiencing symptoms from previous visit 7/8. Pt mentioned possible Cortizone injections for pt's next steps and would like to receive a call on what the first step would be; please advise. Thank you!

## 2024-08-09 ENCOUNTER — OFFICE VISIT (OUTPATIENT)
Dept: OBGYN CLINIC | Facility: CLINIC | Age: 69
End: 2024-08-09
Payer: COMMERCIAL

## 2024-08-09 ENCOUNTER — APPOINTMENT (OUTPATIENT)
Dept: RADIOLOGY | Facility: CLINIC | Age: 69
End: 2024-08-09
Payer: COMMERCIAL

## 2024-08-09 VITALS
WEIGHT: 162 LBS | SYSTOLIC BLOOD PRESSURE: 120 MMHG | BODY MASS INDEX: 26.99 KG/M2 | DIASTOLIC BLOOD PRESSURE: 66 MMHG | HEART RATE: 58 BPM | HEIGHT: 65 IN

## 2024-08-09 DIAGNOSIS — M77.9 TENDONITIS: ICD-10-CM

## 2024-08-09 DIAGNOSIS — M79.642 PAIN IN BOTH HANDS: ICD-10-CM

## 2024-08-09 DIAGNOSIS — M18.0 PRIMARY OSTEOARTHRITIS OF BOTH FIRST CARPOMETACARPAL JOINTS: Primary | ICD-10-CM

## 2024-08-09 DIAGNOSIS — M79.641 PAIN IN BOTH HANDS: ICD-10-CM

## 2024-08-09 PROCEDURE — 73130 X-RAY EXAM OF HAND: CPT

## 2024-08-09 PROCEDURE — 99243 OFF/OP CNSLTJ NEW/EST LOW 30: CPT | Performed by: ORTHOPAEDIC SURGERY

## 2024-08-09 NOTE — PROGRESS NOTES
Assessment/Plan:  1. Primary osteoarthritis of both first carpometacarpal joints  Ambulatory Referral to PT/OT Hand Therapy    Thumb Cude comf/Cool      2. Tendonitis  Ambulatory Referral to Orthopedic Surgery      3. Pain in both hands  XR hand 3+ vw left    XR hand 3+ vw right          Subjective history, physical examination performed, diagnostic imaging reviewed at today's visit  Diagnosis was discussed, bilateral thumb CMC arthritis.   Treatment options were discussed which included nonoperative and operative treatment.    Risks, benefits, and realistic expectations for treatment options were discussed.    The patient was given an opportunity to ask questions.  Questions were answered to the patient's satisfaction.    Through shared decision making, the patient decided to move forward with bracing in the form of a comfort cool brace, Velpeau splint off [employer] or a custom splint that can be made by OT. We discussed topical creams such as Arinca can also be beneficial as well as warm moist heat. We discussed the possibility of thumb CMC CSI's in the future vs surgical intervention in the form of a suturesuspensionplasty which is an approx. 3 month recovery.  He was fit with a comfort cool brace in the office today and a OT script was provided.   Follow up in 4-6 weeks time, at which time a CMC CSI may be performed if warranted.           cc: my hands hurt     Subjective:   Rohan Bledsoe is a left hand dominant 68 y.o. male who presents to the office for bilateral hand pain. I am seeing Dom in consultation at the request of Dr. Rajinder Valles. He feels his left hand is worse then his right. Pain will worsen with pinching or grasping activities. Pain is located to the base of both thumbs and has been ongoing for approx. 3 months. He did take Meloxicam for approx. 1.5 weeks without significant improvement in his symptoms and it did cause some stomach upset.   Occupation: [employer]       Review of  Systems   Constitutional:  Negative for chills, fever and unexpected weight change.   HENT:  Negative for hearing loss, nosebleeds and sore throat.    Eyes:  Negative for pain, redness and visual disturbance.   Respiratory:  Negative for cough, shortness of breath and wheezing.    Cardiovascular:  Negative for chest pain, palpitations and leg swelling.   Gastrointestinal:  Negative for abdominal pain, nausea and vomiting.   Endocrine: Negative for polydipsia and polyuria.   Genitourinary:  Negative for difficulty urinating and hematuria.   Musculoskeletal:  Positive for arthralgias. Negative for joint swelling and myalgias.   Skin:  Negative for rash and wound.   Neurological:  Negative for dizziness, numbness and headaches.   Psychiatric/Behavioral:  Negative for decreased concentration, dysphoric mood and suicidal ideas. The patient is not nervous/anxious.          Past Medical History:   Diagnosis Date    Cardiomyopathy (HCC)     Gout     Hyperlipidemia     Hypertension     Inguinal hernia, right     Irregular heartbeat     Kidney stone     Kidney stones     Mediastinal mass     Anterior     Palpitations     Vertigo        Past Surgical History:   Procedure Laterality Date    HERNIA REPAIR      at age 1    [place] LAPAROSCOPY SURG RPR INITIAL INGUINAL HERNIA Bilateral 5/13/2021    Procedure: REPAIR HERNIA INGUINAL, LAPAROSCOPIC with mesh;  Surgeon: Andrea Chavez MD;  Location: [facility];  Service: General       Family History   Problem Relation Age of Onset    Arthritis Mother     Hypertension Mother     Heart disease Mother         CHF    Gout Father     Cancer Father     Gout Family     Hypertension Brother     Gout Brother     Hypertension Brother     Gout Brother     Hypertension Brother     Hypertension Brother     Scoliosis Brother     Substance Abuse Neg Hx     Alcohol abuse Neg Hx     Mental illness Neg Hx        Social History     Occupational History    Occupation: fulltime   Tobacco Use    Smoking  status: Never    Smokeless tobacco: Never    Tobacco comments:     Former   Vaping Use    Vaping status: Never Used   Substance and Sexual Activity    Alcohol use: Yes    Drug use: No    Sexual activity: Yes     Partners: Female         Current Outpatient Medications:     allopurinol (ZYLOPRIM) 300 mg tablet, TAKE ONE TABLET BY MOUTH EVERY DAY, Disp: 90 tablet, Rfl: 0    aspirin 325 mg tablet, Take 325 mg by mouth as needed for mild pain, Disp: , Rfl:     B Complex Vitamins (VITAMIN B COMPLEX PO), Take by mouth daily, Disp: , Rfl:     meloxicam (MOBIC) 15 mg tablet, Take 1 tablet (15 mg total) by mouth daily as needed for moderate pain, Disp: 30 tablet, Rfl: 3    metoprolol succinate (TOPROL-XL) 50 mg 24 hr tablet, Take 1 tablet (50 mg total) bymouth daily, Disp: 90 tablet, Rfl: 1    spironolactone (ALDACTONE) 25 mg tablet, TAKE ONE TABLET BY MOUTH EVERY DAY, Disp: 90 tablet, Rfl: 0    valsartan-hydrochlorothiazide (DIOVAN-HCT) 80-12.5 MG per tablet, Take 1 tablet by mouth daily, Disp: 100 tablet, Rfl: 1    Allergies   Allergen Reactions    Lisinopril Swelling    Shellfish Allergy - Food Allergy Other (See Comments)     Pt states it causes gout        Objective:  Vitals:    08/09/24 0823   BP: 120/66   Pulse: 58       The patient was awake, alert, and oriented to person, place, and time.  No acute distress.  Normocephalic.  EOMI.  Mucous membranes moist.  Normal respiratory effort.    Examination of the affected extremity was compared to the unaffected extremity.  Skin was intact.  No swelling or ecchymosis.  No deformity.  Hand and fingers were warm and well-perfused.  Capillary refill was brisk.  Full active range of motion of the elbows, forearms, wrists, and fingers.  5/5 elbow flexors/extensors, wrist flexor/extensors, and .     Bilateral hands: full digital ROM, + CMC grind bilaterally, CMC joint tenderness bilaterally, no A1 pulley tenderness bilaterally, thumb metacarpal abduction with compensatory MP  hyperextension on the right, mild tenderness at the tendons of the first dorsal compartment bilaterally     Imaging/Diagnostic Studies:    I reviewed imaging studies dated 8/9/24 which included multiple bilateral hand views.  These images studies demonstrated bilateral thumb CMC arthritis.  Right is more advanced than left.  STT joints preserved.       This document was created using speech voice recognition software.   Grammatical errors, random word insertions, pronoun errors, and incomplete sentences are an occasional consequence of this system due to software limitations, ambient noise, and hardware issues.   Any formal questions or concerns about content, text, or information contained within the body of this dictation should be directly addressed to the provider for clarification.    Scribe Attestation      I,:  Lisa Alberts MA am acting as a scribe while in the presence of the attending physician.:       I,:  Radha Donaldson MD personally performed the services described in this documentation    as scribed in my presence.:

## 2024-08-09 NOTE — PATIENT INSTRUCTIONS
Velpeau Thumb Support Brace - CMC Joint Stabilizer Orthosis, Spica Splint for Osteoarthritis, Instability, Tendonitis, Arthritis Pain Relief for Women Men, Comfortable, Adjustable     Link:  [employer]: Velpeau Thumb Support Brace - CMC Joint Stabilizer Orthosis, Spica Splint for Osteoarthritis, Instability, Tendonitis, Arthritis Pain Relief for Women Men, Comfortable, Adjustable (Left Hand-Medium) : [employer]

## 2024-08-16 ENCOUNTER — EVALUATION (OUTPATIENT)
Dept: OCCUPATIONAL THERAPY | Facility: CLINIC | Age: 69
End: 2024-08-16
Payer: COMMERCIAL

## 2024-08-16 DIAGNOSIS — M18.0 PRIMARY OSTEOARTHRITIS OF BOTH FIRST CARPOMETACARPAL JOINTS: ICD-10-CM

## 2024-08-16 PROCEDURE — L3913 HFO W/O JOINTS CF: HCPCS | Performed by: OCCUPATIONAL THERAPIST

## 2024-08-16 NOTE — PROGRESS NOTES
Splint    Diagnosis:   1. Primary osteoarthritis of both first carpometacarpal joints  Ambulatory Referral to PT/OT Hand Therapy         Indication: joint protection    Location: Left  thumb  Supplies: Orthotics  Thermoplastic material    Splint type: HB thumb spica  Wearing Schedule: Wear with activities and rest.  Describe Position: Thumb in functional abduction.    Precautions: Standard    Patient or Caregiver expresses understanding of wearing Schedule and Precautions? Yes  Patient or Caregiver able to don/doff orthotic independently?Yes    Written orders provided to patient? Yes  Orders Obtained: Written  Orders Obtained from: Dr. Donaldson     Pt. Was given and HEP, Pt. Has decided to proceed with surgery.  Pt. Will return to department PRN for R hand splint if needed.

## 2024-08-22 ENCOUNTER — OFFICE VISIT (OUTPATIENT)
Dept: CARDIOLOGY CLINIC | Facility: CLINIC | Age: 69
End: 2024-08-22
Payer: COMMERCIAL

## 2024-08-22 VITALS
BODY MASS INDEX: 26.99 KG/M2 | SYSTOLIC BLOOD PRESSURE: 122 MMHG | WEIGHT: 162 LBS | HEART RATE: 55 BPM | DIASTOLIC BLOOD PRESSURE: 50 MMHG | HEIGHT: 65 IN

## 2024-08-22 DIAGNOSIS — I49.3 FREQUENT PVCS: Primary | ICD-10-CM

## 2024-08-22 DIAGNOSIS — I42.8 NONISCHEMIC CARDIOMYOPATHY (HCC): ICD-10-CM

## 2024-08-22 PROCEDURE — 99214 OFFICE O/P EST MOD 30 MIN: CPT | Performed by: INTERNAL MEDICINE

## 2024-08-22 RX ORDER — SPIRONOLACTONE 25 MG/1
25 TABLET ORAL DAILY
Qty: 90 TABLET | Refills: 3 | Status: SHIPPED | OUTPATIENT
Start: 2024-08-22

## 2024-08-22 NOTE — PROGRESS NOTES
Cardiology Follow Up    Rohan Bledsoe  1955  [id number]  [facility] [address] [id number] [phone]    1. Frequent PVCs  POCT ECG          Interval History: Followup cardiomyopathy HTN PVCs    Overall doing well. No chest pain or dyspnea.     Medical Problems       Problem List       Arthritis    Benign hypertension    GERD without esophagitis    Gout    Hyperlipidemia    Other restrictive cardiomyopathy (HCC)    Mediastinal mass    JOSE ELIAS (obstructive sleep apnea)    Need for pneumococcal vaccination    Status post laparoscopic hernia repair    Other hemorrhoids    COVID-19 virus infection    Epididymitis    Ingrown toenail    Tendonitis    Primary osteoarthritis of both first carpometacarpal joints        Past Medical History:   Diagnosis Date    Cardiomyopathy (HCC)     Gout     Hyperlipidemia     Hypertension     Inguinal hernia, right     Irregular heartbeat     Kidney stone     Kidney stones     Mediastinal mass     Anterior     Palpitations     Vertigo      Social History     Socioeconomic History    Marital status: /Civil Union     Spouse name: Not on file    Number of children: Not on file    Years of education: Not on file    Highest education level: Not on file   Occupational History    Occupation: fulltime   Tobacco Use    Smoking status: Never    Smokeless tobacco: Never    Tobacco comments:     Former   Vaping Use    Vaping status: Never Used   Substance and Sexual Activity    Alcohol use: Yes    Drug use: No    Sexual activity: Yes     Partners: Female   Other Topics Concern    Not on file   Social History Narrative    Not on file     Social Determinants of Health     Financial Resource Strain: Not on file   Food Insecurity: Not on file   Transportation Needs: Not on file   Physical Activity: Not on file   Stress: Not on file   Social Connections: Not on file   Intimate  Partner Violence: Not on file   Housing Stability: Not on file      Family History   Problem Relation Age of Onset    Arthritis Mother     Hypertension Mother     Heart disease Mother         CHF    Gout Father     Cancer Father     Gout Family     Hypertension Brother     Gout Brother     Hypertension Brother     Gout Brother     Hypertension Brother     Hypertension Brother     Scoliosis Brother     Substance Abuse Neg Hx     Alcohol abuse Neg Hx     Mental illness Neg Hx      Past Surgical History:   Procedure Laterality Date    HERNIA REPAIR      at age 1    [facility] LAPAROSCOPY SURG RPR INITIAL INGUINAL HERNIA Bilateral 5/13/2021    Procedure: REPAIR HERNIA INGUINAL, LAPAROSCOPIC with mesh;  Surgeon: Andrea Chavez MD;  Location: [facility];  Service: General       Current Outpatient Medications:     allopurinol (ZYLOPRIM) 300 mg tablet, TAKE ONE TABLET BY MOUTH EVERY DAY, Disp: 90 tablet, Rfl: 0    aspirin 325 mg tablet, Take 325 mg by mouth as needed for mild pain, Disp: , Rfl:     B Complex Vitamins (VITAMIN B COMPLEX PO), Take by mouth daily, Disp: , Rfl:     metoprolol succinate (TOPROL-XL) 50 mg 24 hr tablet, Take 1 tablet (50 mg total) bymouth daily, Disp: 90 tablet, Rfl: 1    spironolactone (ALDACTONE) 25 mg tablet, TAKE ONE TABLET BY MOUTH EVERY DAY, Disp: 90 tablet, Rfl: 0    valsartan-hydrochlorothiazide (DIOVAN-HCT) 80-12.5 MG per tablet, Take 1 tablet by mouth daily, Disp: 100 tablet, Rfl: 1  Allergies   Allergen Reactions    Lisinopril Swelling    Shellfish Allergy - Food Allergy Other (See Comments)     Pt states it causes gout        Labs:     Chemistry        Component Value Date/Time     06/28/2017 0000    K 4.7 09/06/2023 0837    K 5.0 06/28/2017 0000     09/06/2023 0837     06/28/2017 0000    CO2 26 09/06/2023 0837    CO2 20 06/28/2017 0000    BUN 16 09/06/2023 0837    BUN 10 06/28/2017 0000    CREATININE 0.80 09/06/2023 0837    CREATININE 0.76 06/28/2017 0000        Component  "Value Date/Time    CALCIUM 9.1 09/06/2023 0837    CALCIUM 9.3 06/28/2017 0000    ALKPHOS 49 09/06/2023 0837    ALKPHOS 60 06/28/2017 0000    AST 18 09/06/2023 0837    AST 29 06/28/2017 0000    ALT 15 09/06/2023 0837    ALT 31 06/28/2017 0000    BILITOT 0.4 06/28/2017 0000            Lab Results   Component Value Date    CHOL 175 06/28/2017     Lab Results   Component Value Date    HDL 46 06/12/2024    HDL 42 09/06/2023    HDL 36 (L) 08/24/2022     Lab Results   Component Value Date    LDLCALC 81 06/12/2024    LDLCALC 98 09/06/2023    LDLCALC 96 08/24/2022     Lab Results   Component Value Date    TRIG 78 06/12/2024    TRIG 142 09/06/2023    TRIG 143 08/24/2022     No results found for: \"CHOLHDL\"    Imaging: XR hand 3+ vw left    Result Date: 8/9/2024  Narrative: LEFT HAND INDICATION:   Pain in right hand. Pain in left hand. COMPARISON:  None. VIEWS:  XR HAND 3+ VW LEFT Images: 3 For the purposes of institution wide universal language the following terms will apply: (thumb=1st digit/finger, index finger=2nd digit/finger, long finger=3rd digit/finger, ring=4th digit/finger and small finger=5th digit/finger) FINDINGS: There is no acute fracture or dislocation. Severe thumb CMC osteoarthritis with multifocal interphalangeal joint osteoarthritis. No lytic or blastic osseous lesion. Soft tissues are unremarkable.     Impression: No acute osseous abnormality. Degenerative changes as described. Electronically signed: 08/09/2024 11:12 AM Vahid Oliveros MPH,MD    XR hand 3+ vw right    Result Date: 8/9/2024  Narrative: RIGHT HAND INDICATION:   Pain in right hand. Pain in left hand. COMPARISON:  None. VIEWS:  XR HAND 3+ VW RIGHT Images: 4 For the purposes of institution wide universal language the following terms will apply: (thumb=1st digit/finger, index finger=2nd digit/finger, long finger=3rd digit/finger, ring=4th digit/finger and small finger=5th digit/finger) FINDINGS: There is no acute fracture or dislocation. Severe " thumb CMC osteoarthritis. Multifocal interphalangeal joint osteoarthritis. No lytic or blastic osseous lesion. Soft tissues are unremarkable.     Impression: No acute osseous abnormality. Degenerative changes as described. Electronically signed: 08/09/2024 11:11 AM Vahid Oliveros MPH,MD      EKG: SInus Bradycardia with PVCs.    Review of Systems   Constitutional: Negative.   HENT: Negative.     Eyes: Negative.    Cardiovascular: Negative.    Respiratory: Negative.     Endocrine: Negative.    Hematologic/Lymphatic: Negative.    Skin: Negative.    Musculoskeletal: Negative.    Gastrointestinal: Negative.    Genitourinary: Negative.    Neurological: Negative.    Psychiatric/Behavioral: Negative.     Allergic/Immunologic: Negative.        Vitals:    08/22/24 1636   BP: 122/50   Pulse: 55           Physical Exam  Vitals reviewed.   Constitutional:       Appearance: Normal appearance.   HENT:      Head: Normocephalic.      Nose: Nose normal.      Mouth/Throat:      Mouth: Mucous membranes are moist.      Pharynx: Oropharynx is clear.   Eyes:      General: No scleral icterus.     Conjunctiva/sclera: Conjunctivae normal.   Cardiovascular:      Rate and Rhythm: Normal rate and regular rhythm.      Heart sounds: No murmur heard.     No friction rub. No gallop.   Pulmonary:      Effort: Pulmonary effort is normal. No respiratory distress.      Breath sounds: Normal breath sounds. No wheezing or rales.   Abdominal:      General: Abdomen is flat. Bowel sounds are normal. There is no distension.      Palpations: Abdomen is soft.      Tenderness: There is no abdominal tenderness. There is no guarding.   Musculoskeletal:      Cervical back: Normal range of motion and neck supple.      Right lower leg: No edema.      Left lower leg: No edema.   Skin:     General: Skin is warm and dry.   Neurological:      General: No focal deficit present.      Mental Status: He is alert and oriented to person, place, and time.   Psychiatric:          Mood and Affect: Mood normal.         Behavior: Behavior normal.         Discussion/Summary:    1. Nonischemic Cardiomyopathy:  Overall doing well. Cardiac CT and Cardiac MRI are negative. LVEF 45%.  Stable.      2. Frequent PVCs / PACs / PSVT: Continue metoprolol. Minimal symptoms.       3. Hypertension: His BP is under adequate control. No changes today.       The patient was counseled regarding diagnostic results, instructions for management, risk factor reductions, impressions. total time of encounter was 25 minutes and 15 minutes was spent counseling.

## 2024-08-23 PROCEDURE — 93000 ELECTROCARDIOGRAM COMPLETE: CPT | Performed by: INTERNAL MEDICINE

## 2024-09-13 ENCOUNTER — APPOINTMENT (OUTPATIENT)
Dept: LAB | Facility: CLINIC | Age: 69
End: 2024-09-13
Payer: COMMERCIAL

## 2024-09-13 DIAGNOSIS — I42.8 NONISCHEMIC CARDIOMYOPATHY (HCC): ICD-10-CM

## 2024-09-13 LAB
ANION GAP SERPL CALCULATED.3IONS-SCNC: 12 MMOL/L (ref 4–13)
BASOPHILS # BLD AUTO: 0.03 THOUSANDS/ΜL (ref 0–0.1)
BASOPHILS NFR BLD AUTO: 0 % (ref 0–1)
BUN SERPL-MCNC: 18 MG/DL (ref 5–25)
CALCIUM SERPL-MCNC: 9.1 MG/DL (ref 8.4–10.2)
CHLORIDE SERPL-SCNC: 106 MMOL/L (ref 96–108)
CO2 SERPL-SCNC: 22 MMOL/L (ref 21–32)
CREAT SERPL-MCNC: 0.68 MG/DL (ref 0.6–1.3)
EOSINOPHIL # BLD AUTO: 0.06 THOUSAND/ΜL (ref 0–0.61)
EOSINOPHIL NFR BLD AUTO: 1 % (ref 0–6)
ERYTHROCYTE [DISTWIDTH] IN BLOOD BY AUTOMATED COUNT: 14.6 % (ref 11.6–15.1)
GFR SERPL CREATININE-BSD FRML MDRD: 96 ML/MIN/1.73SQ M
GLUCOSE P FAST SERPL-MCNC: 107 MG/DL (ref 65–99)
HCT VFR BLD AUTO: 41.4 % (ref 36.5–49.3)
HGB BLD-MCNC: 14.1 G/DL (ref 12–17)
IMM GRANULOCYTES # BLD AUTO: 0.03 THOUSAND/UL (ref 0–0.2)
IMM GRANULOCYTES NFR BLD AUTO: 0 % (ref 0–2)
LYMPHOCYTES # BLD AUTO: 1.91 THOUSANDS/ΜL (ref 0.6–4.47)
LYMPHOCYTES NFR BLD AUTO: 27 % (ref 14–44)
MCH RBC QN AUTO: 31.3 PG (ref 26.8–34.3)
MCHC RBC AUTO-ENTMCNC: 34.1 G/DL (ref 31.4–37.4)
MCV RBC AUTO: 92 FL (ref 82–98)
MONOCYTES # BLD AUTO: 0.63 THOUSAND/ΜL (ref 0.17–1.22)
MONOCYTES NFR BLD AUTO: 9 % (ref 4–12)
NEUTROPHILS # BLD AUTO: 4.4 THOUSANDS/ΜL (ref 1.85–7.62)
NEUTS SEG NFR BLD AUTO: 63 % (ref 43–75)
NRBC BLD AUTO-RTO: 0 /100 WBCS
PLATELET # BLD AUTO: 358 THOUSANDS/UL (ref 149–390)
PMV BLD AUTO: 10.5 FL (ref 8.9–12.7)
POTASSIUM SERPL-SCNC: 3.8 MMOL/L (ref 3.5–5.3)
RBC # BLD AUTO: 4.51 MILLION/UL (ref 3.88–5.62)
SODIUM SERPL-SCNC: 140 MMOL/L (ref 135–147)
WBC # BLD AUTO: 7.06 THOUSAND/UL (ref 4.31–10.16)

## 2024-09-13 PROCEDURE — 85025 COMPLETE CBC W/AUTO DIFF WBC: CPT

## 2024-09-13 PROCEDURE — 80048 BASIC METABOLIC PNL TOTAL CA: CPT | Performed by: INTERNAL MEDICINE

## 2024-09-13 PROCEDURE — 36415 COLL VENOUS BLD VENIPUNCTURE: CPT

## 2024-09-16 ENCOUNTER — PREP FOR PROCEDURE (OUTPATIENT)
Dept: OBGYN CLINIC | Facility: CLINIC | Age: 69
End: 2024-09-16

## 2024-09-16 ENCOUNTER — OFFICE VISIT (OUTPATIENT)
Dept: OBGYN CLINIC | Facility: CLINIC | Age: 69
End: 2024-09-16
Payer: COMMERCIAL

## 2024-09-16 VITALS
BODY MASS INDEX: 26.66 KG/M2 | WEIGHT: 160 LBS | SYSTOLIC BLOOD PRESSURE: 100 MMHG | DIASTOLIC BLOOD PRESSURE: 59 MMHG | HEIGHT: 65 IN

## 2024-09-16 DIAGNOSIS — M18.0 PRIMARY OSTEOARTHRITIS OF BOTH FIRST CARPOMETACARPAL JOINTS: Primary | ICD-10-CM

## 2024-09-16 PROCEDURE — 99214 OFFICE O/P EST MOD 30 MIN: CPT | Performed by: ORTHOPAEDIC SURGERY

## 2024-09-16 NOTE — PROGRESS NOTES
Assessment/Plan:  1. Primary osteoarthritis of both first carpometacarpal joints  Case request operating room: ARTHROPLASTY THUMB WEILBY    Case request operating room: ARTHROPLASTY THUMB WEILBY          Subjective history, physical examination performed, diagnostic imaging reviewed at today's visit  Diagnosis was discussed  Treatment options were discussed which included nonoperative and operative treatment.    Risks, benefits, and realistic expectations for treatment options were discussed.    The patient was given an opportunity to ask questions.  Questions were answered to the patient's satisfaction.    Through shared decision making, the patient decided to move forward with surgical intervention.  We discussed the suture suspension arthroplasty in detail.  Surgery will be performed under general anesthesia.  We discussed the postoperative recovery.  And the importance of compliance with the postoperative protocol.  He understood that he will have limited use of the operative hand for 8-12 weeks.  Consent will be signed on the day of surgery.          cc: my hands hurt    Subjective:   Rohan Bledsoe is a left hand dominant 68 y.o. male who presents for follow up for left thumb CMC arthritis. He has continued pain in the CMC of the left thumb. He has been wearing his comfort cool brace. He is here to discuss surgical intervention for his arthritis.      Review of Systems   Constitutional:  Negative for chills and fever.   HENT:  Negative for ear pain and sore throat.    Eyes:  Negative for pain and visual disturbance.   Respiratory:  Negative for cough and shortness of breath.    Cardiovascular:  Negative for chest pain and palpitations.   Gastrointestinal:  Negative for abdominal pain and vomiting.   Genitourinary:  Negative for dysuria and hematuria.   Musculoskeletal:  Negative for arthralgias and back pain.   Skin:  Negative for color change and rash.   Neurological:  Negative for seizures and syncope.    All other systems reviewed and are negative.        Past Medical History:   Diagnosis Date    Cardiomyopathy (HCC)     Gout     Hyperlipidemia     Hypertension     Inguinal hernia, right     Irregular heartbeat     Kidney stone     Kidney stones     Mediastinal mass     Anterior     Palpitations     Vertigo        Past Surgical History:   Procedure Laterality Date    HERNIA REPAIR      at age 1    [place] LAPAROSCOPY SURG RPR INITIAL INGUINAL HERNIA Bilateral 5/13/2021    Procedure: REPAIR HERNIA INGUINAL, LAPAROSCOPIC with mesh;  Surgeon: Andrea Chavez MD;  Location: [facility];  Service: General       Family History   Problem Relation Age of Onset    Arthritis Mother     Hypertension Mother     Heart disease Mother         CHF    Gout Father     Cancer Father     Gout Family     Hypertension Brother     Gout Brother     Hypertension Brother     Gout Brother     Hypertension Brother     Hypertension Brother     Scoliosis Brother     Substance Abuse Neg Hx     Alcohol abuse Neg Hx     Mental illness Neg Hx        Social History     Occupational History    Occupation: fulltime   Tobacco Use    Smoking status: Never    Smokeless tobacco: Never    Tobacco comments:     Former   Vaping Use    Vaping status: Never Used   Substance and Sexual Activity    Alcohol use: Yes    Drug use: No    Sexual activity: Yes     Partners: Female         Current Outpatient Medications:     allopurinol (ZYLOPRIM) 300 mg tablet, TAKE ONE TABLET BY MOUTH EVERY DAY, Disp: 90 tablet, Rfl: 0    aspirin 325 mg tablet, Take 325 mg by mouth as needed for mild pain, Disp: , Rfl:     B Complex Vitamins (VITAMIN B COMPLEX PO), Take by mouth daily, Disp: , Rfl:     metoprolol succinate (TOPROL-XL) 50 mg 24 hr tablet, Take 1 tablet (50 mg total) bymouth daily, Disp: 90 tablet, Rfl: 1    spironolactone (ALDACTONE) 25 mg tablet, Take 1 tablet (25 mg total) by mouth daily, Disp: 90 tablet, Rfl: 3    valsartan-hydrochlorothiazide (DIOVAN-HCT) 80-12.5 MG  per tablet, Take 1 tablet by mouth daily, Disp: 100 tablet, Rfl: 1    Allergies   Allergen Reactions    Lisinopril Swelling    Shellfish Allergy - Food Allergy Other (See Comments)     Pt states it causes gout        Objective:  Vitals:    09/16/24 0834   BP: 100/59       The patient was awake, alert, and oriented to person, place, and time.  No acute distress.  Normocephalic.  EOMI.  Mucous membranes moist.  Normal respiratory effort.    Examination of the affected extremity was compared to the unaffected extremity.  Skin was intact.  No swelling or ecchymosis.  No deformity.  Hand and fingers were warm and well-perfused.  Capillary refill was brisk.  Full active range of motion of the elbows, forearms, wrists, and fingers.  5/5 elbow flexors/extensors, wrist flexor/extensors, and .     Bilateral hands: full digital ROM, + CMC grind bilaterally, CMC joint tenderness bilaterally, no A1 pulley tenderness bilaterally, thumb metacarpal abduction with compensatory MP hyperextension on the right, mild tenderness at the tendons of the first dorsal compartment bilaterally    Imaging/Diagnostic Studies:    I reviewed the x-rays of the left hand dated 8/9/2024.  These x-rays demonstrated degenerative changes in the left thumb CMC joint.  The STT joint was preserved.        This document was created using speech voice recognition software.   Grammatical errors, random word insertions, pronoun errors, and incomplete sentences are an occasional consequence of this system due to software limitations, ambient noise, and hardware issues.   Any formal questions or concerns about content, text, or information contained within the body of this dictation should be directly addressed to the provider for clarification.     Scribe Attestation      I,:  Domo Liang am acting as a scribe while in the presence of the attending physician.:       I,:  Radha Donaldson MD personally performed the services described in this  documentation    as scribed in my presence.:

## 2024-09-19 ENCOUNTER — HOSPITAL ENCOUNTER (OUTPATIENT)
Dept: NON INVASIVE DIAGNOSTICS | Facility: HOSPITAL | Age: 69
Discharge: HOME | End: 2024-09-19
Attending: INTERNAL MEDICINE
Payer: COMMERCIAL

## 2024-09-19 VITALS
BODY MASS INDEX: 26.66 KG/M2 | HEIGHT: 65 IN | HEART RATE: 57 BPM | SYSTOLIC BLOOD PRESSURE: 100 MMHG | DIASTOLIC BLOOD PRESSURE: 59 MMHG | WEIGHT: 160 LBS

## 2024-09-19 DIAGNOSIS — I42.8 NONISCHEMIC CARDIOMYOPATHY (HCC): ICD-10-CM

## 2024-09-19 LAB
AORTIC ROOT: 3 CM
APICAL FOUR CHAMBER EJECTION FRACTION: 46 %
ASCENDING AORTA: 3.4 CM
BSA FOR ECHO PROCEDURE: 1.8 M2
E WAVE DECELERATION TIME: 199 MS
E/A RATIO: 0.94
FRACTIONAL SHORTENING: 21 (ref 28–44)
INTERVENTRICULAR SEPTUM IN DIASTOLE (PARASTERNAL SHORT AXIS VIEW): 0.8 CM
INTERVENTRICULAR SEPTUM: 0.8 CM (ref 0.6–1.1)
LAAS-AP2: 24.4 CM2
LAAS-AP4: 21.9 CM2
LEFT ATRIUM SIZE: 4.1 CM
LEFT ATRIUM VOLUME (MOD BIPLANE): 73 ML
LEFT ATRIUM VOLUME INDEX (MOD BIPLANE): 40.6 ML/M2
LEFT INTERNAL DIMENSION IN SYSTOLE: 4.1 CM (ref 2.1–4)
LEFT VENTRICULAR INTERNAL DIMENSION IN DIASTOLE: 5.2 CM (ref 3.5–6)
LEFT VENTRICULAR POSTERIOR WALL IN END DIASTOLE: 1 CM
LEFT VENTRICULAR STROKE VOLUME: 53 ML
LVSV (TEICH): 53 ML
MV E'TISSUE VEL-SEP: 8 CM/S
MV PEAK A VEL: 0.82 M/S
MV PEAK E VEL: 77 CM/S
MV STENOSIS PRESSURE HALF TIME: 58 MS
MV VALVE AREA P 1/2 METHOD: 3.79
RIGHT ATRIAL 2D VOLUME: 59 ML
RIGHT ATRIUM AREA SYSTOLE A4C: 19.5 CM2
RIGHT VENTRICLE ID DIMENSION: 3.4 CM
SL CV LEFT ATRIUM LENGTH A2C: 5.9 CM
SL CV LV EF: 45
SL CV PED ECHO LEFT VENTRICLE DIASTOLIC VOLUME (MOD BIPLANE) 2D: 127 ML
SL CV PED ECHO LEFT VENTRICLE SYSTOLIC VOLUME (MOD BIPLANE) 2D: 74 ML
TR MAX PG: 19 MMHG
TR PEAK VELOCITY: 2.2 M/S
TRICUSPID ANNULAR PLANE SYSTOLIC EXCURSION: 2.3 CM
TRICUSPID VALVE PEAK REGURGITATION VELOCITY: 2.18 M/S

## 2024-09-19 PROCEDURE — 93306 TTE W/DOPPLER COMPLETE: CPT | Performed by: INTERNAL MEDICINE

## 2024-09-19 PROCEDURE — 93306 TTE W/DOPPLER COMPLETE: CPT

## 2024-10-09 DIAGNOSIS — M10.9 GOUT, UNSPECIFIED CAUSE, UNSPECIFIED CHRONICITY, UNSPECIFIED SITE: ICD-10-CM

## 2024-10-09 RX ORDER — ALLOPURINOL 300 MG/1
TABLET ORAL
Qty: 90 TABLET | Refills: 1 | Status: SHIPPED | OUTPATIENT
Start: 2024-10-09

## 2024-10-28 ENCOUNTER — TELEPHONE (OUTPATIENT)
Age: 69
End: 2024-10-28

## 2024-10-28 NOTE — TELEPHONE ENCOUNTER
Caller: Chau    Doctor: Anika    Reason for call: Patient called stating he needs to reschedule his surgery that is scheduled for November 25,2024. Please call patient to get surgery rescheduled.     Call back#: [phone]

## 2024-11-18 DIAGNOSIS — I49.3 FREQUENT PVCS: ICD-10-CM

## 2024-11-19 RX ORDER — METOPROLOL SUCCINATE 50 MG/1
50 TABLET, EXTENDED RELEASE ORAL DAILY
Qty: 90 TABLET | Refills: 1 | Status: SHIPPED | OUTPATIENT
Start: 2024-11-19

## 2025-01-13 ENCOUNTER — OFFICE VISIT (OUTPATIENT)
Dept: FAMILY MEDICINE CLINIC | Facility: CLINIC | Age: 70
End: 2025-01-13
Payer: COMMERCIAL

## 2025-01-13 VITALS
WEIGHT: 172 LBS | HEIGHT: 65 IN | BODY MASS INDEX: 28.66 KG/M2 | HEART RATE: 72 BPM | OXYGEN SATURATION: 97 % | SYSTOLIC BLOOD PRESSURE: 140 MMHG | RESPIRATION RATE: 18 BRPM | DIASTOLIC BLOOD PRESSURE: 70 MMHG | TEMPERATURE: 97.6 F

## 2025-01-13 DIAGNOSIS — J01.00 SUBACUTE MAXILLARY SINUSITIS: Primary | ICD-10-CM

## 2025-01-13 PROCEDURE — 99213 OFFICE O/P EST LOW 20 MIN: CPT | Performed by: FAMILY MEDICINE

## 2025-01-13 RX ORDER — CEFUROXIME AXETIL 500 MG/1
500 TABLET ORAL 2 TIMES DAILY
Qty: 20 TABLET | Refills: 0 | Status: SHIPPED | OUTPATIENT
Start: 2025-01-13 | End: 2025-01-23

## 2025-01-13 NOTE — ASSESSMENT & PLAN NOTE
Sinusitis.  Patient given prescription for Ceftin 500 mg to use 1 twice daily for 10 days.  He may use over-the-counter's sinus rinse as needed.  Patient will call if symptoms persist after medication completed.  He may also use over-the-counter saline nasal spray for congestion

## 2025-01-13 NOTE — PROGRESS NOTES
FAMILY PRACTICE OFFICE VISIT       NAME: Rohan Bledsoe  AGE: 69 y.o. SEX: male       : 1955        MRN: [id number]    DATE: 2025  TIME: 8:31 AM    Assessment and Plan     Problem List Items Addressed This Visit       Subacute maxillary sinusitis - Primary    Sinusitis.  Patient given prescription for Ceftin 500 mg to use 1 twice daily for 10 days.  He may use over-the-counter's sinus rinse as needed.  Patient will call if symptoms persist after medication completed.  He may also use over-the-counter saline nasal spray for congestion         Relevant Medications    cefuroxime (CEFTIN) 500 mg tablet           Chief Complaint     Chief Complaint   Patient presents with    Sinusitis     X 3 week        History of Present Illness     Patient in the office with approximately 1 week history of sinus pressure and postnasal drip and occasional rhinorrhea.  He denies any documented fevers.  He has occasional headaches.    Sinusitis  Associated symptoms include headaches and sinus pressure. Pertinent negatives include no coughing.       Review of Systems   Review of Systems   Constitutional:  Positive for fatigue. Negative for fever.   HENT:  Positive for nosebleeds, postnasal drip, rhinorrhea and sinus pressure.    Respiratory:  Negative for cough.    Cardiovascular: Negative.    Gastrointestinal: Negative.    Neurological:  Positive for headaches.       Active Problem List     Patient Active Problem List   Diagnosis    Arthritis    Benign hypertension    GERD without esophagitis    Gout    Hyperlipidemia    Other restrictive cardiomyopathy (HCC)    Mediastinal mass    JOSE ELIAS (obstructive sleep apnea)    Need for pneumococcal vaccination    Status post laparoscopic hernia repair    Other hemorrhoids    COVID-19 virus infection    Epididymitis    Ingrown toenail    Tendonitis    Primary osteoarthritis of both first carpometacarpal joints    Subacute maxillary sinusitis       Past Medical History:  Past  Medical History:   Diagnosis Date    Cardiomyopathy (HCC)     Gout     Hyperlipidemia     Hypertension     Inguinal hernia, right     Irregular heartbeat     Kidney stone     Kidney stones     Mediastinal mass     Anterior     Palpitations     Vertigo        Past Surgical History:  Past Surgical History:   Procedure Laterality Date    HERNIA REPAIR      at age 1    DE LAPAROSCOPY SURG RPR INITIAL INGUINAL HERNIA Bilateral 5/13/2021    Procedure: REPAIR HERNIA INGUINAL, LAPAROSCOPIC with mesh;  Surgeon: Andrea Chavez MD;  Location: [facility];  Service: General       Family History:  Family History   Problem Relation Age of Onset    Arthritis Mother     Hypertension Mother     Heart disease Mother         CHF    Gout Father     Cancer Father     Gout Family     Hypertension Brother     Gout Brother     Hypertension Brother     Gout Brother     Hypertension Brother     Hypertension Brother     Scoliosis Brother     Substance Abuse Neg Hx     Alcohol abuse Neg Hx     Mental illness Neg Hx        Social History:  Social History     Socioeconomic History    Marital status: /Civil Union     Spouse name: Not on file    Number of children: Not on file    Years of education: Not on file    Highest education level: Not on file   Occupational History    Occupation: fulltime   Tobacco Use    Smoking status: Never    Smokeless tobacco: Never    Tobacco comments:     Former   Vaping Use    Vaping status: Never Used   Substance and Sexual Activity    Alcohol use: Yes    Drug use: No    Sexual activity: Yes     Partners: Female   Other Topics Concern    Not on file   Social History Narrative    Not on file     Social Drivers of Health     Financial Resource Strain: Not on file   Food Insecurity: Not on file   Transportation Needs: Not on file   Physical Activity: Not on file   Stress: Not on file   Social Connections: Not on file   Intimate Partner Violence: Not on file   Housing Stability: Not on file       Objective  "    Vitals:    01/13/25 1340   BP: 140/70   Pulse: 72   Resp: 18   Temp: 97.6 °F (36.4 °C)   SpO2: 97%     Wt Readings from Last 3 Encounters:   01/13/25 78 kg (172 lb)   09/19/24 72.6 kg (160 lb)   09/16/24 72.6 kg (160 lb)       Physical Exam  Constitutional:       Appearance: Normal appearance.   HENT:      Right Ear: Tympanic membrane, ear canal and external ear normal. There is no impacted cerumen.      Left Ear: Tympanic membrane, ear canal and external ear normal. There is no impacted cerumen.      Nose: Nose normal. No congestion or rhinorrhea.      Mouth/Throat:      Mouth: Mucous membranes are moist.      Pharynx: No oropharyngeal exudate or posterior oropharyngeal erythema.   Eyes:      General:         Right eye: No discharge.         Left eye: No discharge.      Extraocular Movements: Extraocular movements intact.      Conjunctiva/sclera: Conjunctivae normal.      Pupils: Pupils are equal, round, and reactive to light.   Cardiovascular:      Rate and Rhythm: Normal rate and regular rhythm.      Heart sounds: Normal heart sounds. No murmur heard.  Pulmonary:      Effort: Pulmonary effort is normal. No respiratory distress.      Breath sounds: Normal breath sounds. No wheezing, rhonchi or rales.   Lymphadenopathy:      Cervical: No cervical adenopathy.   Neurological:      Mental Status: He is alert.         Pertinent Laboratory/Diagnostic Studies:  Lab Results   Component Value Date    GLUCOSE 99 06/28/2017    BUN 18 09/13/2024    CREATININE 0.68 09/13/2024    CALCIUM 9.1 09/13/2024     06/28/2017    K 3.8 09/13/2024    CO2 22 09/13/2024     09/13/2024     Lab Results   Component Value Date    ALT 15 09/06/2023    AST 18 09/06/2023    ALKPHOS 49 09/06/2023    BILITOT 0.4 06/28/2017       Lab Results   Component Value Date    WBC 7.06 09/13/2024    HGB 14.1 09/13/2024    HCT 41.4 09/13/2024    MCV 92 09/13/2024     09/13/2024       No results found for: \"TSH\"    Lab Results   Component " Value Date    CHOL 175 06/28/2017     Lab Results   Component Value Date    TRIG 78 06/12/2024     Lab Results   Component Value Date    HDL 46 06/12/2024     Lab Results   Component Value Date    LDLCALC 81 06/12/2024     Lab Results   Component Value Date    HGBA1C 5.4 06/12/2024       Results for orders placed or performed during the hospital encounter of 09/19/24   Echo complete w/ contrast if indicated    Collection Time: 09/19/24  9:38 AM   Result Value Ref Range    BSA 1.8 m2    A4C EF 46 %    LVIDd 5.20 cm    LVIDS 4.10 cm    IVSd 0.80 cm    LVPWd 1.00 cm    FS 21 28 - 44    MV E' Tissue Velocity Septal 8 cm/s    LA Volume Index (BP) 40.6 mL/m2    E/A ratio 0.94     E wave deceleration time 199 ms    MV Peak E Guido 77 cm/s    MV Peak A Guido 0.82 m/s    RVID d 3.4 cm    Tricuspid annular plane systolic excursion 2.30 cm    LA size 4.1 cm    LA length (A2C) 5.90 cm    LA volume (BP) 73 mL    RA 2D Volume 59.0 mL    RAA A4C 19.5 cm2    MV stenosis pressure 1/2 time 58 ms    MV valve area p 1/2 method 3.79     TR Peak Guido 2.2 m/s    Triscuspid Valve Regurgitation Peak Gradient 19.0 mmHg    Ao root 3.00 cm    Asc Ao 3.4 cm    Tricuspid valve peak regurgitation velocity 2.18 m/s    Left ventricular stroke volume (2D) 53.00 mL    IVS 0.8 cm    LEFT VENTRICLE SYSTOLIC VOLUME (MOD BIPLANE) 2D 74 mL    LV DIASTOLIC VOLUME (MOD BIPLANE) 2D 127 mL    Left Atrium Area-systolic Four Chamber 21.9 cm2    Left Atrium Area-systolic Apical Two Chamber 24.4 cm2    LVSV, 2D 53 mL    LV EF 45        No orders of the defined types were placed in this encounter.      ALLERGIES:  Allergies   Allergen Reactions    Lisinopril Swelling    Shellfish Allergy - Food Allergy Other (See Comments)     Pt states it causes gout        Current Medications     Current Outpatient Medications   Medication Sig Dispense Refill    allopurinol (ZYLOPRIM) 300 mg tablet TAKE ONE TABLET BY MOUTH EVERY DAY 90 tablet 1    aspirin 325 mg tablet Take 325 mg by  mouth as needed for mild pain      B Complex Vitamins (VITAMIN B COMPLEX PO) Take by mouth daily      cefuroxime (CEFTIN) 500 mg tablet Take 1 tablet (500 mg total) by mouth 2 (two) times a day for 10 days 20 tablet 0    metoprolol succinate (TOPROL-XL) 50 mg 24 hr tablet Take 1 tablet (50 mg total) by mouth daily 90 tablet 1    spironolactone (ALDACTONE) 25 mg tablet Take 1 tablet (25 mg total) by mouth daily 90 tablet 3    valsartan-hydrochlorothiazide (DIOVAN-HCT) 80-12.5 MG per tablet Take 1 tablet by mouth daily 100 tablet 1     No current facility-administered medications for this visit.         Health Maintenance     Health Maintenance   Topic Date Due    OT PLAN OF CARE  Never done    RSV Vaccine Age 60+ Years (1 - Risk 60-74 years 1-dose series) Never done    Pneumococcal Vaccine: 65+ Years (2 of 2 - PCV) 11/03/2021    Annual Physical  08/28/2024    Influenza Vaccine (1) 09/01/2024    COVID-19 Vaccine (5 - 2024-25 season) 09/01/2024    Fall Risk  08/16/2025    Depression Screening  01/13/2026    Colorectal Cancer Screening  05/09/2027    DTaP,Tdap,and Td Vaccines (2 - Td or Tdap) 11/27/2028    Hepatitis C Screening  Completed    Zoster Vaccine  Completed    Meningococcal B Vaccine  Aged Out    RSV Vaccine age 0-20 Months  Aged Out    HIB Vaccine  Aged Out    IPV Vaccine  Aged Out    Hepatitis A Vaccine  Aged Out    Meningococcal ACWY Vaccine  Aged Out    HPV Vaccine  Aged Out     Immunization History   Administered Date(s) Administered    COVID-19 MODERNA VACC 0.25 ML IM BOOSTER 12/07/2021    COVID-19 MODERNA VACC 0.5 ML IM 12/30/2020, 01/28/2021, 12/07/2021    INFLUENZA 11/18/2019, 10/20/2020    Pneumococcal Polysaccharide PPV23 11/03/2020    Tdap 11/27/2018    Zoster Vaccine Recombinant 05/06/2019, 07/09/2019       Rajinder Valles MD

## 2025-02-07 NOTE — PRE-PROCEDURE INSTRUCTIONS
Pre-Surgery Instructions:   Medication Instructions    allopurinol (ZYLOPRIM) 300 mg tablet Take day of surgery.    aspirin 325 mg tablet Stop taking 7 days prior to surgery.    B Complex Vitamins (VITAMIN B COMPLEX PO) Stop taking 7 days prior to surgery.    metoprolol succinate (TOPROL-XL) 50 mg 24 hr tablet Take day of surgery.    spironolactone (ALDACTONE) 25 mg tablet Hold day of surgery.    valsartan-hydrochlorothiazide (DIOVAN-HCT) 80-12.5 MG per tablet Hold day of surgery.     Medication instructions for day surgery reviewed. Please use only a sip of water to take your instructed medications. Avoid all over the counter vitamins, supplements and NSAIDS for one week prior to surgery per anesthesia guidelines. Tylenol is ok to take as needed.     You will receive a call one business day prior to surgery with an arrival time and hospital directions. If your surgery is scheduled on a Monday, the hospital will be calling you on the Friday prior to your surgery. If you have not heard from anyone by 8pm, please call the hospital supervisor through the hospital  at [phone]. ([place] [phone] or [place] [phone]).    Do not eat or drink anything after midnight the night before your surgery, including candy, mints, lifesavers, or chewing gum. Do not drink alcohol 24hrs before your surgery. Try not to smoke at least 24hrs before your surgery.       Follow the pre surgery showering instructions as listed in the “My Surgical Experience Booklet” or otherwise provided by your surgeon's office. Do not use a blade to shave the surgical area 1 week before surgery. It is okay to use a clean electric clippers up to 24 hours before surgery. Do not apply any lotions, creams, including makeup, cologne, deodorant, or perfumes after showering on the day of your surgery. Do not use dry shampoo, hair spray, hair gel, or any type of hair products.     No contact lenses, eye make-up, or artificial eyelashes. Remove  nail polish, including gel polish, and any artificial, gel, or acrylic nails if possible. Remove all jewelry including rings and body piercing jewelry.     Wear causal clothing that is easy to take on and off. Consider your type of surgery.    Keep any valuables, jewelry, piercings at home. Please bring any specially ordered equipment (sling, braces) if indicated.    Arrange for a responsible person to drive you to and from the hospital on the day of your surgery. Please confirm the visitor policy for the day of your procedure when you receive your phone call with an arrival time.     Call the surgeon's office with any new illnesses, exposures, or additional questions prior to surgery.    Please reference your “My Surgical Experience Booklet” for additional information to prepare for your upcoming surgery.

## 2025-02-12 ENCOUNTER — TELEPHONE (OUTPATIENT)
Age: 70
End: 2025-02-12

## 2025-02-12 PROBLEM — J01.00 SUBACUTE MAXILLARY SINUSITIS: Status: RESOLVED | Noted: 2025-01-13 | Resolved: 2025-02-12

## 2025-02-12 NOTE — TELEPHONE ENCOUNTER
Patient states he received the link for the PT IQ and when he went to click the link to fill it out it states he has the wrong . Confirmed patients correct YOB: 1955. Sent [employer] message to [facility] Patient IQ.

## 2025-02-13 NOTE — TELEPHONE ENCOUNTER
I called and spoke to patient and let him know that his  appears to be correct in Patient IQ.  I let him know that I would reach out to the surgery schedulers to review what he may need to do to make sure he is ready for surgery.

## 2025-02-17 ENCOUNTER — ANESTHESIA EVENT (OUTPATIENT)
Dept: PERIOP | Facility: HOSPITAL | Age: 70
End: 2025-02-17
Payer: COMMERCIAL

## 2025-02-17 NOTE — DISCHARGE INSTR - AVS FIRST PAGE
Post Operative Instructions    You have had surgery on your arm today, please read and follow the information below:  Elevate your hand above your elbow during the next 24-48 hours to help with swelling.  Place your hand and arm over your head with motion at your shoulder three times a day.  Do not apply any cream/ointment/oil to your incisions including antibiotics.  Do not soak your hands in standing water (dishwater, tubs, Jacuzzi's, pools, etc.) until given permission (typically 2-3 weeks after injury)    Call the office if you notice any:  Increased numbness or tingling of your hand or fingers that is not relieved with elevation.  Increasing pain that is not controlled with medication.  Difficulty chewing, breathing, swallowing.  Chest pains or shortness of breath.  Fever over 101.4 degrees.    Bandage: Do NOT remove bandage until follow-up appointment.  The hand therapist will remove the bandage and fabricate a splint for you to wear at all times.    Motion: Move fingers into a fist 5 times a day, DO NOT move any splinted fingers.    Weight bearing status: The operated extremity should be non-weight bearing until further notice. No gripping, grasping, pinching, pulling, pushing with the operative hand until you are cleared by Dr. Donaldson    Ice: Ice for 10 minutes every hour as needed for swelling x 24 hours.    Sling: No sling necessary.    Medications:   FIRST 5 DAYS:  Toradol and Tylenol.  Toradol will be taken every 6 hours.  Tylenol will be taken every 6 hours.  Timing is as follows:  Take Tylenol, then 3 hours later take Toradol, then 3 hours later take Tylenol, then 3 hours later take Toradol.  Every 3 hours, you will take either Tylenol or Toradol.  AFTER THE FIRST 5 DAYS:  No more Toradol can be prescribed.  You will then take Tylenol and Ibuprofen- You may take over-the-counter Tylenol and Ibuprofen.  Tylenol dosing can be either Extra Strength (500 mg) or 2 tablets of Regular Strength (650 mg).  Ibuprofen dosing can be up to 3 tablets of 200 mg for a total of 600mg.  Timing is as follows:  Take Tylenol, then 3 hours later take ibuprofen, then 3 hours later take Tylenol, then 3 hours later take ibuprofen.  Every 3 hours, you will take either Tylenol or ibuprofen.  Antibiotics will be prescribed only if indicated.        Follow-up Appointment:   As scheduled with hand therapist and Dr. Donaldson      Please call the office if you have any questions or concerns regarding your post-operative care.

## 2025-02-17 NOTE — TELEPHONE ENCOUNTER
Spoke to patient and confirmed that all tasks in PatientIQ are complete at the moment and that nothing related to PatientIQ would interrupt his surgery scheduled for tomorrow. Patient indicated he understood. Regarding the inability to log in, I believe it is because he completed his pre-op questions in the fall 2024 and he is up to date.There is nothing new to complete.    I assured patient that I see his surgery on the calendar for tomorrow and that he should be receiving a call later this afternoon/early evening with a time to report to the hospital tomorrow. Asked if there was anything else I could do for him and he said no.

## 2025-02-18 ENCOUNTER — HOSPITAL ENCOUNTER (OUTPATIENT)
Facility: HOSPITAL | Age: 70
Setting detail: OUTPATIENT SURGERY
Discharge: HOME | End: 2025-02-18
Attending: ORTHOPAEDIC SURGERY | Admitting: ORTHOPAEDIC SURGERY
Payer: COMMERCIAL

## 2025-02-18 ENCOUNTER — APPOINTMENT (OUTPATIENT)
Dept: RADIOLOGY | Facility: HOSPITAL | Age: 70
End: 2025-02-18
Payer: COMMERCIAL

## 2025-02-18 ENCOUNTER — ANESTHESIA (OUTPATIENT)
Dept: PERIOP | Facility: HOSPITAL | Age: 70
End: 2025-02-18
Payer: COMMERCIAL

## 2025-02-18 VITALS
HEART RATE: 62 BPM | SYSTOLIC BLOOD PRESSURE: 139 MMHG | OXYGEN SATURATION: 96 % | TEMPERATURE: 98.5 F | WEIGHT: 166.8 LBS | BODY MASS INDEX: 27.76 KG/M2 | DIASTOLIC BLOOD PRESSURE: 77 MMHG | RESPIRATION RATE: 20 BRPM

## 2025-02-18 DIAGNOSIS — M18.0 PRIMARY OSTEOARTHRITIS OF BOTH FIRST CARPOMETACARPAL JOINTS: Primary | ICD-10-CM

## 2025-02-18 PROCEDURE — 25447 ARTHRP NTRCRP/CRP/MTCR NTRPS: CPT

## 2025-02-18 PROCEDURE — NC001 PR NO CHARGE: Performed by: ORTHOPAEDIC SURGERY

## 2025-02-18 PROCEDURE — 25447 ARTHRP NTRCRP/CRP/MTCR NTRPS: CPT | Performed by: ORTHOPAEDIC SURGERY

## 2025-02-18 PROCEDURE — 73140 X-RAY EXAM OF FINGER(S): CPT

## 2025-02-18 RX ORDER — BUPIVACAINE HYDROCHLORIDE 5 MG/ML
INJECTION, SOLUTION EPIDURAL; INTRACAUDAL
Status: COMPLETED | OUTPATIENT
Start: 2025-02-18 | End: 2025-02-18

## 2025-02-18 RX ORDER — ONDANSETRON 2 MG/ML
INJECTION INTRAMUSCULAR; INTRAVENOUS AS NEEDED
Status: DISCONTINUED | OUTPATIENT
Start: 2025-02-18 | End: 2025-02-18

## 2025-02-18 RX ORDER — PROPOFOL 10 MG/ML
INJECTION, EMULSION INTRAVENOUS AS NEEDED
Status: DISCONTINUED | OUTPATIENT
Start: 2025-02-18 | End: 2025-02-18

## 2025-02-18 RX ORDER — CEFAZOLIN SODIUM 2 G/50ML
2000 SOLUTION INTRAVENOUS ONCE
Status: COMPLETED | OUTPATIENT
Start: 2025-02-18 | End: 2025-02-18

## 2025-02-18 RX ORDER — ONDANSETRON 2 MG/ML
4 INJECTION INTRAMUSCULAR; INTRAVENOUS ONCE AS NEEDED
Status: DISCONTINUED | OUTPATIENT
Start: 2025-02-18 | End: 2025-02-18 | Stop reason: HOSPADM

## 2025-02-18 RX ORDER — SODIUM CHLORIDE, SODIUM LACTATE, POTASSIUM CHLORIDE, CALCIUM CHLORIDE 600; 310; 30; 20 MG/100ML; MG/100ML; MG/100ML; MG/100ML
125 INJECTION, SOLUTION INTRAVENOUS CONTINUOUS
Status: DISCONTINUED | OUTPATIENT
Start: 2025-02-18 | End: 2025-02-18 | Stop reason: HOSPADM

## 2025-02-18 RX ORDER — EPHEDRINE SULFATE 50 MG/ML
INJECTION INTRAVENOUS AS NEEDED
Status: DISCONTINUED | OUTPATIENT
Start: 2025-02-18 | End: 2025-02-18

## 2025-02-18 RX ORDER — DEXAMETHASONE SODIUM PHOSPHATE 10 MG/ML
INJECTION, SOLUTION INTRAMUSCULAR; INTRAVENOUS AS NEEDED
Status: DISCONTINUED | OUTPATIENT
Start: 2025-02-18 | End: 2025-02-18

## 2025-02-18 RX ORDER — ACETAMINOPHEN 500 MG
500 TABLET ORAL EVERY 6 HOURS PRN
COMMUNITY

## 2025-02-18 RX ORDER — MIDAZOLAM HYDROCHLORIDE 2 MG/2ML
INJECTION, SOLUTION INTRAMUSCULAR; INTRAVENOUS
Status: COMPLETED | OUTPATIENT
Start: 2025-02-18 | End: 2025-02-18

## 2025-02-18 RX ORDER — DEXAMETHASONE SODIUM PHOSPHATE 4 MG/ML
INJECTION, SOLUTION INTRA-ARTICULAR; INTRALESIONAL; INTRAMUSCULAR; INTRAVENOUS; SOFT TISSUE
Status: COMPLETED | OUTPATIENT
Start: 2025-02-18 | End: 2025-02-18

## 2025-02-18 RX ORDER — SODIUM CHLORIDE, SODIUM LACTATE, POTASSIUM CHLORIDE, CALCIUM CHLORIDE 600; 310; 30; 20 MG/100ML; MG/100ML; MG/100ML; MG/100ML
INJECTION, SOLUTION INTRAVENOUS CONTINUOUS PRN
Status: DISCONTINUED | OUTPATIENT
Start: 2025-02-18 | End: 2025-02-18

## 2025-02-18 RX ORDER — METOCLOPRAMIDE HYDROCHLORIDE 5 MG/ML
10 INJECTION INTRAMUSCULAR; INTRAVENOUS ONCE AS NEEDED
Status: DISCONTINUED | OUTPATIENT
Start: 2025-02-18 | End: 2025-02-18 | Stop reason: HOSPADM

## 2025-02-18 RX ORDER — FENTANYL CITRATE/PF 50 MCG/ML
25 SYRINGE (ML) INJECTION
Status: DISCONTINUED | OUTPATIENT
Start: 2025-02-18 | End: 2025-02-18 | Stop reason: HOSPADM

## 2025-02-18 RX ORDER — KETOROLAC TROMETHAMINE 10 MG/1
10 TABLET, FILM COATED ORAL EVERY 6 HOURS PRN
Qty: 20 TABLET | Refills: 0 | Status: SHIPPED | OUTPATIENT
Start: 2025-02-18

## 2025-02-18 RX ADMIN — SODIUM CHLORIDE, SODIUM LACTATE, POTASSIUM CHLORIDE, AND CALCIUM CHLORIDE 125 ML/HR: .6; .31; .03; .02 INJECTION, SOLUTION INTRAVENOUS at 13:22

## 2025-02-18 RX ADMIN — EPHEDRINE SULFATE 5 MG: 50 INJECTION INTRAVENOUS at 14:10

## 2025-02-18 RX ADMIN — CEFAZOLIN SODIUM 2000 MG: 2 SOLUTION INTRAVENOUS at 14:13

## 2025-02-18 RX ADMIN — BUPIVACAINE HYDROCHLORIDE 30 ML: 5 INJECTION, SOLUTION EPIDURAL; INTRACAUDAL; PERINEURAL at 13:25

## 2025-02-18 RX ADMIN — DEXAMETHASONE SODIUM PHOSPHATE 10 MG: 10 INJECTION, SOLUTION INTRAMUSCULAR; INTRAVENOUS at 14:15

## 2025-02-18 RX ADMIN — ONDANSETRON 4 MG: 2 INJECTION INTRAMUSCULAR; INTRAVENOUS at 15:09

## 2025-02-18 RX ADMIN — PROPOFOL 150 MG: 10 INJECTION, EMULSION INTRAVENOUS at 14:10

## 2025-02-18 RX ADMIN — MIDAZOLAM 2 MG: 1 INJECTION INTRAMUSCULAR; INTRAVENOUS at 13:25

## 2025-02-18 RX ADMIN — DEXAMETHASONE SODIUM PHOSPHATE 2 MG: 4 INJECTION, SOLUTION INTRAMUSCULAR; INTRAVENOUS at 13:25

## 2025-02-18 RX ADMIN — SODIUM CHLORIDE, SODIUM LACTATE, POTASSIUM CHLORIDE, AND CALCIUM CHLORIDE: .6; .31; .03; .02 INJECTION, SOLUTION INTRAVENOUS at 13:24

## 2025-02-18 NOTE — H&P
Assessment/Plan:  1. Primary osteoarthritis of both first carpometacarpal joints  Case request operating room: ARTHROPLASTY THUMB WEILBY     Case request operating room: ARTHROPLASTY THUMB WEILBY             Subjective history, physical examination performed, diagnostic imaging reviewed at today's visit  Diagnosis was discussed  Treatment options were discussed which included nonoperative and operative treatment.    Risks, benefits, and realistic expectations for treatment options were discussed.    The patient was given an opportunity to ask questions.  Questions were answered to the patient's satisfaction.     Through shared decision making, the patient decided to move forward with surgical intervention.  We discussed the suture suspension arthroplasty in detail.  Surgery will be performed under general anesthesia.  We discussed the postoperative recovery.  And the importance of compliance with the postoperative protocol.  He understood that he will have limited use of the operative hand for 8-12 weeks.  Consent voluntarily signed on the day of surgery for left hand              cc: my hands hurt     Subjective:   Rohan Bledsoe is a left hand dominant 68 y.o. male who presents for follow up for left thumb CMC arthritis. He has continued pain in the CMC of the left thumb. He has been wearing his comfort cool brace. He is here to discuss surgical intervention for his arthritis.        Review of Systems   Constitutional:  Negative for chills and fever.   HENT:  Negative for ear pain and sore throat.    Eyes:  Negative for pain and visual disturbance.   Respiratory:  Negative for cough and shortness of breath.    Cardiovascular:  Negative for chest pain and palpitations.   Gastrointestinal:  Negative for abdominal pain and vomiting.   Genitourinary:  Negative for dysuria and hematuria.   Musculoskeletal:  Negative for arthralgias and back pain.   Skin:  Negative for color change and rash.   Neurological:   Negative for seizures and syncope.   All other systems reviewed and are negative.           Medical History        Past Medical History:   Diagnosis Date    Cardiomyopathy (HCC)      Gout      Hyperlipidemia      Hypertension      Inguinal hernia, right      Irregular heartbeat      Kidney stone      Kidney stones      Mediastinal mass       Anterior     Palpitations      Vertigo              Surgical History         Past Surgical History:   Procedure Laterality Date    HERNIA REPAIR         at age 1    SC LAPAROSCOPY SURG RPR INITIAL INGUINAL HERNIA Bilateral 5/13/2021     Procedure: REPAIR HERNIA INGUINAL, LAPAROSCOPIC with mesh;  Surgeon: Andrea Chavez MD;  Location: [facility];  Service: General            Family History         Family History   Problem Relation Age of Onset    Arthritis Mother      Hypertension Mother      Heart disease Mother           CHF    Gout Father      Cancer Father      Gout Family      Hypertension Brother      Gout Brother      Hypertension Brother      Gout Brother      Hypertension Brother      Hypertension Brother      Scoliosis Brother      Substance Abuse Neg Hx      Alcohol abuse Neg Hx      Mental illness Neg Hx              Social History            Occupational History    Occupation: fulltime   Tobacco Use    Smoking status: Never    Smokeless tobacco: Never    Tobacco comments:       Former   Vaping Use    Vaping status: Never Used   Substance and Sexual Activity    Alcohol use: Yes    Drug use: No    Sexual activity: Yes       Partners: Female           Current Medications      Current Outpatient Medications:     allopurinol (ZYLOPRIM) 300 mg tablet, TAKE ONE TABLET BY MOUTH EVERY DAY, Disp: 90 tablet, Rfl: 0    aspirin 325 mg tablet, Take 325 mg by mouth as needed for mild pain, Disp: , Rfl:     B Complex Vitamins (VITAMIN B COMPLEX PO), Take by mouth daily, Disp: , Rfl:     metoprolol succinate (TOPROL-XL) 50 mg 24 hr tablet, Take 1 tablet (50 mg total) bymouth daily,  Disp: 90 tablet, Rfl: 1    spironolactone (ALDACTONE) 25 mg tablet, Take 1 tablet (25 mg total) by mouth daily, Disp: 90 tablet, Rfl: 3    valsartan-hydrochlorothiazide (DIOVAN-HCT) 80-12.5 MG per tablet, Take 1 tablet by mouth daily, Disp: 100 tablet, Rfl: 1        Allergies         Allergies   Allergen Reactions    Lisinopril Swelling    Shellfish Allergy - Food Allergy Other (See Comments)       Pt states it causes gout             Objective:      Vitals:     09/16/24 0834   BP: 100/59         The patient was awake, alert, and oriented to person, place, and time.  No acute distress.  Normocephalic.  EOMI.  Mucous membranes moist.  Normal respiratory effort.     Examination of the affected extremity was compared to the unaffected extremity.  Skin was intact.  No swelling or ecchymosis.  No deformity.  Hand and fingers were warm and well-perfused.  Capillary refill was brisk.  Full active range of motion of the elbows, forearms, wrists, and fingers.  5/5 elbow flexors/extensors, wrist flexor/extensors, and .      Bilateral hands: full digital ROM, + CMC grind bilaterally, CMC joint tenderness bilaterally, no A1 pulley tenderness bilaterally, thumb metacarpal abduction with compensatory MP hyperextension on the right, mild tenderness at the tendons of the first dorsal compartment bilaterally     Imaging/Diagnostic Studies:     I reviewed the x-rays of the left hand dated 8/9/2024.  These x-rays demonstrated degenerative changes in the left thumb CMC joint.  The STT joint was preserved.           This document was created using speech voice recognition software.   Grammatical errors, random word insertions, pronoun errors, and incomplete sentences are an occasional consequence of this system due to software limitations, ambient noise, and hardware issues.   Any formal questions or concerns about content, text, or information contained within the body of this dictation should be directly addressed to the provider  for clarification.

## 2025-02-18 NOTE — ANESTHESIA POSTPROCEDURE EVALUATION
Post-Op Assessment Note    CV Status:  Stable  Pain Score: 0    Pain management: adequate       Mental Status:  Alert and awake   Hydration Status:  Stable   PONV Controlled:  None   Airway Patency:  Patent    No anethesia notable event occurred.    Staff: Anesthesiologist           Last Filed PACU Vitals:  Vitals Value Taken Time   Temp 98.5 °F (36.9 °C) 02/18/25 1600   Pulse 64 02/18/25 1620   /68 02/18/25 1617   Resp 28 02/18/25 1620   SpO2 97 % 02/18/25 1620   Vitals shown include unfiled device data.    Modified [place]:     Vitals Value Taken Time   Activity 2 02/18/25 1545   Respiration 2 02/18/25 1545   Circulation 2 02/18/25 1545   Consciousness 2 02/18/25 1545   Oxygen Saturation 2 02/18/25 1545     Modified [place] Score: 10

## 2025-02-18 NOTE — ANESTHESIA PROCEDURE NOTES
Peripheral Block    Patient location during procedure: holding area  Start time: 2/18/2025 1:25 PM  Reason for block: at surgeon's request and post-op pain management  Staffing  Performed by: Rachel Pimentel MD  Authorized by: Rachel Pimentel MD    Preanesthetic Checklist  Completed: patient identified, IV checked, site marked, risks and benefits discussed, surgical consent, monitors and equipment checked, pre-op evaluation and timeout performed  Peripheral Block  Patient position: sitting  Prep: ChloraPrep  Patient monitoring: frequent blood pressure checks, continuous pulse oximetry and heart rate  Block type: Supraclavicular  Laterality: left  Injection technique: single-shot  Procedures: ultrasound guided, Ultrasound guidance required for the procedure to increase accuracy and safety of medication placement and decrease risk of complications.  Ultrasound permanent image saved  bupivacaine (PF) (MARCAINE) 0.5 % injection 20 mL - Perineural   30 mL - 2/18/2025 1:25:00 PM  dexamethasone (DECADRON) injection 4 mg - Perineural   2 mg - 2/18/2025 1:25:00 PM  midazolam (VERSED) injection 0.5 mg - Intravenous   2 mg - 2/18/2025 1:25:00 PM (lido 1% 1 ml to skin)  Needle  Needle type: Stimuplex   Needle length: 4 in  Needle localization: anatomical landmarks and ultrasound guidance  Assessment  Injection assessment: incremental injection, frequent aspiration, injected with ease, negative aspiration, negative for heart rate change, no paresthesia on injection, no symptoms of intraneural/intravenous injection and needle tip visualized at all times  Paresthesia pain: none  Post-procedure:  site cleaned  patient tolerated the procedure well with no immediate complications  Additional Notes  Easy visualization, excellent spread, pt comfortable and conversant, no parasthesias

## 2025-02-18 NOTE — ANESTHESIA POSTPROCEDURE EVALUATION
Post-Op Assessment Note    CV Status:  Stable  Pain Score: 0    Pain management: adequate       Mental Status:  Alert and awake   Hydration Status:  Euvolemic   PONV Controlled:  Controlled   Airway Patency:  Patent     Post Op Vitals Reviewed: Yes    No anethesia notable event occurred.    Staff: CRNA           Last Filed PACU Vitals:  Vitals Value Taken Time   Temp 97    Pulse 68 02/18/25 1535   /78 02/18/25 1532   Resp 18 02/18/25 1535   SpO2 93 % 02/18/25 1535   Vitals shown include unfiled device data.

## 2025-02-18 NOTE — OP NOTE
OPERATIVE REPORT  PATIENT NAME: Rohan Bledsoe    :  1955  MRN: [id number]  Pt Location:  OR ROOM 04    SURGERY DATE: 2025    Surgeons and Role:     * Radha Donaldson MD - Primary     * Kenneth Brunner PA-C - Assisting    Preop Diagnosis:  Primary osteoarthritis of both first carpometacarpal joints [M18.0]    Post-Op Diagnosis Codes:     * Primary osteoarthritis of both first carpometacarpal joints [M18.0]    Procedure(s):  Left - ARTHROPLASTY THUMB WEILBY    Specimen(s):  * No specimens in log *    Estimated Blood Loss:   Minimal    Drains:  * No LDAs found *    Anesthesia Type:   General    Operative Indications:  Primary osteoarthritis of both first carpometacarpal joints [M18.0]  Left hand addressed today    Operative Findings:  Degenerative changes at thumb CMC joint   Scaphotrapezoidal joint with minimal DJD        Complications:   None    Procedure and Technique:  The patient was correctly identified in the preanesthesia holding area.  The operative site was identified and marked with a marker.  We reviewed the informed consent which included the planned surgical procedure, risk, benefits, alternatives, as well as, postoperative care expectations.  Questions were answered to the patient satisfaction.  The patient voluntarily signed informed consent.    The patient was taken back to the operating room.  The patient was positioned in a supine position on the operating table.  The anesthesiologist had performed a regional block to the left upper extremity in the pre-anesthesia holding area.  Care was taken support the left upper extremity during transfer from the [facility] to the operative table.  I applied tourniquet to the operative extremity.  The anesthesiologist sedated the patient and secured the airway.  Prophylactic antibiotics were administered intravenously.  The operative extremity was prepped and draped in a sterile fashion.  A timeout was performed.  I used a marker to  plan the surgical incision between the glabrous and ongoing first skin at the level of the thumb CMC joint. Next, I elevated the upper extremity, applied an Esmarch, and inflated the tourniquet to 250 mmHg.  I used a #15 blade to make the skin incision.  I raised full-thickness skin flaps and retracted the skin flaps.  Identified the insertion of the thenars, elevated the thenars from the joint capsule and reflected the thenars ulnarly.  I made an incision in the thumb CMC joint capsule and elevated the capsule from the trapezium and the base of the thumb metacarpal.  I directly visualized the trapezium.  I used fluoroscopy to confirm the location of the trapezium and the scaphoid trapezial joint.  I used a #15 blade and a freer elevator to elevate the joint capsule.  Retractors were positioned to allow for direct visualization of the edges of the trapezium into protect superficial nerves.  Identified the roof of the trapezium overlying the FCR tendon.  I used rongeurs to elevate the roof of the trapezium from the FCR tendon.  Next, I remove the trapezium in a piecemeal fashion.  Once the trapezium and the osteophyte between the base of the first and second metacarpals were removed, I evaluated the arthroplasty space using fluoroscopy.  The arthroplasty space was found to be satisfactory.  I positioned the thumb in a fist abducted position and performed a suture suspension arthroplasty using 2-0 Ethibond suture.  Next I evaluated the integrity of the suture suspension arthroplasty under fluoroscopy.  The thumb metacarpal base was closely approximated to the index finger metacarpal base.  Axial load was applied to the thumb and the suture suspension was found to maintain arthroplasty space.    I irrigated the wound with normal saline delivered the syringe.  Hemostasis was achieved using electrocautery during the surgical procedure and direct pressure following closure.  The position of the thumb was maintained in the  fist abducted position during closure.  I closed the joint capsule using 2-0 Vicryl.  I approximated the thenars over the Ethibond suture knot of the suture suspension.  The thenars were approximated using 2-0 Vicryl.  I approximated the skin using 4-0 nylon in horizontal mattress pattern.  The tourniquet  was deflated at 50 minutes.  A sterile dressing and a thumb spica splint were applied to the operative extremity.  The patient tolerated the procedure well and was taken to the postanesthesia care unit in stable condition.  I, Radha Donaldson, performed the entire procedure.  A qualified resident physician was not available. and A physician assistant was required during the procedure for retraction, tissue handling, dissection and suturing.    Postoperative plan:   The patient was instructed on activity limitations, elevation, and use of ice for pain management.    Pain management will include the use of over-the-counter Tylenol and toradol.  The patient was given instructions on appropriate dosing and timing for postoperative use of Tylenol and toradol. After completion of toradol prescription, he may use tylenol and ibuprofen for pain management.  The patient will follow-up in 7 to 10 days for removal of the splint, sutures, and to have a short opponens splint fabricated by the hand therapist.  Post-operative therapy will be according to protocol.         Patient Disposition:  PACU              SIGNATURE: Radha Donaldson MD  DATE: February 18, 2025  TIME: 3:25 PM

## 2025-02-18 NOTE — ANESTHESIA PREPROCEDURE EVALUATION
Procedure:  ARTHROPLASTY THUMB WEILBY (Left: Finger)    Relevant Problems   ANESTHESIA (within normal limits)      CARDIO   (+) Benign hypertension   (+) Hyperlipidemia      GI/HEPATIC   (+) GERD without esophagitis (controlled)      MUSCULOSKELETAL   (+) Gout      PULMONARY   (+) JOSE ELIAS (obstructive sleep apnea) (+ home study, did not tolerate CPAP)      Cardiovascular/Peripheral Vascular   (+) Other restrictive cardiomyopathy (HCC) (Nonischemic Cardiomyopathy)      Physical Exam    Airway    Mallampati score: I  TM Distance: >3 FB  Neck ROM: full     Dental   No notable dental hx     Cardiovascular      Pulmonary      Other Findings      Lab Results   Component Value Date    WBC 7.06 09/13/2024    HGB 14.1 09/13/2024     09/13/2024     Lab Results   Component Value Date    SODIUM 140 09/13/2024    K 3.8 09/13/2024    BUN 18 09/13/2024    CREATININE 0.68 09/13/2024    EGFR 96 09/13/2024    GLUCOSE 99 06/28/2017     Lab Results   Component Value Date    HGBA1C 5.4 06/12/2024     TTE 9/2024 Interpretation Summary    Left Ventricle: Left ventricular cavity size is normal. Wall thickness is normal. There is no concentric hypertrophy. The left ventricular ejection fraction is 45%. Systolic function is mildly reduced. There is mild global hypokinesis. Diastolic function is mildly abnormal, consistent with grade I (abnormal) relaxation.    Left Atrium: The atrium is mildly dilated.    Aortic Valve: There is aortic valve sclerosis.    Anesthesia Plan  ASA Score- 2     Anesthesia Type- general with ASA Monitors.         Additional Monitors:     Airway Plan: LMA.    Comment: Offered nerve block after discussion with surgeon - pt would like to proceed with block for post op pain control.       Plan Factors-Exercise tolerance (METS): >4 METS.    Chart reviewed.   Existing labs reviewed. Patient summary reviewed.    Patient is not a current smoker.              Induction- intravenous.    Postoperative Plan-         Informed  Consent- Anesthetic plan and risks discussed with patient.  I personally reviewed this patient with the CRNA. Discussed and agreed on the Anesthesia Plan with the CRNA..      NPO Status:  Vitals Value Taken Time   Date of last liquid 02/18/25 02/18/25 1241   Time of last liquid 1000 02/18/25 1241   Date of last solid 02/17/25 02/18/25 1241   Time of last solid 2000 02/18/25 1241

## 2025-02-19 ENCOUNTER — TELEPHONE (OUTPATIENT)
Age: 70
End: 2025-02-19

## 2025-02-19 NOTE — TELEPHONE ENCOUNTER
Caller: Patient     Call back#: [phone]    Best call back time am/pm/all day: all day    Doctor: Dr Donaldson     Surgery: yes    Date of Surgery: 2/19/2025    Reason for call: Tingling and warmness patient is not having pain at this moment he is concerned of the tingling and wants to make sure it's normal pt was transferred to nurse       Urgent matters - Please Teams message Nurse Navigator Team Chat & send phone note to Orthopedic Nurse Navigator Pool as high priority before transferring call.   Non-Urgent matters - Please send a phone note to Orthopedic Nurse Navigator Pool only. Nurse Navigators will call patients back asap.

## 2025-02-19 NOTE — TELEPHONE ENCOUNTER
"Received transfer call from pt. Pt had on 2/18/25  ARTHROPLASTY THUMB WEILBY (Left: Finger) w/a nerve block. Pt state that he is experiencing left hand numbness/tingling, warmth, and little swelling to hand. Hand has been elevated. Arm remains in sling. Pt states left arm remains \"dead weight\".  Can move fingers. Thumb is splinted so not moving.  Cap refill good. Advised pt that sounds like this is normal and expected as nerve block is wearing off (review of chart nerve block given around 1:25 yesterday).  He should remain in sling until he has full function of arm to prevent injury. Should continue to take pain meds as per AVS-Tylenol and Toradol. Ice and elevate.  CB if after block wears off he continues to have numbness/tingling and any other concerns or questions. Reminded of f/u appt.   "

## 2025-03-03 ENCOUNTER — OFFICE VISIT (OUTPATIENT)
Dept: CARDIOLOGY CLINIC | Facility: CLINIC | Age: 70
End: 2025-03-03
Payer: COMMERCIAL

## 2025-03-03 VITALS
HEART RATE: 54 BPM | WEIGHT: 163 LBS | HEIGHT: 65 IN | SYSTOLIC BLOOD PRESSURE: 122 MMHG | BODY MASS INDEX: 27.16 KG/M2 | DIASTOLIC BLOOD PRESSURE: 64 MMHG

## 2025-03-03 DIAGNOSIS — I10 BENIGN HYPERTENSION: ICD-10-CM

## 2025-03-03 DIAGNOSIS — I42.8 NONISCHEMIC CARDIOMYOPATHY (HCC): Primary | ICD-10-CM

## 2025-03-03 DIAGNOSIS — I49.3 FREQUENT PVCS: ICD-10-CM

## 2025-03-03 PROCEDURE — 99214 OFFICE O/P EST MOD 30 MIN: CPT | Performed by: INTERNAL MEDICINE

## 2025-03-03 NOTE — PROGRESS NOTES
Cardiology Follow Up    Rohan Bledsoe  1955  [id number]  [facility] [address] [id number] [phone]    1. Nonischemic cardiomyopathy (HCC)        2. Benign hypertension        3. Frequent PVCs            Interval History: Followup Nonischemic Cardiomyopathy    Recent left arthroplasty of his thumb.     No chest pain, dyspnea or palpitations. Recovering from his surgery. No major issues or complaints.       Medical Problems       Problem List       Arthritis    Benign hypertension    GERD without esophagitis    Gout    Hyperlipidemia    Other restrictive cardiomyopathy (HCC)    Mediastinal mass    JOSE ELIAS (obstructive sleep apnea)    Need for pneumococcal vaccination    Status post laparoscopic hernia repair    Other hemorrhoids    COVID-19 virus infection    Epididymitis    Ingrown toenail    Tendonitis    Primary osteoarthritis of both first carpometacarpal joints        Past Medical History:   Diagnosis Date    Cardiomyopathy (HCC)     Gout     Hyperlipidemia     Hypertension     Inguinal hernia, right     Irregular heartbeat     Kidney stone     Kidney stones     Mediastinal mass     Anterior     Palpitations     Sleep apnea     does not use CPAP    Vertigo      Social History     Socioeconomic History    Marital status: /Civil Union     Spouse name: Not on file    Number of children: Not on file    Years of education: Not on file    Highest education level: Not on file   Occupational History    Occupation: fulltime   Tobacco Use    Smoking status: Never    Smokeless tobacco: Never    Tobacco comments:     Former   Vaping Use    Vaping status: Never Used   Substance and Sexual Activity    Alcohol use: Yes     Alcohol/week: 7.0 standard drinks of alcohol     Types: 7 Standard drinks or equivalent per week     Comment: one with dinner    Drug use: No    Sexual activity: Yes     Partners:  Female   Other Topics Concern    Not on file   Social History Narrative    Not on file     Social Drivers of Health     Financial Resource Strain: Not on file   Food Insecurity: Not on file   Transportation Needs: Not on file   Physical Activity: Not on file   Stress: Not on file   Social Connections: Not on file   Intimate Partner Violence: Not on file   Housing Stability: Not on file      Family History   Problem Relation Age of Onset    Arthritis Mother     Hypertension Mother     Heart disease Mother         CHF    Gout Father     Cancer Father     Gout Family     Hypertension Brother     Gout Brother     Hypertension Brother     Gout Brother     Hypertension Brother     Hypertension Brother     Scoliosis Brother     Substance Abuse Neg Hx     Alcohol abuse Neg Hx     Mental illness Neg Hx      Past Surgical History:   Procedure Laterality Date    HERNIA REPAIR      at age 1    IA ARTHRP INTERCARPAL/CARP/MTCRPL JT INTERPOSITION Left 2/18/2025    Procedure: ARTHROPLASTY THUMB WEILBY;  Surgeon: Radha Donaldson MD;  Location:  MAIN OR;  Service: Orthopedics    IA LAPAROSCOPY SURG RPR INITIAL INGUINAL HERNIA Bilateral 5/13/2021    Procedure: REPAIR HERNIA INGUINAL, LAPAROSCOPIC with mesh;  Surgeon: Andrea Chavez MD;  Location:  MAIN OR;  Service: General       Current Outpatient Medications:     acetaminophen (TYLENOL) 500 mg tablet, Take 500 mg by mouth every 6 (six) hours as needed for mild pain, Disp: , Rfl:     allopurinol (ZYLOPRIM) 300 mg tablet, TAKE ONE TABLET BY MOUTH EVERY DAY, Disp: 90 tablet, Rfl: 1    aspirin 325 mg tablet, Take 325 mg by mouth as needed for mild pain, Disp: , Rfl:     B Complex Vitamins (VITAMIN B COMPLEX PO), Take by mouth daily With multivitamin, Disp: , Rfl:     Melatonin 5 MG TABS, Take by mouth, Disp: , Rfl:     metoprolol succinate (TOPROL-XL) 50 mg 24 hr tablet, Take 1 tablet (50 mg total) by mouth daily, Disp: 90 tablet, Rfl: 1    spironolactone (ALDACTONE) 25 mg  "tablet, Take 1 tablet (25 mg total) by mouth daily, Disp: 90 tablet, Rfl: 3    valsartan-hydrochlorothiazide (DIOVAN-HCT) 80-12.5 MG per tablet, Take 1 tablet by mouth daily, Disp: 100 tablet, Rfl: 1  Allergies   Allergen Reactions    Lisinopril Swelling    Shellfish Allergy - Food Allergy Other (See Comments)     Pt states it causes gout        Labs:     Chemistry        Component Value Date/Time     06/28/2017 0000    K 3.8 09/13/2024 0736    K 5.0 06/28/2017 0000     09/13/2024 0736     06/28/2017 0000    CO2 22 09/13/2024 0736    CO2 20 06/28/2017 0000    BUN 18 09/13/2024 0736    BUN 10 06/28/2017 0000    CREATININE 0.68 09/13/2024 0736    CREATININE 0.76 06/28/2017 0000        Component Value Date/Time    CALCIUM 9.1 09/13/2024 0736    CALCIUM 9.3 06/28/2017 0000    ALKPHOS 49 09/06/2023 0837    ALKPHOS 60 06/28/2017 0000    AST 18 09/06/2023 0837    AST 29 06/28/2017 0000    ALT 15 09/06/2023 0837    ALT 31 06/28/2017 0000    BILITOT 0.4 06/28/2017 0000            Lab Results   Component Value Date    CHOL 175 06/28/2017     Lab Results   Component Value Date    HDL 46 06/12/2024    HDL 42 09/06/2023    HDL 36 (L) 08/24/2022     Lab Results   Component Value Date    LDLCALC 81 06/12/2024    LDLCALC 98 09/06/2023    LDLCALC 96 08/24/2022     Lab Results   Component Value Date    TRIG 78 06/12/2024    TRIG 142 09/06/2023    TRIG 143 08/24/2022     No results found for: \"CHOLHDL\"    Imaging: XR thumb left first digit-min 2v  Result Date: 2/21/2025  Narrative: C-ARM - XR THUMB LEFT FIRST DIGIT-MIN 2V INDICATION: thumb arthorplasty left. Procedure guidance. TECHNIQUE: Fluoroscopic guidance provided. COMPARISON: Left hand x-ray dated August 9, 2024 FLUOROSCOPY TIME: 9.6 seconds 4 FLUOROSCOPIC IMAGES FINDINGS: Fluoroscopy provided for left thumb arthroplasty. Osseous and soft tissue detail limited by technique.     Impression: Fluoroscopy provided for procedure guidance. Please refer to the " separate procedure note for additional details. Workstation performed: [id number]      bedside procedure  Result Date: 2/18/2025  Narrative: [id number]      EKG: .    Review of Systems   Constitutional: Negative.   HENT: Negative.     Eyes: Negative.    Cardiovascular: Negative.    Respiratory: Negative.     Endocrine: Negative.    Hematologic/Lymphatic: Negative.    Skin: Negative.    Musculoskeletal: Negative.    Gastrointestinal: Negative.    Genitourinary: Negative.    Neurological: Negative.    Psychiatric/Behavioral: Negative.     Allergic/Immunologic: Negative.        Vitals:    03/03/25 0945   BP: 122/64   Pulse: (!) 54           Physical Exam  Vitals reviewed.   Constitutional:       Appearance: Normal appearance.   HENT:      Head: Normocephalic.      Nose: Nose normal.      Mouth/Throat:      Mouth: Mucous membranes are moist.      Pharynx: Oropharynx is clear.   Eyes:      General: No scleral icterus.     Conjunctiva/sclera: Conjunctivae normal.   Cardiovascular:      Rate and Rhythm: Normal rate and regular rhythm.      Heart sounds: No murmur heard.     No friction rub. No gallop.   Pulmonary:      Effort: Pulmonary effort is normal. No respiratory distress.      Breath sounds: Normal breath sounds. No wheezing or rales.   Abdominal:      General: Abdomen is flat. Bowel sounds are normal. There is no distension.      Palpations: Abdomen is soft.      Tenderness: There is no abdominal tenderness. There is no guarding.   Musculoskeletal:      Cervical back: Normal range of motion and neck supple.      Right lower leg: No edema.      Left lower leg: No edema.   Skin:     General: Skin is warm and dry.   Neurological:      General: No focal deficit present.      Mental Status: He is alert and oriented to person, place, and time.   Psychiatric:         Mood and Affect: Mood normal.         Behavior: Behavior normal.         Discussion/Summary:    1. Nonischemic Cardiomyopathy:   Overall doing well. Cardiac CT and Cardiac MRI are negative. LVEF 45%.  Stable. No changes to medical therapy today.      2. Frequent PVCs / PACs / PSVT: Continue metoprolol. Minimal symptoms.       3. Hypertension: His BP is under adequate control. No changes today.     I have spent a total time of 25 minutes in caring for this patient on the day of the visit/encounter including Diagnostic results, Prognosis, Risks and benefits of tx options, Instructions for management, Patient and family education, Importance of tx compliance, and Risk factor reductions.

## 2025-03-10 ENCOUNTER — TELEPHONE (OUTPATIENT)
Age: 70
End: 2025-03-10

## 2025-03-10 NOTE — TELEPHONE ENCOUNTER
Chart reviewed - he should be on light duty restrictions until next post-op visit on 3/18/2025.    Looks like papers for work were filled out before the surgery and it is scanned in the chart.

## 2025-03-10 NOTE — TELEPHONE ENCOUNTER
Caller: Patient    Doctor: Dr. Donaldson    Reason for call: Patient asking if a note allowing him to return to work light duty can be placed in [employer].      Call back#: [phone]

## 2025-03-10 NOTE — TELEPHONE ENCOUNTER
Caller: Patient    Doctor/Office: Dr Donaldson    CB#: [phone]    Work or school note: work note    Return to work/school date: Patient is requesting a note to return to work (he is currently at work today)     Fax #: will look up in [employer]    Is there any restrictions that need to be updated? If so, what? Patient states he is on light duty

## 2025-03-18 ENCOUNTER — APPOINTMENT (OUTPATIENT)
Dept: RADIOLOGY | Facility: CLINIC | Age: 70
End: 2025-03-18
Payer: COMMERCIAL

## 2025-03-18 ENCOUNTER — OFFICE VISIT (OUTPATIENT)
Dept: OCCUPATIONAL THERAPY | Facility: CLINIC | Age: 70
End: 2025-03-18
Payer: COMMERCIAL

## 2025-03-18 ENCOUNTER — OFFICE VISIT (OUTPATIENT)
Dept: OBGYN CLINIC | Facility: CLINIC | Age: 70
End: 2025-03-18

## 2025-03-18 VITALS — BODY MASS INDEX: 27.12 KG/M2 | HEIGHT: 65 IN

## 2025-03-18 DIAGNOSIS — M18.0 PRIMARY OSTEOARTHRITIS OF BOTH FIRST CARPOMETACARPAL JOINTS: Primary | ICD-10-CM

## 2025-03-18 DIAGNOSIS — M18.0 PRIMARY OSTEOARTHRITIS OF BOTH FIRST CARPOMETACARPAL JOINTS: ICD-10-CM

## 2025-03-18 PROCEDURE — L3913 HFO W/O JOINTS CF: HCPCS | Performed by: OCCUPATIONAL THERAPIST

## 2025-03-18 PROCEDURE — 73140 X-RAY EXAM OF FINGER(S): CPT

## 2025-03-18 PROCEDURE — 99024 POSTOP FOLLOW-UP VISIT: CPT | Performed by: ORTHOPAEDIC SURGERY

## 2025-03-18 NOTE — ASSESSMENT & PLAN NOTE
Patient doing well following left thumb CMC joint arthroplasty using suture suspension technique.  Will be seen by C[facility] today for fabrication of short opponens splint and for HEP.   Schedule appointment to begin formal therapy  Follow up in 4 weeks for clinical evaluation  Orders:    XR thumb left first digit-min 2v; Future

## 2025-03-18 NOTE — PROGRESS NOTES
"    ORTHOPAEDIC HAND, WRIST, AND ELBOW OFFICE  VISIT     Name: Rohan Bledsoe      : 1955      MRN: [id number]  Encounter Provider: Radha Donaldson MD  Encounter Date: 3/18/2025   Encounter department: [facility]  :  Assessment & Plan  Primary osteoarthritis of both first carpometacarpal joints  Patient doing well following left thumb CMC joint arthroplasty using suture suspension technique.  Will be seen by CHT today for fabrication of short opponens splint and for HEP.   Schedule appointment to begin formal therapy  Follow up in 4 weeks for clinical evaluation  Orders:    XR thumb left first digit-min 2v; Future            History of Present Illness   HPI  Chief Complaint   Patient presents with    Left Thumb - Post-op, Pain       SUBJECTIVE:  Rohan Bledsoe is a 69 y.o. male who presents for follow up after Arthroplasty Thumb Weilby - Left on 2025.  Today patient has No Complaints.       PHYSICAL EXAMINATION:  Vital signs: Ht 5' 5\" (1.651 m)   BMI 27.12 kg/m²   General: well developed and well nourished, alert, oriented times 3, and appears comfortable  Psychiatric: Normal    MUSCULOSKELETAL EXAMINATION: left thumb  Incision: healed  Range of Motion: As expected  Neurovascular status: Neuro intact, good cap refill  Activity Restrictions: Restrictions: No lifting, pushing, pulling, gripping, grasping with operative hand.  Done today: Sutures out      STUDIES REVIEWED:  Images were reviewed in PACS and demonstrate: Preservation of the CMC arthroplasty space. Base of thumb MC is approximated to base of IF MC.      PROCEDURES PERFORMED:  Procedures  No Procedures performed today   "

## 2025-03-31 ENCOUNTER — EVALUATION (OUTPATIENT)
Dept: OCCUPATIONAL THERAPY | Facility: CLINIC | Age: 70
End: 2025-03-31
Payer: COMMERCIAL

## 2025-03-31 DIAGNOSIS — M18.0 PRIMARY OSTEOARTHRITIS OF BOTH FIRST CARPOMETACARPAL JOINTS: Primary | ICD-10-CM

## 2025-03-31 PROCEDURE — 97140 MANUAL THERAPY 1/> REGIONS: CPT | Performed by: OCCUPATIONAL THERAPIST

## 2025-03-31 PROCEDURE — 97110 THERAPEUTIC EXERCISES: CPT | Performed by: OCCUPATIONAL THERAPIST

## 2025-03-31 PROCEDURE — 97165 OT EVAL LOW COMPLEX 30 MIN: CPT | Performed by: OCCUPATIONAL THERAPIST

## 2025-03-31 NOTE — PROGRESS NOTES
OT Evaluation     Today's date: 3/31/2025  Patient name: Rohan Bledsoe  : 1955  MRN: [id number]  Referring provider: Radha Donaldson,*  Dx:   Encounter Diagnosis     ICD-10-CM    1. Primary osteoarthritis of both first carpometacarpal joints  M18.0                      Assessment  Impairments: abnormal or restricted ROM, lacks appropriate home exercise program and pain with function  Functional limitations: Pt is not pinching or lifting with L dominant hand.  Symptom irritability: low    Assessment details: Pt presents today for evaluation of the L thumb s/p CMC arthroplasty.  He has stiffness in the thumb with mobility.  He has limited use of his L hand with his ADLs.  Pt. Is L hand dominant.  Pt. Wearing a custom CMC splint for protection. He is unable to lift and pinch with is L hand.  Pt. To benefit from skilled hand therapy to improve ROM and return to baseline function.  Pt. Works in [employer] for [employer] and cares for his ill wife.    Understanding of Dx/Px/POC: good     Prognosis: good    Goals  STG( 4 visits)  1. Compliant with HEP/splint  2. Improve L thumb AROM by 5-10 degrees.  LTG( 12 visits or discharge)  1. Full opposition of L thumb with function  2. L  and Pinch strength WFLs for RTW.  3. Improve FOTO score to predicted outcome or greater.  4. Pain free L hand with ADLs.    Plan  Patient would benefit from: skilled occupational therapy and OT eval  Planned modality interventions: cryotherapy and thermotherapy: hydrocollator packs    Planned therapy interventions: activity modification, IASTM, joint mobilization, manual therapy, home exercise program, graded exercise, functional ROM exercises, fine motor coordination training, therapeutic activities, therapeutic exercise and orthotic fitting/training    Frequency: 2x week  Duration in weeks: 12  Plan of Care beginning date: 3/31/2025  Plan of Care expiration date: 2025  Treatment plan discussed with:  patient        Subjective Evaluation    History of Present Illness  Mechanism of injury: surgery  Mechanism of injury: Pt. S/p L thumb CMC arthroplasty on .  Pt. Has transitioned to a [facility] and referred to hand therapy for evaluation and tx.  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     Neurological Testing     Sensation     Wrist/Hand   Left   Intact: light touch    Active Range of Motion     Left Wrist   Wrist flexion: 55 degrees   Wrist extension: 40 degrees   Radial deviation: 10 degrees   Ulnar deviation: 30 degrees     Left Thumb   Flexion     CMC: 10 degrees    MP: 40 degrees    DIP: 50 degrees  Extension     MP: 0 degrees    DIP: 0 degrees  Radial abduction    CMC: 45 degrees  Adduction    CMC: 30 degrees  Kapandji score: 7 degrees      Additional Active Range of Motion Details  Full composite fist L hand    Strength/Myotome Testing     Left Wrist/Hand      (2nd hand position)     Trial 1: 10    Thumb Strength  Key/Lateral Pinch     Trial 1: 0  Tip/Two-Point Pinch     Trial 1: 0  Palmar/Three-Point Pinch     Trial 1: 0    Right Wrist/Hand      (2nd hand position)     Trial 1: 50    Thumb Strength   Key/Lateral Pinch     Trial 1: 12  Tip/Two-Point Pinch     Trial 1: 11  Palmar/Three-Point Pinch     Trial 1: 9            Daily Treatment Diary     Precautions: ROM only- No ADduction  CO-MORBIDITIES:  HEP ACCESS CODE: Thumb AROM  FOTO Completed On: 3/31/25    POC Expires Reeval for  Medicare to be completed  Auth Expiration Date PT/OT/STVisit Limit   5/9/25 By visit   BOMN    Completed on visit                  Auth Status DATE 3/31        NA Visit # 1         Remaining         MANUAL THERAPY IE 30'                 Scar mob 3m        Graston L thumb 4m        STM thenar/webspace 4m                          THERAPEUTIC EXERCISE HEP Thumb AROM        Thumb AROM  2m        IP blocking  1m        Wrist AROM          Manipulation balls          Gripping                                                                                                              NEUROMUSCULAR REEDUCATION                                                                                                                                                       THERAPEUTIC ACTIVITY          Peg           opposition                                                                                MODALITIES         MHP L  8m

## 2025-04-07 ENCOUNTER — OFFICE VISIT (OUTPATIENT)
Dept: OCCUPATIONAL THERAPY | Facility: CLINIC | Age: 70
End: 2025-04-07
Payer: COMMERCIAL

## 2025-04-07 DIAGNOSIS — M18.0 PRIMARY OSTEOARTHRITIS OF BOTH FIRST CARPOMETACARPAL JOINTS: Primary | ICD-10-CM

## 2025-04-07 PROCEDURE — 97140 MANUAL THERAPY 1/> REGIONS: CPT | Performed by: OCCUPATIONAL THERAPIST

## 2025-04-07 PROCEDURE — 97110 THERAPEUTIC EXERCISES: CPT | Performed by: OCCUPATIONAL THERAPIST

## 2025-04-07 NOTE — PROGRESS NOTES
Daily Note     Today's date: 2025  Patient name: Rohan Bledsoe  : 1955  MRN: [id number]  Referring provider: Radha Donaldson,*  Dx:   Encounter Diagnosis     ICD-10-CM    1. Primary osteoarthritis of both first carpometacarpal joints  M18.0                      Subjective: I am better      Objective: See treatment diary below      Assessment: Tolerated treatment well. Patient  has mild soreness. ROM is improving       Plan: Continue per plan of care.      Daily Treatment Diary     Precautions: ROM only- No ADduction  CO-MORBIDITIES:  HEP ACCESS CODE: Thumb AROM  FOTO Completed On: 3/31/25    POC Expires Reeval for Medicare to be completed  Auth Expiration Date PT/OT/STVisit Limit   25 By visit   BOMN    Completed on visit                  Auth Status DATE 3/31 4/7       NA Visit # 1 2        Remaining         MANUAL THERAPY IE 30'                 Scar mob 3m 3m       Graston L thumb 4m 4m       STM thenar/webspace 4m 4m                         THERAPEUTIC EXERCISE HEP Thumb AROM        Thumb AROM  2m 2m       IP blocking  1m 1m       Wrist AROM   2m       Manipulation balls   2m       Gripping   Red  3x10                                                                                                           NEUROMUSCULAR REEDUCATION                                                                                                                                                       THERAPEUTIC ACTIVITY          Peg    30x       opposition   30x                                                                             MODALITIES         MHP L  8m 8m       Cp   5m

## 2025-04-10 ENCOUNTER — APPOINTMENT (OUTPATIENT)
Dept: OCCUPATIONAL THERAPY | Facility: CLINIC | Age: 70
End: 2025-04-10
Payer: COMMERCIAL

## 2025-04-15 ENCOUNTER — OFFICE VISIT (OUTPATIENT)
Dept: OCCUPATIONAL THERAPY | Facility: CLINIC | Age: 70
End: 2025-04-15
Payer: COMMERCIAL

## 2025-04-15 ENCOUNTER — OFFICE VISIT (OUTPATIENT)
Dept: OBGYN CLINIC | Facility: CLINIC | Age: 70
End: 2025-04-15

## 2025-04-15 VITALS — BODY MASS INDEX: 28.49 KG/M2 | HEIGHT: 65 IN | WEIGHT: 171 LBS

## 2025-04-15 DIAGNOSIS — M18.0 PRIMARY OSTEOARTHRITIS OF BOTH FIRST CARPOMETACARPAL JOINTS: Primary | ICD-10-CM

## 2025-04-15 DIAGNOSIS — M10.9 GOUT, UNSPECIFIED CAUSE, UNSPECIFIED CHRONICITY, UNSPECIFIED SITE: ICD-10-CM

## 2025-04-15 DIAGNOSIS — I10 BENIGN HYPERTENSION: ICD-10-CM

## 2025-04-15 PROCEDURE — 97110 THERAPEUTIC EXERCISES: CPT | Performed by: OCCUPATIONAL THERAPIST

## 2025-04-15 PROCEDURE — 97140 MANUAL THERAPY 1/> REGIONS: CPT | Performed by: OCCUPATIONAL THERAPIST

## 2025-04-15 PROCEDURE — 99024 POSTOP FOLLOW-UP VISIT: CPT | Performed by: ORTHOPAEDIC SURGERY

## 2025-04-15 NOTE — PROGRESS NOTES
"    ORTHOPAEDIC HAND, WRIST, AND ELBOW OFFICE  VISIT     Name: Rohan Bledsoe      : 1955      MRN: [id number]  Encounter Provider: Radha Donaldson MD  Encounter Date: 4/15/2025   Encounter department: [facility]  :  Assessment & Plan  Primary osteoarthritis of both first carpometacarpal joints  Assessment:  69 year old Male 8 weeks Left thumb s/p CMC joint arthroplasty with suture sling technique, DOS: 3/18/25    Plan:  Continue OHT HEP  Continue opponens splinting for another 2 weeks and then transition out with OHT  Start strengthening in 2 weeks   Continue scar massage vs OTC silicone tape, did  that abundant scar can limit his motion in the future  Follow-up in 4 weeks                 History of Present Illness   HPI  No chief complaint on file.      SUBJECTIVE:  Rohan Bledsoe is a 69 y.o. male who presents for follow up after Arthroplasty Thumb Weilby - Left on 2025.  Today patient presents to the office with 8 weeks left thumb status post CMC joint arthroplasty with suture sling technique.  Patient reports he is overall feeling well.  Patient reports that his pain which is described more as soreness is improving with time.  Patient did attend occupational therapy and was provided his opponens splint.  Patient continues to work on his overall but specifically the thumb range of motion frequently throughout the day.  Patient reports that upon waking in the morning he does have soreness about the base of the thumb but with continued hand motion this alleviates.  Patient denies any numbness or tingling in the left hand.  Patient reports that he is happy with the progress that he has made thus far.      PHYSICAL EXAMINATION:  Vital signs: Ht 5' 5\" (1.651 m)   BMI 27.12 kg/m²   General: well developed and well nourished, alert, oriented times 3, and appears comfortable  Psychiatric: Normal    MUSCULOSKELETAL EXAMINATION:  Incision: scar " with adhesions. No tenderness  Range of Motion: As expected good thumb IP and MCP ROM  Neurovascular status: Neuro intact, good cap refill  Activity Restrictions: Restrictions: No lifting, pushing, pulling, gripping, grasping with operative hand.          STUDIES REVIEWED:  No Studies to review      PROCEDURES PERFORMED:  Procedures  No Procedures performed today

## 2025-04-15 NOTE — PROGRESS NOTES
Daily Note     Today's date: 4/15/2025  Patient name: Rohan Bledsoe  : 1955  MRN: [id number]  Referring provider: Radha Donaldson,*  Dx:   Encounter Diagnosis     ICD-10-CM    1. Primary osteoarthritis of both first carpometacarpal joints  M18.0                      Subjective: I am doing better      Objective: See treatment diary below      Assessment: Tolerated treatment well. Patient  has improved ROM and pain.       Plan: Continue per plan of care.      Daily Treatment Diary     Precautions: ROM only- No ADduction  CO-MORBIDITIES:  HEP ACCESS CODE: Thumb AROM  FOTO Completed On: 3/31/25    POC Expires Reeval for Medicare to be completed  Auth Expiration Date PT/OT/STVisit Limit   25 By visit   BOMN    Completed on visit                  Auth Status DATE 3/31 4/7 4/15      NA Visit # 1 2 3       Remaining         MANUAL THERAPY IE 30'                 Scar mob 3m 3m 3m      Graston L thumb 4m 4m 4m      STM thenar/webspace 4m 4m 4m                        THERAPEUTIC EXERCISE HEP Thumb AROM        Thumb AROM  2m 2m 2m      IP blocking  1m 1m 1m      Wrist AROM   2m 2m       Manipulation balls   2m 2m      Gripping   Red  3x10 Blue  3x10                                                                                                          NEUROMUSCULAR REEDUCATION                                                                                                                                                       THERAPEUTIC ACTIVITY          Peg    30x 30x      opposition   30x 30x                                                                            MODALITIES         MHP L  8m 8m 8m      Cp   5m 5m

## 2025-04-15 NOTE — ASSESSMENT & PLAN NOTE
Assessment:  69 year old Male 8 weeks Left thumb s/p CMC joint arthroplasty with suture sling technique, DOS: 3/18/25    Plan:  Continue OHT HEP  Continue opponens splinting for another 2 weeks and then transition out with OHT  Start strengthening in 2 weeks   Continue scar massage vs OTC silicone tape, did  that abundant scar can limit his motion in the future  Follow-up in 4 weeks

## 2025-04-16 RX ORDER — ALLOPURINOL 300 MG/1
300 TABLET ORAL DAILY
Qty: 90 TABLET | Refills: 1 | Status: SHIPPED | OUTPATIENT
Start: 2025-04-16

## 2025-04-16 RX ORDER — VALSARTAN AND HYDROCHLOROTHIAZIDE 80; 12.5 MG/1; MG/1
1 TABLET, FILM COATED ORAL DAILY
Qty: 90 TABLET | Refills: 1 | Status: SHIPPED | OUTPATIENT
Start: 2025-04-16

## 2025-04-21 ENCOUNTER — APPOINTMENT (OUTPATIENT)
Dept: OCCUPATIONAL THERAPY | Facility: CLINIC | Age: 70
End: 2025-04-21
Payer: COMMERCIAL

## 2025-04-23 ENCOUNTER — OFFICE VISIT (OUTPATIENT)
Dept: OCCUPATIONAL THERAPY | Facility: CLINIC | Age: 70
End: 2025-04-23
Attending: ORTHOPAEDIC SURGERY
Payer: COMMERCIAL

## 2025-04-23 DIAGNOSIS — M18.0 PRIMARY OSTEOARTHRITIS OF BOTH FIRST CARPOMETACARPAL JOINTS: Primary | ICD-10-CM

## 2025-04-23 PROCEDURE — 97110 THERAPEUTIC EXERCISES: CPT

## 2025-04-23 PROCEDURE — 97530 THERAPEUTIC ACTIVITIES: CPT

## 2025-04-23 PROCEDURE — 97140 MANUAL THERAPY 1/> REGIONS: CPT

## 2025-04-23 NOTE — PROGRESS NOTES
"Daily Note     Today's date: 2025  Patient name: Rohan Bledsoe  : 1955  MRN: [id number]  Referring provider: Radha Donaldson,*  Dx:   Encounter Diagnosis     ICD-10-CM    1. Primary osteoarthritis of both first carpometacarpal joints  M18.0             Start Time: 0725  Stop Time: 0810  Total time in clinic (min): 45 minutes    Subjective: \"It's better than it was.\" Patient reported discomfort with wrist extension. Patient reported that he has difficulty utilizing a guitar pick.       Objective: See treatment diary below      Assessment: Tolerated treatment well. Patient tolerated new exercise/activity this date without complaints of pain. Reviewed protocol this date. Patient to initiate gentle pinch strengthening next week. Patient  benefiting from OT for range of motion and return to meaningful activities.      Plan: Continue per plan of care.      Daily Treatment Diary     Precautions: ROM only- No ADduction  CO-MORBIDITIES:  HEP ACCESS CODE: Thumb AROM  FOTO Completed On: 3/31/25    POC Expires Reeval for Medicare to be completed  Auth Expiration Date PT/OT/STVisit Limit   25 By visit   BOMN    Completed on visit                  Auth Status DATE 3/31 4/7 4/15 4/23     NA Visit # 1 2 3 4      Remaining         MANUAL THERAPY IE 30'                 Scar mob 3m 3m 3m 4m     Graston L thumb 4m 4m 4m 4m     STM thenar/webspace 4m 4m 4m 4m                       THERAPEUTIC EXERCISE HEP Thumb AROM        Thumb AROM  2m 2m 2m 2m     IP blocking  1m 1m 1m 1m     Wrist AROM   2m 2m  2m     Manipulation balls   2m 2m 2m     Gripping   Red web   3x10 Blue web  3x10 Blue web 3x10     TB thumb abd     1m                         NEUROMUSCULAR REEDUCATION                                                                       THERAPEUTIC ACTIVITY          Peg    30x 30x 30x     opposition   30x 30x 30x     Coins- in hand manipulation     x15                                                     "             MODALITIES         MHP L  8m 8m 8m 5m      CP   5m 5m

## 2025-04-28 ENCOUNTER — OFFICE VISIT (OUTPATIENT)
Dept: OCCUPATIONAL THERAPY | Facility: CLINIC | Age: 70
End: 2025-04-28
Attending: ORTHOPAEDIC SURGERY
Payer: COMMERCIAL

## 2025-04-28 DIAGNOSIS — M18.0 PRIMARY OSTEOARTHRITIS OF BOTH FIRST CARPOMETACARPAL JOINTS: Primary | ICD-10-CM

## 2025-04-28 PROCEDURE — 97110 THERAPEUTIC EXERCISES: CPT

## 2025-04-28 PROCEDURE — 97140 MANUAL THERAPY 1/> REGIONS: CPT

## 2025-04-28 NOTE — PROGRESS NOTES
Daily Note     Today's date: 2025  Patient name: Rohan Bledsoe  : 1955  MRN: [id number]  Referring provider: Radha Donaldson,*  Dx:   Encounter Diagnosis     ICD-10-CM    1. Primary osteoarthritis of both first carpometacarpal joints  M18.0               Start Time: 0730  Stop Time: 0815  Total time in clinic (min): 45 minutes    Subjective: Patient offered no change in function       Objective: See treatment diary below      Assessment: Tolerated treatment well. Patient tolerated new exercise/activity this date without complaints of pain. Initiated gentle pinch strengthening this date per protocol. Patient denied any pain with pinch strengthening. Provided putty for patient to utilize as part of home exercise program once daily or every other day if soreness increases. Patient  benefiting from OT for range of motion and strengthening .      Plan: Continue per plan of care.      Daily Treatment Diary     Precautions: ROM only- No ADduction  CO-MORBIDITIES:  HEP ACCESS CODE: Thumb AROM  FOTO Completed On: 3/31/25    POC Expires Reeval for Medicare to be completed  Auth Expiration Date PT/OT/STVisit Limit   25 By visit   BOMN    Completed on visit                  Auth Status DATE 3/31 4/7 4/15 4/23 4/28    NA Visit # 1 2 3 4 5     Remaining         MANUAL THERAPY IE 30'                 Scar mob 3m 3m 3m 4m 2m    Graston L thumb 4m 4m 4m 4m 4m    STM thenar/webspace 4m 4m 4m 4m 2m                      THERAPEUTIC EXERCISE HEP Thumb AROM        Thumb AROM  2m 2m 2m 2m 2m    IP blocking  1m 1m 1m 1m     Wrist AROM   2m 2m  2m 2m    Manipulation balls   2m 2m 2m     Gripping   Red web   3x10 Blue web  3x10 Blue web 3x10     TB thumb abd     1m     Pinching      Y marbles              NEUROMUSCULAR REEDUCATION                                                                       THERAPEUTIC ACTIVITY          Peg    30x 30x 30x 30x    opposition   30x 30x 30x 30x    Coins- in hand  manipulation     x15 30x    Putty- , roll, pinch      Tan                                                      MODALITIES         MHP L  8m 8m 8m 5m  5m    CP   5m 5m

## 2025-04-30 ENCOUNTER — OFFICE VISIT (OUTPATIENT)
Dept: OCCUPATIONAL THERAPY | Facility: CLINIC | Age: 70
End: 2025-04-30
Attending: ORTHOPAEDIC SURGERY
Payer: COMMERCIAL

## 2025-04-30 DIAGNOSIS — M18.0 PRIMARY OSTEOARTHRITIS OF BOTH FIRST CARPOMETACARPAL JOINTS: Primary | ICD-10-CM

## 2025-04-30 PROCEDURE — 97110 THERAPEUTIC EXERCISES: CPT

## 2025-04-30 PROCEDURE — 97140 MANUAL THERAPY 1/> REGIONS: CPT

## 2025-04-30 PROCEDURE — 97530 THERAPEUTIC ACTIVITIES: CPT

## 2025-04-30 NOTE — PROGRESS NOTES
"Daily Note     Today's date: 2025  Patient name: Rohan Bledsoe  : 1955  MRN: [id number]  Referring provider: Radha Donaldson,*  Dx:   Encounter Diagnosis     ICD-10-CM    1. Primary osteoarthritis of both first carpometacarpal joints  M18.0                 Start Time: 0715  Stop Time: 0800  Total time in clinic (min): 45 minutes    Subjective: \"I can feel the improvements.\"      Objective: See treatment diary below      Assessment: Tolerated treatment well. Patient reported being compliant with new strengthening home exercise program. He denied any pain during or after performing strengthening. Patient educated on manual release at web space. Patient  benefiting from OT for range of motion and strengthening .      Plan: Continue per plan of care.      Daily Treatment Diary     Precautions: s/p L thumb Weil[place]   CO-MORBIDITIES:  HEP ACCESS CODE: Thumb AROM  FOTO Completed On: 3/31/25    POC Expires Reeval for Medicare to be completed  Auth Expiration Date PT/OT/STVisit Limit   25 By visit   BOMN    Completed on visit                  Auth Status DATE 3/31 4/7 4/15 4/23 4/28 4/30   NA Visit # 1 2 3 4 5 6    Remaining         MANUAL THERAPY IE 30'                 Scar mob 3m 3m 3m 4m 2m 2m   Graston L thumb 4m 4m 4m 4m 4m 4m   STM thenar/webspace 4m 4m 4m 4m 2m 4m                     THERAPEUTIC EXERCISE HEP Thumb AROM        Thumb AROM  2m 2m 2m 2m 2m 2m   IP blocking  1m 1m 1m 1m     Wrist AROM   2m 2m  2m 2m 2m   Manipulation balls   2m 2m 2m     Gripping   Red web   3x10 Blue web  3x10 Blue web 3x10  Blue web 3x10   TB thumb abd     1m     Pinching- lat and 3 pt       Y marbles Y marbles             NEUROMUSCULAR REEDUCATION                                                                       THERAPEUTIC ACTIVITY          Peg    30x 30x 30x 30x 30x   opposition   30x 30x 30x 30x 30x   Coins- in hand manipulation     x15 30x 30x   Putty- , roll, pinch      Tan                  "                                     MODALITIES         MHP L  8m 8m 8m 5m  5m 5m   CP   5m 5m

## 2025-05-05 ENCOUNTER — OFFICE VISIT (OUTPATIENT)
Dept: OCCUPATIONAL THERAPY | Facility: CLINIC | Age: 70
End: 2025-05-05
Attending: ORTHOPAEDIC SURGERY
Payer: COMMERCIAL

## 2025-05-05 DIAGNOSIS — M18.0 PRIMARY OSTEOARTHRITIS OF BOTH FIRST CARPOMETACARPAL JOINTS: Primary | ICD-10-CM

## 2025-05-05 PROCEDURE — 97110 THERAPEUTIC EXERCISES: CPT

## 2025-05-05 PROCEDURE — 97530 THERAPEUTIC ACTIVITIES: CPT

## 2025-05-05 NOTE — PROGRESS NOTES
"Daily Note     Today's date: 2025  Patient name: Rohan Bledsoe  : 1955  MRN: [id number]  Referring provider: Radha Donaldson,*  Dx:   Encounter Diagnosis     ICD-10-CM    1. Primary osteoarthritis of both first carpometacarpal joints  M18.0             Start Time: 0725  Stop Time: 0810  Total time in clinic (min): 45 minutes      Subjective: \"It's feeling pretty decent.\" Patient reported that he was planting this weekend, but that he was careful not to overuse it. He reported that he cleaned the shower, which was difficult.       Objective: See treatment diary below      Assessment: Tolerated treatment well. Patient tolerated new  strengthening exercise and increase in pinching. He denied any pain during or after performing strengthening. Patient educated on self massage to be performed at home with tennis ball or knuckles for tightness in opponens. Patient  benefiting from OT for range of motion and strengthening .      Plan: Continue per plan of care.      Daily Treatment Diary     Precautions: s/p L thumb Weil[place]   CO-MORBIDITIES:  HEP ACCESS CODE: Thumb AROM  FOTO Completed On: 25    POC Expires Reeval for Medicare to be completed  Auth Expiration Date PT/OT/STVisit Limit   25 By visit   BOMN    Completed on visit                  Auth Status DATE         NA Visit # 7         Remaining         MANUAL THERAPY                  Scar mob 2m        Graston L thumb 4m        STM thenar/webspace 4m                          THERAPEUTIC EXERCISE HEP         Thumb AROM  2m        IP blocking          Wrist AROM  2m        Manipulation balls          Gripping  L1 in, L2 out lg pegs        TB thumb abd          Pinching- lat and 3 pt   Y/R marbles                  NEUROMUSCULAR REEDUCATION                                                                       THERAPEUTIC ACTIVITY          Peg   30x        opposition  30x        Coins- in hand manipulation  30x        Putty- , " roll, pinch                                                            MODALITIES         MHP L  5m        CP                       Auth Status DATE 3/31 4/7 4/15 4/23 4/28 4/30   NA Visit # 1 2 3 4 5 6    Remaining         MANUAL THERAPY IE 30'                 Scar mob 3m 3m 3m 4m 2m 2m   Graston L thumb 4m 4m 4m 4m 4m 4m   STM thenar/webspace 4m 4m 4m 4m 2m 4m                     THERAPEUTIC EXERCISE HEP Thumb AROM        Thumb AROM  2m 2m 2m 2m 2m 2m   IP blocking  1m 1m 1m 1m     Wrist AROM   2m 2m  2m 2m 2m   Manipulation balls   2m 2m 2m     Gripping   Red web   3x10 Blue web  3x10 Blue web 3x10  Blue web 3x10   TB thumb abd     1m     Pinching- lat and 3 pt       Y marbles Y marbles             NEUROMUSCULAR REEDUCATION                                                                       THERAPEUTIC ACTIVITY          Peg    30x 30x 30x 30x 30x   opposition   30x 30x 30x 30x 30x   Coins- in hand manipulation     x15 30x 30x   Putty- , roll, pinch      Tan                                                      MODALITIES         MHP L  8m 8m 8m 5m  5m 5m   CP   5m 5m

## 2025-05-07 ENCOUNTER — EVALUATION (OUTPATIENT)
Dept: OCCUPATIONAL THERAPY | Facility: CLINIC | Age: 70
End: 2025-05-07
Attending: ORTHOPAEDIC SURGERY
Payer: COMMERCIAL

## 2025-05-07 DIAGNOSIS — M18.0 PRIMARY OSTEOARTHRITIS OF BOTH FIRST CARPOMETACARPAL JOINTS: Primary | ICD-10-CM

## 2025-05-07 PROCEDURE — 97140 MANUAL THERAPY 1/> REGIONS: CPT

## 2025-05-07 PROCEDURE — 97110 THERAPEUTIC EXERCISES: CPT

## 2025-05-07 NOTE — PROGRESS NOTES
OT Re-Evaluation     Today's date: 2025  Patient name: Rohan Bledsoe  : 1955  MRN: [id number]  Referring provider: Radha Donaldson,*  Dx:   Encounter Diagnosis     ICD-10-CM    1. Primary osteoarthritis of both first carpometacarpal joints  M18.0             Start Time: 725  Stop Time: 822  Total time in clinic (min): 57 minutes    Assessment  Impairments: abnormal or restricted ROM, activity intolerance, impaired physical strength, pain with function and weight-bearing intolerance  Symptom irritability: low    Assessment details: Patient presented with increased range of motion and strength since last assessed. Range of motion is within functional limits at this time. He continues with deficits in  and pinch strength compared to non affected extremity. Patient's scar has mild adhesions.   Understanding of Dx/Px/POC: good     Prognosis: good    Goals  STG( 4 visits)   1. Compliant with HEP/splint -MET  2. Improve L thumb AROM by 5-10 degrees. -MET  LTG( 12 visits or discharge)  1. Full opposition of L thumb with function -MET  2. L  and Pinch strength WFLs for RTW. -PARTIALLY MET  3. Improve FOTO score to predicted outcome or greater. -PARTIALLY MET  4. Pain free L hand with ADLs. -PARTIALLY MET    Plan  Patient would benefit from: skilled occupational therapy and OT eval  Planned modality interventions: cryotherapy and thermotherapy: hydrocollator packs    Planned therapy interventions: activity modification, IASTM, joint mobilization, manual therapy, home exercise program, graded exercise, functional ROM exercises, fine motor coordination training, therapeutic activities, therapeutic exercise, orthotic fitting/training, strengthening and stretching    Frequency: 1-2x week  Duration in weeks: 10  Plan of Care beginning date: 2025  Plan of Care expiration date: 2025  Treatment plan discussed with: patient        Subjective Evaluation    History of Present Illness  Mechanism  "of injury: surgery  Mechanism of injury: Patient s/p L thumb CMC arthroplasty on     Patient reported difficulty with scrubbing while cleaning. 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   Quality of life: good    Patient Goals  Patient goals for therapy: decreased pain, increased motion and increased strength    Pain  Current pain ratin  At best pain ratin  At worst pain rating: 3  Location: L thumb/radial wrist  Quality: tight and discomfort  Aggravating factors: lifting  Progression: improved    Social Support  Lives with: spouse    Employment status: working ([employer])  Hand dominance: left    Treatments  Current treatment: occupational therapy        Objective     Observations     Additional Observation Details  Approximate 3cm slightly adhered incision over CMC    Neurological Testing     Sensation     Wrist/Hand   Left   Intact: light touch    Active Range of Motion     Left Wrist   Wrist flexion: 64 degrees   Wrist extension: 65 degrees   Radial deviation: 15 degrees   Ulnar deviation: 30 degrees     Left Thumb   Flexion     MP: 50 degrees    DIP: 60 degrees  Palmar Abduction     CMC: 55 degrees  Radial abduction    CMC: 50 degrees  Kapandji score: 9 degrees      Strength/Myotome Testing     Left Wrist/Hand      (2nd hand position)     Trial 1: 48    Thumb Strength  Key/Lateral Pinch     Trial 1: 8  Palmar/Three-Point Pinch     Trial 1: 5    Right Wrist/Hand      (2nd hand position)     Trial 1: 67    Thumb Strength   Key/Lateral Pinch     Trial 1: 14  Palmar/Three-Point Pinch     Trial 1: 12            Precautions: s/p L thumb Weilby   CO-MORBIDITIES:  HEP ACCESS CODE: Thumb AROM  FOTO Completed On: " 5/5/25    POC Expires Reeval for Medicare to be completed  Auth Expiration Date PT/OT/STVisit Limit   7/16/25 By visit   LISA    Completed on visit                  Auth Status DATE 5/5 5/7       NA Visit # 7 8        Remaining         MANUAL THERAPY                  Scar mob 2m 2m       Graston L thumb 4m 4m       STM thenar/webspace 4m 2m                         THERAPEUTIC EXERCISE HEP         Thumb AROM  2m 2m       IP blocking          Wrist AROM  2m 2m       Manipulation balls          Gripping  L1 in, L2 out lg pegs L2 in/out large pegs       TB thumb abd          Pinching- lat and 3 pt   Y/R marbles R lakeshia                 NEUROMUSCULAR REEDUCATION                                                                       THERAPEUTIC ACTIVITY          Peg   30x 30x       opposition  30x 30x       Coins- in hand manipulation  30x 30x       Putty- , roll, pinch                                                            MODALITIES         MHP L  5m 5m       CP                       Auth Status DATE 3/31 4/7 4/15 4/23 4/28 4/30   NA Visit # 1 2 3 4 5 6    Remaining         MANUAL THERAPY IE 30'                 Scar mob 3m 3m 3m 4m 2m 2m   Graston L thumb 4m 4m 4m 4m 4m 4m   STM thenar/webspace 4m 4m 4m 4m 2m 4m                     THERAPEUTIC EXERCISE HEP Thumb AROM        Thumb AROM  2m 2m 2m 2m 2m 2m   IP blocking  1m 1m 1m 1m     Wrist AROM   2m 2m  2m 2m 2m   Manipulation balls   2m 2m 2m     Gripping   Red web   3x10 Blue web  3x10 Blue web 3x10  Blue web 3x10   TB thumb abd     1m     Pinching- lat and 3 pt       Y marbles Y lakeshia             NEUROMUSCULAR REEDUCATION                                                                       THERAPEUTIC ACTIVITY          Peg    30x 30x 30x 30x 30x   opposition   30x 30x 30x 30x 30x   Coins- in hand manipulation     x15 30x 30x   Putty- , roll, pinch      Tan                                                      MODALITIES         MHP L  8m  8m 8m 5m  5m 5m   CP   5m 5m

## 2025-05-12 ENCOUNTER — OFFICE VISIT (OUTPATIENT)
Dept: OCCUPATIONAL THERAPY | Facility: CLINIC | Age: 70
End: 2025-05-12
Attending: ORTHOPAEDIC SURGERY
Payer: COMMERCIAL

## 2025-05-12 DIAGNOSIS — M18.0 PRIMARY OSTEOARTHRITIS OF BOTH FIRST CARPOMETACARPAL JOINTS: Primary | ICD-10-CM

## 2025-05-12 PROCEDURE — 97140 MANUAL THERAPY 1/> REGIONS: CPT

## 2025-05-12 PROCEDURE — 97110 THERAPEUTIC EXERCISES: CPT

## 2025-05-12 NOTE — PROGRESS NOTES
"Daily Note     Today's date: 2025  Patient name: Rohan Bledsoe  : 1955  MRN: [id number]  Referring provider: Radha Donaldson,*  Dx:   Encounter Diagnosis     ICD-10-CM    1. Primary osteoarthritis of both first carpometacarpal joints  M18.0               Start Time: 747  Stop Time: 823  Total time in clinic (min): 36 minutes      Subjective: \"It's feeling much better. I actually cut grass, pulled some stuff out.\" He reported that he continues to play his violin the other day and had a good  on his violin bow.       Objective: See treatment diary below      Assessment: Tolerated treatment well. Patient tolerated new strengthening exercise this date without complaints of pain. He was instructed to add resisted digit extension to home exercise program. Patient  benefiting from OT for range of motion and strengthening.      Plan: Continue per plan of care.      Precautions: s/p L thumb Weilby   CO-MORBIDITIES:  HEP ACCESS CODE: Thumb AROM  FOTO Completed On: 25    POC Expires Reeval for Medicare to be completed  Auth Expiration Date PT/OT/STVisit Limit   25 By visit   BOMN    Completed on visit                  Auth Status DATE       NA Visit # 7 8 9       Remaining         MANUAL THERAPY                  Scar mob 2m 2m 2m       Graston L thumb 4m 4m 4m      STM thenar/webspace 4m 2m 2m                        THERAPEUTIC EXERCISE HEP         Thumb AROM  2m 2m 2m      IP blocking          Wrist AROM  2m 2m 2m      Manipulation balls          Gripping  L1 in, L2 out lg pegs L2 in/out large pegs L2 in/out large pegs      TB thumb abd          Pinching- lat and 3 pt   Y/R marbles R marbles R marbles       Resisted digit ext    Yellow x15      NEUROMUSCULAR REEDUCATION                                                                       THERAPEUTIC ACTIVITY          Peg   30x 30x 30x      opposition  30x 30x 30x      Coins- in hand manipulation  30x 30x 30x    "   Putty- , roll, pinch                                                            MODALITIES         MHP L  5m 5m 5m      CP                       Auth Status DATE 3/31 4/7 4/15 4/23 4/28 4/30   NA Visit # 1 2 3 4 5 6    Remaining         MANUAL THERAPY IE 30'                 Scar mob 3m 3m 3m 4m 2m 2m   Graston L thumb 4m 4m 4m 4m 4m 4m   STM thenar/webspace 4m 4m 4m 4m 2m 4m                     THERAPEUTIC EXERCISE HEP Thumb AROM        Thumb AROM  2m 2m 2m 2m 2m 2m   IP blocking  1m 1m 1m 1m     Wrist AROM   2m 2m  2m 2m 2m   Manipulation balls   2m 2m 2m     Gripping   Red web   3x10 Blue web  3x10 Blue web 3x10  Blue web 3x10   TB thumb abd     1m     Pinching- lat and 3 pt       Y marbles Y marbles             NEUROMUSCULAR REEDUCATION                                                                       THERAPEUTIC ACTIVITY          Peg    30x 30x 30x 30x 30x   opposition   30x 30x 30x 30x 30x   Coins- in hand manipulation     x15 30x 30x   Putty- , roll, pinch      Tan                                                      MODALITIES         MHP L  8m 8m 8m 5m  5m 5m   CP   5m 5m

## 2025-05-14 ENCOUNTER — APPOINTMENT (OUTPATIENT)
Dept: OCCUPATIONAL THERAPY | Facility: CLINIC | Age: 70
End: 2025-05-14
Attending: ORTHOPAEDIC SURGERY
Payer: COMMERCIAL

## 2025-05-16 DIAGNOSIS — I49.3 FREQUENT PVCS: ICD-10-CM

## 2025-05-16 RX ORDER — METOPROLOL SUCCINATE 50 MG/1
50 TABLET, EXTENDED RELEASE ORAL DAILY
Qty: 90 TABLET | Refills: 1 | Status: SHIPPED | OUTPATIENT
Start: 2025-05-16

## 2025-05-19 ENCOUNTER — OFFICE VISIT (OUTPATIENT)
Dept: OCCUPATIONAL THERAPY | Facility: CLINIC | Age: 70
End: 2025-05-19
Attending: ORTHOPAEDIC SURGERY
Payer: COMMERCIAL

## 2025-05-19 DIAGNOSIS — M18.0 PRIMARY OSTEOARTHRITIS OF BOTH FIRST CARPOMETACARPAL JOINTS: Primary | ICD-10-CM

## 2025-05-19 PROCEDURE — 97140 MANUAL THERAPY 1/> REGIONS: CPT | Performed by: OCCUPATIONAL THERAPIST

## 2025-05-19 PROCEDURE — 97110 THERAPEUTIC EXERCISES: CPT | Performed by: OCCUPATIONAL THERAPIST

## 2025-05-19 NOTE — PROGRESS NOTES
Daily Note     Today's date: 2025  Patient name: Rohan Bledsoe  : 1955  MRN: [id number]  Referring provider: Radha Donaldson,*  Dx:   Encounter Diagnosis     ICD-10-CM    1. Primary osteoarthritis of both first carpometacarpal joints  M18.0                            Subjective: No new complaints.    Objective: See treatment diary below      Assessment: Tolerated treatment well. Patient progressing well with functional strengthening.  No new complaints with tx.    Plan: Continue per plan of care.      Precautions: s/p L thumb Weilby   CO-MORBIDITIES:  HEP ACCESS CODE: Thumb AROM  FOTO Completed On: 25    POC Expires Reeval for Medicare to be completed  Auth Expiration Date PT/OT/STVisit Limit   25 By visit   BOMN    Completed on visit                  Auth Status DATE      NA Visit # 7 8 9 10      Remaining         MANUAL THERAPY                  Scar mob 2m 2m 2m  2m     Graston L thumb 4m 4m 4m 4m     STM thenar/webspace 4m 2m 2m 2m                       THERAPEUTIC EXERCISE HEP         Thumb AROM  2m 2m 2m 2m     IP blocking          Wrist AROM  2m 2m 2m 2m     Manipulation balls          Gripping  L1 in, L2 out lg pegs L2 in/out large pegs L2 in/out large pegs L2 in/out     TB thumb abd          Pinching- lat and 3 pt   Y/R marbles R marbles R marbles  G     Resisted digit ext    Yellow x15 Y 3x10     NEUROMUSCULAR REEDUCATION                                                                       THERAPEUTIC ACTIVITY          Peg   30x 30x 30x      opposition  30x 30x 30x      Coins- in hand manipulation  30x 30x 30x      Putty- , roll, pinch                                                            MODALITIES         MHP L  5m 5m 5m 5m     CP                       Auth Status DATE 3/31 4/7 4/15 4/23 4/28 4/30   NA Visit # 1 2 3 4 5 6    Remaining         MANUAL THERAPY IE 30'                 Scar mob 3m 3m 3m 4m 2m 2m   Graston L thumb 4m 4m 4m 4m 4m  4m   STM thenar/webspace 4m 4m 4m 4m 2m 4m                     THERAPEUTIC EXERCISE HEP Thumb AROM        Thumb AROM  2m 2m 2m 2m 2m 2m   IP blocking  1m 1m 1m 1m     Wrist AROM   2m 2m  2m 2m 2m   Manipulation balls   2m 2m 2m     Gripping   Red web   3x10 Blue web  3x10 Blue web 3x10  Blue web 3x10   TB thumb abd     1m     Pinching- lat and 3 pt       Y marbles Y marbles             NEUROMUSCULAR REEDUCATION                                                                       THERAPEUTIC ACTIVITY          Peg    30x 30x 30x 30x 30x   opposition   30x 30x 30x 30x 30x   Coins- in hand manipulation     x15 30x 30x   Putty- , roll, pinch      Tan                                                      MODALITIES         MHP L  8m 8m 8m 5m  5m 5m   CP   5m 5m

## 2025-05-21 ENCOUNTER — OFFICE VISIT (OUTPATIENT)
Dept: OCCUPATIONAL THERAPY | Facility: CLINIC | Age: 70
End: 2025-05-21
Attending: ORTHOPAEDIC SURGERY
Payer: COMMERCIAL

## 2025-05-21 DIAGNOSIS — M18.0 PRIMARY OSTEOARTHRITIS OF BOTH FIRST CARPOMETACARPAL JOINTS: Primary | ICD-10-CM

## 2025-05-21 PROCEDURE — 97110 THERAPEUTIC EXERCISES: CPT

## 2025-05-21 PROCEDURE — 97140 MANUAL THERAPY 1/> REGIONS: CPT

## 2025-05-21 NOTE — PROGRESS NOTES
Daily Note     Today's date: 2025  Patient name: Rohan Bledsoe  : 1955  MRN: [id number]  Referring provider: Radha Donaldson,*  Dx:   Encounter Diagnosis     ICD-10-CM    1. 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       Objective: See treatment diary below      Assessment: Tolerated treatment well. Provided increased resistance for putty home strengthening program. Patient tolerated increased resistance for strengthening exercise/activity this date without complaints of pain. Patient seeing referring physician on 25.       Plan: Continue per plan of care.        Precautions: s/p L thumb Weilby 25  CO-MORBIDITIES:  HEP ACCESS CODE: Thumb AROM  FOTO Completed On: 25    POC Expires Reeval for Medicare to be completed  Auth Expiration Date PT/OT/STVisit Limit   25 By visit   BOMN    Completed on visit                  Auth Status DATE     NA Visit # 7 8 9 10 11     Remaining         MANUAL THERAPY                  Scar mob 2m 2m 2m  2m 2m    Graston L thumb 4m 4m 4m 4m 4m    STM thenar/webspace 4m 2m 2m 2m 2m                      THERAPEUTIC EXERCISE HEP         Thumb AROM  2m 2m 2m 2m 2m    IP blocking          Wrist AROM  2m 2m 2m 2m 2m    Manipulation balls          Gripping  L1 in, L2 out lg pegs L2 in/out large pegs L2 in/out large pegs L2 in/out L2 in/out    TB thumb abd          Pinching- lat and 3 pt   Y/R marbles R marbles R marbles  G marbles  B marbles    Resisted digit ext    Yellow x15 Y 3x10 Y 3x10    NEUROMUSCULAR REEDUCATION                                                                       THERAPEUTIC ACTIVITY          Peg   30x 30x 30x      opposition  30x 30x 30x      Coins- in hand  manipulation  30x 30x 30x  30x    Putty- , roll, pinch      Remove pegs- Y                                                      MODALITIES         MHP L  5m 5m 5m 5m 5m    CP                       Auth Status DATE 3/31 4/7 4/15 4/23 4/28 4/30   NA Visit # 1 2 3 4 5 6    Remaining         MANUAL THERAPY IE 30'                 Scar mob 3m 3m 3m 4m 2m 2m   Graston L thumb 4m 4m 4m 4m 4m 4m   STM thenar/webspace 4m 4m 4m 4m 2m 4m                     THERAPEUTIC EXERCISE HEP Thumb AROM        Thumb AROM  2m 2m 2m 2m 2m 2m   IP blocking  1m 1m 1m 1m     Wrist AROM   2m 2m  2m 2m 2m   Manipulation balls   2m 2m 2m     Gripping   Red web   3x10 Blue web  3x10 Blue web 3x10  Blue web 3x10   TB thumb abd     1m     Pinching- lat and 3 pt       Y marbles Y marbles             NEUROMUSCULAR REEDUCATION                                                                       THERAPEUTIC ACTIVITY          Peg    30x 30x 30x 30x 30x   opposition   30x 30x 30x 30x 30x   Coins- in hand manipulation     x15 30x 30x   Putty- , roll, pinch      Tan                                                      MODALITIES         MHP L  8m 8m 8m 5m  5m 5m   CP   5m 5m                       Detail Level: Detailed Render Post-Care Instructions In Note?: yes Post-Care Instructions: I reviewed with the patient in detail post-care instructions. Patient is to wear sunprotection, and avoid picking at any of the treated lesions. Pt may apply Vaseline to crusted or scabbing areas. Duration Of Freeze Thaw-Cycle (Seconds): 0 Consent: The patient's consent was obtained including but not limited to risks of crusting, scabbing, blistering, scarring, darker or lighter pigmentary change, recurrence, incomplete removal and infection. Render Note In Bullet Format When Appropriate: No

## 2025-05-27 ENCOUNTER — OFFICE VISIT (OUTPATIENT)
Dept: OBGYN CLINIC | Facility: CLINIC | Age: 70
End: 2025-05-27
Payer: COMMERCIAL

## 2025-05-27 VITALS — BODY MASS INDEX: 28.16 KG/M2 | WEIGHT: 169 LBS | HEIGHT: 65 IN

## 2025-05-27 DIAGNOSIS — M18.0 PRIMARY OSTEOARTHRITIS OF BOTH FIRST CARPOMETACARPAL JOINTS: Primary | ICD-10-CM

## 2025-05-27 PROCEDURE — 99213 OFFICE O/P EST LOW 20 MIN: CPT | Performed by: ORTHOPAEDIC SURGERY

## 2025-05-27 NOTE — ASSESSMENT & PLAN NOTE
Patient is approximately 3 months status post left thumb CMC joint arthroplasty with suture sling technique, DOS 2/18/2025.  Patient overall is presenting well at today's visit.  Patient may continue hand therapy and transition into [facility] when comfortable.  Patient may slowly return to activity as tolerated.  Patient is also experiencing advanced osteoarthritis of right CMC joint.  Patient will follow-up on as-needed basis once patient would like to discuss surgical intervention for right thumb CMC joint arthroplasty.

## 2025-05-27 NOTE — PROGRESS NOTES
"    ORTHOPAEDIC HAND, WRIST, AND ELBOW OFFICE  VISIT     Name: Rohan Bledsoe      : 1955      MRN: [id number]  Encounter Provider: Radha Donaldson MD  Encounter Date: 2025   Encounter department: [facility]  :  Assessment & Plan  Primary osteoarthritis of both first carpometacarpal joints  Patient is approximately 3 months status post left thumb CMC joint arthroplasty with suture sling technique, DOS 2025.  Patient overall is presenting well at today's visit.  Patient may continue hand therapy and transition into HEP when comfortable.  Patient may slowly return to activity as tolerated.  Patient is also experiencing advanced osteoarthritis of right CMC joint.  Patient will follow-up on as-needed basis once patient would like to discuss surgical intervention for right thumb CMC joint arthroplasty.           History of Present Illness   HPI  Chief Complaint   Patient presents with    Left Thumb - Post-op       SUBJECTIVE:  Rohan Bledsoe is a 69 y.o. male who presents for follow up after Arthroplasty Thumb Weilby - Left on 2025.  Today patient has minimal complaints at today's visit.  Patient states he is having minimal pain within his thumb and states he has been able to continue with hand therapy with a focus on strengthening.  Patient states he continues to see improvement each day and is overall pleased..       PHYSICAL EXAMINATION:  Vital signs: Ht 5' 5\" (1.651 m)   Wt 76.7 kg (169 lb)   BMI 28.12 kg/m²   General: well developed and well nourished, alert, oriented times 3, and appears comfortable  Psychiatric: Normal    MUSCULOSKELETAL EXAMINATION:  Incision: healed  Range of Motion: As expected near full  strength  Neurovascular status: Neuro intact, good cap refill  Activity Restrictions: No restrictions         STUDIES REVIEWED:  No Studies to review      PROCEDURES PERFORMED:  Procedures  No Procedures performed today    "

## 2025-05-28 ENCOUNTER — APPOINTMENT (OUTPATIENT)
Dept: OCCUPATIONAL THERAPY | Facility: CLINIC | Age: 70
End: 2025-05-28
Attending: ORTHOPAEDIC SURGERY
Payer: COMMERCIAL

## 2025-06-02 ENCOUNTER — APPOINTMENT (OUTPATIENT)
Dept: OCCUPATIONAL THERAPY | Facility: CLINIC | Age: 70
End: 2025-06-02
Attending: ORTHOPAEDIC SURGERY
Payer: COMMERCIAL

## 2025-06-04 ENCOUNTER — OFFICE VISIT (OUTPATIENT)
Dept: OCCUPATIONAL THERAPY | Facility: CLINIC | Age: 70
End: 2025-06-04
Attending: ORTHOPAEDIC SURGERY
Payer: COMMERCIAL

## 2025-06-04 DIAGNOSIS — M18.0 PRIMARY OSTEOARTHRITIS OF BOTH FIRST CARPOMETACARPAL JOINTS: Primary | ICD-10-CM

## 2025-06-04 PROCEDURE — 97110 THERAPEUTIC EXERCISES: CPT

## 2025-06-04 NOTE — PROGRESS NOTES
"Daily Note     Today's date: 2025  Patient name: Rohan Bledsoe  : 1955  MRN: [id number]  Referring provider: Radha Donaldson,*  Dx:   Encounter Diagnosis     ICD-10-CM    1. Primary osteoarthritis of both first carpometacarpal joints  M18.0             Start Time: 0718  Stop Time: 0800  Total time in clinic (min): 42 minutes      Subjective: Patient reported some discomfort utilizing key pinch with his key fob. \"I feel it a lot less than I used to.\"       Objective: See treatment diary below      Assessment: Tolerated treatment well. Patient saw referring physician on . She lifted restrictions and recommended continuing therapy and weaning to HEP when ready. Patient tolerated new strengthening exercises this date with some noted difficulty.       Plan: Continue per plan of care.        Precautions: s/p L thumb Weilby 25  CO-MORBIDITIES:  HEP ACCESS CODE: Thumb AROM  FOTO Completed On: 25    POC Expires Reeval for Medicare to be completed  Auth Expiration Date PT/OT/ST Visit Limit   25 By visit   BOMN    Completed on visit                  Auth Status DATE    NA Visit # 7 8 9 10 11 12    Remaining         MANUAL THERAPY                  Scar mob 2m 2m 2m  2m 2m    Graston L thumb 4m 4m 4m 4m 4m    STM thenar/webspace 4m 2m 2m 2m 2m                      THERAPEUTIC EXERCISE HEP         Thumb AROM  2m 2m 2m 2m 2m 2m   IP blocking          Wrist AROM  2m 2m 2m 2m 2m 2m   Manipulation balls          Gripping  L1 in, L2 out lg pegs L2 in/out large pegs L2 in/out large pegs L2 in/out L2 in/out L2 in/out   TB thumb abd          Pinching- lat and 3 pt   Y/R marbles R marbles R marbles  G marbles  B marbles B marbles   Resisted digit ext    Yellow x15 Y 3x10 Y 3x10 Y 3x10   Wrist PREs       1 lb 2x10                       NEUROMUSCULAR REEDUCATION                                                                       THERAPEUTIC ACTIVITY          Peg pick " up  30x 30x 30x      opposition  30x 30x 30x      Coins- in hand manipulation  30x 30x 30x  30x    Putty- , roll, pinch      Remove pegs- Y Remove pegs- Y   Flexbar stability       Y on table F/B/S/S                                           MODALITIES         MHP L  5m 5m 5m 5m 5m    CP                       Auth Status DATE 3/31 4/7 4/15 4/23 4/28 4/30   NA Visit # 1 2 3 4 5 6    Remaining         MANUAL THERAPY IE 30'                 Scar mob 3m 3m 3m 4m 2m 2m   Graston L thumb 4m 4m 4m 4m 4m 4m   STM thenar/webspace 4m 4m 4m 4m 2m 4m                     THERAPEUTIC EXERCISE HEP Thumb AROM        Thumb AROM  2m 2m 2m 2m 2m 2m   IP blocking  1m 1m 1m 1m     Wrist AROM   2m 2m  2m 2m 2m   Manipulation balls   2m 2m 2m     Gripping   Red web   3x10 Blue web  3x10 Blue web 3x10  Blue web 3x10   TB thumb abd     1m     Pinching- lat and 3 pt       Y marbles Y marbles             NEUROMUSCULAR REEDUCATION                                                                       THERAPEUTIC ACTIVITY          Peg    30x 30x 30x 30x 30x   opposition   30x 30x 30x 30x 30x   Coins- in hand manipulation     x15 30x 30x   Putty- , roll, pinch      Tan                                                      MODALITIES         MHP L  8m 8m 8m 5m  5m 5m   CP   5m 5m

## 2025-06-09 ENCOUNTER — OFFICE VISIT (OUTPATIENT)
Dept: OCCUPATIONAL THERAPY | Facility: CLINIC | Age: 70
End: 2025-06-09
Attending: ORTHOPAEDIC SURGERY
Payer: COMMERCIAL

## 2025-06-09 DIAGNOSIS — M18.0 PRIMARY OSTEOARTHRITIS OF BOTH FIRST CARPOMETACARPAL JOINTS: Primary | ICD-10-CM

## 2025-06-09 PROCEDURE — 97110 THERAPEUTIC EXERCISES: CPT

## 2025-06-09 NOTE — PROGRESS NOTES
"Daily Note     Today's date: 2025  Patient name: Rohan Bledsoe  : 1955  MRN: [id number]  Referring provider: Radha Donaldson,*  Dx:   Encounter Diagnosis     ICD-10-CM    1. Primary osteoarthritis of both first carpometacarpal joints  M18.0             Start Time: 0725  Stop Time: 0802  Total time in clinic (min): 37 minutes      Subjective: \"It's come a long way.\" Patient reported noticing a difference after incorporating wrist flexor/extensor stretch.       Objective: See treatment diary below      Assessment: Tolerated treatment well. Patient tolerated increased resistance with strengthening exercise without complaints of pain. Discussed potential discharge next visit.       Plan: Potential discharge next visit.       Precautions: s/p L thumb [place] 25  CO-MORBIDITIES:  HEP ACCESS CODE: Thumb AROM  FOTO Completed On: 25    POC Expires Reeval for Medicare to be completed  Auth Expiration Date PT/OT/ST Visit Limit   25 By visit   BOMN    Completed on visit                Auth Status DATE         NA Visit # 13         Remaining         MANUAL THERAPY                                                               THERAPEUTIC EXERCISE HEP         Wrist flexor/extensor  10\" x3        Thumb PROM  2m        Wrist PROM  2m        Gripping  L 2 in/out        Pinching- lat and 3 pt   Bmarbles        Resisted digit ext  Y 3x10        Wrist PREs  2 lb 2x10                            NEUROMUSCULAR REEDUCATION                                                                       THERAPEUTIC ACTIVITY          Putty- , roll, pinch  Remove pegs- Y        Flexbar stability  Y on table F/B/S/S                                                MODALITIES         MHP L  5m        CP                       Auth Status DATE  6   NA Visit # 7 8 9 10 11 12    Remaining         MANUAL THERAPY                  Scar mob 2m 2m 2m  2m 2m    Graston L thumb 4m 4m 4m 4m 4m    STM " thenar/webspace 4m 2m 2m 2m 2m                      THERAPEUTIC EXERCISE HEP         Thumb AROM  2m 2m 2m 2m 2m 2m   IP blocking          Wrist AROM  2m 2m 2m 2m 2m 2m   Manipulation balls          Gripping  L1 in, L2 out lg pegs L2 in/out large pegs L2 in/out large pegs L2 in/out L2 in/out L2 in/out   TB thumb abd          Pinching- lat and 3 pt   Y/R marbles R marbles R marbles  G marbles  B marbles B marbles   Resisted digit ext    Yellow x15 Y 3x10 Y 3x10 Y 3x10   Wrist PREs       1 lb 2x10                       NEUROMUSCULAR REEDUCATION                                                                       THERAPEUTIC ACTIVITY          Peg   30x 30x 30x      opposition  30x 30x 30x      Coins- in hand manipulation  30x 30x 30x  30x    Putty- , roll, pinch      Remove pegs- Y Remove pegs- Y   Flexbar stability       Y on table F/B/S/S                                           MODALITIES         MHP L  5m 5m 5m 5m 5m    CP                       Auth Status DATE 3/31 4/7 4/15 4/23 4/28 4/30   NA Visit # 1 2 3 4 5 6    Remaining         MANUAL THERAPY IE 30'                 Scar mob 3m 3m 3m 4m 2m 2m   Graston L thumb 4m 4m 4m 4m 4m 4m   STM thenar/webspace 4m 4m 4m 4m 2m 4m                     THERAPEUTIC EXERCISE HEP Thumb AROM        Thumb AROM  2m 2m 2m 2m 2m 2m   IP blocking  1m 1m 1m 1m     Wrist AROM   2m 2m  2m 2m 2m   Manipulation balls   2m 2m 2m     Gripping   Red web   3x10 Blue web  3x10 Blue web 3x10  Blue web 3x10   TB thumb abd     1m     Pinching- lat and 3 pt       Y marbles Y marbles             NEUROMUSCULAR REEDUCATION                                                                       THERAPEUTIC ACTIVITY          Peg    30x 30x 30x 30x 30x   opposition   30x 30x 30x 30x 30x   Coins- in hand manipulation     x15 30x 30x   Putty- , roll, pinch      Tan                                                      MODALITIES         MHP L  8m 8m 8m 5m  5m 5m   CP   5m 5m

## 2025-06-11 ENCOUNTER — OFFICE VISIT (OUTPATIENT)
Dept: OCCUPATIONAL THERAPY | Facility: CLINIC | Age: 70
End: 2025-06-11
Attending: ORTHOPAEDIC SURGERY
Payer: COMMERCIAL

## 2025-06-11 DIAGNOSIS — M18.0 PRIMARY OSTEOARTHRITIS OF BOTH FIRST CARPOMETACARPAL JOINTS: Primary | ICD-10-CM

## 2025-06-11 PROCEDURE — 97110 THERAPEUTIC EXERCISES: CPT

## 2025-06-11 NOTE — PROGRESS NOTES
"Daily Note/Discharge    Today's date: 2025  Patient name: Rohan Bledsoe  : 1955  MRN: [id number]  Referring provider: Radha Donaldson,*  Dx:   Encounter Diagnosis     ICD-10-CM    1. Primary osteoarthritis of both first carpometacarpal joints  M18.0               Start Time: 0720  Stop Time: 0800  Total time in clinic (min): 40 minutes      Subjective: Patient offered no change in function      Objective: See treatment diary below  Kapandji score: 9   : 50 lbs  Lat pinch: 12 lbs   3 pt pinch: 10 lbs         Assessment: Tolerated treatment well. Patient tolerated increased resistance with strengthening exercise without complaints of pain. Reviewed final discharge home exercise program. Patient was in agreement with discharge. Patient met all his goals. Patient was informed to call if he has any questions or concerns.      Plan: Discharge to home exercise program       Precautions: s/p L thumb [place] 25  CO-MORBIDITIES:  HEP ACCESS CODE: Thumb AROM  FOTO Completed On: 25    POC Expires Reeval for Medicare to be completed  Auth Expiration Date PT/OT/ST Visit Limit   25 By visit   BOMN    Completed on visit                Auth Status DATE        NA Visit # 13 14        Remaining         MANUAL THERAPY                                                               THERAPEUTIC EXERCISE HEP         Wrist flexor/extensor  10\" x3        Thumb PROM  2m 2m       Wrist PROM  2m 2m       Gripping  L 2 in/out L2 in/out       Pinching- lat and 3 pt   B marbles B marbles       Resisted digit ext  Y 3x10 Y 3x10       Wrist PREs  2 lb 2x10 2 lb 2x10                           NEUROMUSCULAR REEDUCATION                                                                       THERAPEUTIC ACTIVITY          Putty- , roll, pinch  Remove pegs- Y Remove pegs- Y       Flexbar stability  Y on table F/B/S/S R on table F/B/S/S                                               MODALITIES       "   MHP L  5m 5m       CP                       Auth Status DATE 5/5 5/7 5/12 5/19 5/21 6/4   NA Visit # 7 8 9 10 11 12    Remaining         MANUAL THERAPY                  Scar mob 2m 2m 2m  2m 2m    Graston L thumb 4m 4m 4m 4m 4m    STM thenar/webspace 4m 2m 2m 2m 2m                      THERAPEUTIC EXERCISE HEP         Thumb AROM  2m 2m 2m 2m 2m 2m   IP blocking          Wrist AROM  2m 2m 2m 2m 2m 2m   Manipulation balls          Gripping  L1 in, L2 out lg pegs L2 in/out large pegs L2 in/out large pegs L2 in/out L2 in/out L2 in/out   TB thumb abd          Pinching- lat and 3 pt   Y/R marbles R marbles R marbles  G marbles  B marbles B marbles   Resisted digit ext    Yellow x15 Y 3x10 Y 3x10 Y 3x10   Wrist PREs       1 lb 2x10                       NEUROMUSCULAR REEDUCATION                                                                       THERAPEUTIC ACTIVITY          Peg   30x 30x 30x      opposition  30x 30x 30x      Coins- in hand manipulation  30x 30x 30x  30x    Putty- , roll, pinch      Remove pegs- Y Remove pegs- Y   Flexbar stability       Y on table F/B/S/S                                           MODALITIES         MHP L  5m 5m 5m 5m 5m    CP                       Auth Status DATE 3/31 4/7 4/15 4/23 4/28 4/30   NA Visit # 1 2 3 4 5 6    Remaining         MANUAL THERAPY IE 30'                 Scar mob 3m 3m 3m 4m 2m 2m   Graston L thumb 4m 4m 4m 4m 4m 4m   STM thenar/webspace 4m 4m 4m 4m 2m 4m                     THERAPEUTIC EXERCISE HEP Thumb AROM        Thumb AROM  2m 2m 2m 2m 2m 2m   IP blocking  1m 1m 1m 1m     Wrist AROM   2m 2m  2m 2m 2m   Manipulation balls   2m 2m 2m     Gripping   Red web   3x10 Blue web  3x10 Blue web 3x10  Blue web 3x10   TB thumb abd     1m     Pinching- lat and 3 pt       Y marbles Y marbles             NEUROMUSCULAR REEDUCATION                                                                       THERAPEUTIC ACTIVITY          Peg    30x 30x 30x 30x 30x    opposition   30x 30x 30x 30x 30x   Coins- in hand manipulation     x15 30x 30x   Putty- , roll, pinch      Tan                                                      MODALITIES         MHP L  8m 8m 8m 5m  5m 5m   CP   5m 5m

## 2025-06-26 ENCOUNTER — APPOINTMENT (OUTPATIENT)
Dept: LAB | Facility: CLINIC | Age: 70
End: 2025-06-26

## 2025-06-26 ENCOUNTER — OFFICE VISIT (OUTPATIENT)
Dept: FAMILY MEDICINE CLINIC | Facility: CLINIC | Age: 70
End: 2025-06-26
Payer: COMMERCIAL

## 2025-06-26 ENCOUNTER — TRANSCRIBE ORDERS (OUTPATIENT)
Dept: LAB | Facility: CLINIC | Age: 70
End: 2025-06-26

## 2025-06-26 VITALS
HEART RATE: 55 BPM | DIASTOLIC BLOOD PRESSURE: 78 MMHG | WEIGHT: 167.6 LBS | HEIGHT: 65 IN | BODY MASS INDEX: 27.92 KG/M2 | OXYGEN SATURATION: 98 % | TEMPERATURE: 98 F | SYSTOLIC BLOOD PRESSURE: 124 MMHG | RESPIRATION RATE: 22 BRPM

## 2025-06-26 DIAGNOSIS — M54.6 CHRONIC LEFT-SIDED THORACIC BACK PAIN: Primary | ICD-10-CM

## 2025-06-26 DIAGNOSIS — Z00.8 HEALTH EXAMINATION IN POPULATION SURVEYS: ICD-10-CM

## 2025-06-26 DIAGNOSIS — G89.29 CHRONIC LEFT-SIDED THORACIC BACK PAIN: Primary | ICD-10-CM

## 2025-06-26 DIAGNOSIS — Z00.8 HEALTH EXAMINATION IN POPULATION SURVEYS: Primary | ICD-10-CM

## 2025-06-26 LAB
CHOLEST SERPL-MCNC: 167 MG/DL (ref ?–200)
EST. AVERAGE GLUCOSE BLD GHB EST-MCNC: 103 MG/DL
HBA1C MFR BLD: 5.2 %
HDLC SERPL-MCNC: 39 MG/DL
LDLC SERPL CALC-MCNC: 78 MG/DL (ref 0–100)
NONHDLC SERPL-MCNC: 128 MG/DL
TRIGL SERPL-MCNC: 251 MG/DL (ref ?–150)

## 2025-06-26 PROCEDURE — 80061 LIPID PANEL: CPT

## 2025-06-26 PROCEDURE — 99213 OFFICE O/P EST LOW 20 MIN: CPT | Performed by: FAMILY MEDICINE

## 2025-06-26 PROCEDURE — 83036 HEMOGLOBIN GLYCOSYLATED A1C: CPT

## 2025-06-26 PROCEDURE — 36415 COLL VENOUS BLD VENIPUNCTURE: CPT

## 2025-06-26 NOTE — PROGRESS NOTES
FAMILY PRACTICE OFFICE VISIT       NAME: Rohan Bledsoe  AGE: 69 y.o. SEX: male       : 1955        MRN: [id number]    DATE: 2025  TIME: 1:02 PM    Assessment and Plan     Problem List Items Addressed This Visit       Chronic left-sided thoracic back pain - Primary    Back pain.  Patient with chronic but intermittent left back pain.  Seen at symptoms only last a few days after taking Tylenol and are infrequent throughout the year I suspect he has a muscular etiology to his symptoms.  He will try to use a cart to carry his supplies needed for work throughout the day rather than using a backpack which may be exacerbating his symptoms.                Chief Complaint     Chief Complaint   Patient presents with    Shoulder Pain     Intermittent pain in the left shoulder, neck and sometimes chest.       History of Present Illness     Patient in the office with approximately 3-year history of intermittent discomfort of his left shoulder blade and back area which radiates to his left chest and sometimes his left shoulder.  He states he may experience this 3-4 times a year and symptoms are usually short-lived for 1 to 2 days after he takes Tylenol.  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  He denies any paresthesias of his upper extremities.    Shoulder Pain         Review of Systems   Review of Systems   Constitutional: Negative.    Respiratory: Negative.     Cardiovascular: Negative.    Musculoskeletal:  Positive for arthralgias and back pain. Negative for neck pain and neck stiffness.       Active Problem List   Problem List[1]    Past Medical History:  Past Medical History[2]    Past Surgical History:  Past Surgical History[3]    Family History:  Family History[4]    Social History:  Social History     Socioeconomic History    Marital status: /Civil Union     Spouse name: Not on file    Number of children: Not on file     Years of education: Not on file    Highest education level: Not on file   Occupational History    Occupation: fulltime   Tobacco Use    Smoking status: Never    Smokeless tobacco: Never    Tobacco comments:     Former   Vaping Use    Vaping status: Never Used   Substance and Sexual Activity    Alcohol use: Yes     Alcohol/week: 6.0 standard drinks of alcohol     Types: 6 Glasses of wine per week     Comment: one with dinner    Drug use: No    Sexual activity: Yes     Partners: Female     Birth control/protection: Surgical, None     Comment: Vasectomy   Other Topics Concern    Not on file   Social History Narrative    Not on file     Social Drivers of Health     Financial Resource Strain: Not on file   Food Insecurity: Not on file   Transportation Needs: Not on file   Physical Activity: Not on file   Stress: Not on file   Social Connections: Not on file   Intimate Partner Violence: Not on file   Housing Stability: Not on file       Objective     Vitals:    06/26/25 1128   BP: 124/78   Pulse: 55   Resp: 22   Temp: 98 °F (36.7 °C)   SpO2: 98%     Wt Readings from Last 3 Encounters:   06/26/25 76 kg (167 lb 9.6 oz)   05/27/25 76.7 kg (169 lb)   04/15/25 77.6 kg (171 lb)       Physical Exam  Constitutional:       General: He is not in acute distress.     Appearance: Normal appearance. He is not ill-appearing.   HENT:      Head: Normocephalic and atraumatic.     Eyes:      General:         Right eye: No discharge.         Left eye: No discharge.      Extraocular Movements: Extraocular movements intact.      Conjunctiva/sclera: Conjunctivae normal.      Pupils: Pupils are equal, round, and reactive to light.     Neck:      Vascular: No carotid bruit.     Cardiovascular:      Rate and Rhythm: Normal rate and regular rhythm.      Heart sounds: Normal heart sounds. No murmur heard.  Pulmonary:      Effort: Pulmonary effort is normal.      Breath sounds: Normal breath sounds. No wheezing, rhonchi or rales.   Abdominal:       "General: Bowel sounds are normal.     Musculoskeletal:      Right lower leg: No edema.      Left lower leg: No edema.      Comments: Normal range of motion of left arm.  Muscle strength +5/5.   Lymphadenopathy:      Cervical: No cervical adenopathy.     Skin:     Findings: No rash.     Neurological:      General: No focal deficit present.      Mental Status: He is alert and oriented to person, place, and time.      Cranial Nerves: No cranial nerve deficit.     Psychiatric:         Mood and Affect: Mood normal.         Behavior: Behavior normal.         Thought Content: Thought content normal.         Judgment: Judgment normal.         Pertinent Laboratory/Diagnostic Studies:  Lab Results   Component Value Date    GLUCOSE 99 06/28/2017    BUN 18 09/13/2024    CREATININE 0.68 09/13/2024    CALCIUM 9.1 09/13/2024     06/28/2017    K 3.8 09/13/2024    CO2 22 09/13/2024     09/13/2024     Lab Results   Component Value Date    ALT 15 09/06/2023    AST 18 09/06/2023    ALKPHOS 49 09/06/2023    BILITOT 0.4 06/28/2017       Lab Results   Component Value Date    WBC 7.06 09/13/2024    HGB 14.1 09/13/2024    HCT 41.4 09/13/2024    MCV 92 09/13/2024     09/13/2024       No results found for: \"TSH\"    Lab Results   Component Value Date    CHOL 175 06/28/2017     Lab Results   Component Value Date    TRIG 78 06/12/2024     Lab Results   Component Value Date    HDL 46 06/12/2024     Lab Results   Component Value Date    LDLCALC 81 06/12/2024     Lab Results   Component Value Date    HGBA1C 5.4 06/12/2024       Results for orders placed or performed during the hospital encounter of 09/19/24   Echo complete w/ contrast if indicated   Result Value Ref Range    BSA 1.8 m2    A4C EF 46 %    LVIDd 5.20 cm    LVIDS 4.10 cm    IVSd 0.80 cm    LVPWd 1.00 cm    FS 21 28 - 44    MV E' Tissue Velocity Septal 8 cm/s    LA Volume Index (BP) 40.6 mL/m2    E/A ratio 0.94     E wave deceleration time 199 ms    MV Peak E Guido 77 " cm/s    MV Peak A Guido 0.82 m/s    RVID d 3.4 cm    Tricuspid annular plane systolic excursion 2.30 cm    LA size 4.1 cm    LA length (A2C) 5.90 cm    LA volume (BP) 73 mL    RA 2D Volume 59.0 mL    RAA A4C 19.5 cm2    MV stenosis pressure 1/2 time 58 ms    MV valve area p 1/2 method 3.79     TR Peak Guido 2.2 m/s    Triscuspid Valve Regurgitation Peak Gradient 19.0 mmHg    Ao root 3.00 cm    Asc Ao 3.4 cm    Tricuspid valve peak regurgitation velocity 2.18 m/s    Left ventricular stroke volume (2D) 53.00 mL    IVS 0.8 cm    LEFT VENTRICLE SYSTOLIC VOLUME (MOD BIPLANE) 2D 74 mL    LV DIASTOLIC VOLUME (MOD BIPLANE) 2D 127 mL    Left Atrium Area-systolic Four Chamber 21.9 cm2    Left Atrium Area-systolic Apical Two Chamber 24.4 cm2    LVSV, 2D 53 mL    LV EF 45        No orders of the defined types were placed in this encounter.      ALLERGIES:  Allergies[5]    Current Medications   Current Medications[6]      Health Maintenance     Health Maintenance   Topic Date Due    RSV Vaccine for Pregnant Patients and Patients Age 60+ Years (1 - Risk 60-74 years 1-dose series) Never done    Pneumococcal Vaccine: 50+ Years (2 of 2 - PCV) 11/03/2021    Annual Physical  08/28/2024    COVID-19 Vaccine (5 - 2024-25 season) 09/01/2024    Influenza Vaccine (Season Ended) 09/01/2025    Fall Risk  06/26/2026    Depression Screening  06/26/2026    Colorectal Cancer Screening  05/09/2027    DTaP,Tdap,and Td Vaccines (2 - Td or Tdap) 11/27/2028    Hepatitis C Screening  Completed    Zoster Vaccine  Completed    Meningococcal B Vaccine  Aged Out    RSV Vaccine age 0-20 Months  Aged Out    HIB Vaccine  Aged Out    IPV Vaccine  Aged Out    Hepatitis A Vaccine  Aged Out    Meningococcal ACWY Vaccine  Aged Out    HPV Vaccine  Aged Out     Immunization History   Administered Date(s) Administered    COVID-19 MODERNA VACC 0.25 ML IM BOOSTER 12/07/2021    COVID-19 MODERNA VACC 0.5 ML IM 12/30/2020, 01/28/2021, 12/07/2021    INFLUENZA 11/18/2019,  10/20/2020    Pneumococcal Polysaccharide PPV23 11/03/2020    Tdap 11/27/2018    Zoster Vaccine Recombinant 05/06/2019, 07/09/2019       Rajinder Valles MD             [1]   Patient Active Problem List  Diagnosis    Arthritis    Benign hypertension    GERD without esophagitis    Gout    Hyperlipidemia    Other restrictive cardiomyopathy (HCC)    Mediastinal mass    JOSE ELIAS (obstructive sleep apnea)    Need for pneumococcal vaccination    Status post laparoscopic hernia repair    Other hemorrhoids    COVID-19 virus infection    Epididymitis    Ingrown toenail    Tendonitis    Primary osteoarthritis of both first carpometacarpal joints    Chronic left-sided thoracic back pain   [2]   Past Medical History:  Diagnosis Date    Arthritis     Cardiomyopathy (HCC)     Chronic kidney disease Kidney stones Gout    Coronary artery disease Myocardiopathy    Gout     Hyperlipidemia     Hypertension     Ingrown toenail     Inguinal hernia, right     Irregular heartbeat     Kidney stone     Kidney stones     Mediastinal mass     Anterior     Palpitations     Sleep apnea     does not use CPAP    Vertigo    [3]   Past Surgical History:  Procedure Laterality Date    HERNIA REPAIR      at age 1    NC ARTHRP INTERCARPAL/CARP/MTCRPL JT INTERPOSITION Left 2/18/2025    Procedure: ARTHROPLASTY THUMB WEILBY;  Surgeon: Radha Donaldson MD;  Location: [facility];  Service: Orthopedics    NC LAPAROSCOPY SURG RPR INITIAL INGUINAL HERNIA Bilateral 5/13/2021    Procedure: REPAIR HERNIA INGUINAL, LAPAROSCOPIC with mesh;  Surgeon: Andrea Chavez MD;  Location: [facility];  Service: General   [4]   Family History  Problem Relation Name Age of Onset    Arthritis Mother Mom     Hypertension Mother Mom     Heart disease Mother Mom         CHF    Gout Father Dad     Cancer Father Dad     Gout Family      Hypertension Brother Jadiel     Gout Brother Jadiel     Hypertension Brother Andrea     Gout Brother Andrea     Hypertension Brother Alessandro      Hypertension Brother Bob     Scoliosis Brother Bob     Substance Abuse Neg Hx      Alcohol abuse Neg Hx      Mental illness Neg Hx     [5]   Allergies  Allergen Reactions    Lisinopril Swelling    Shellfish Allergy - Food Allergy Other (See Comments)     Pt states it causes gout    [6]   Current Outpatient Medications   Medication Sig Dispense Refill    acetaminophen (TYLENOL) 500 mg tablet Take 500 mg by mouth every 6 (six) hours as needed for mild pain      allopurinol (ZYLOPRIM) 300 mg tablet TAKE ONE TABLET BY MOUTH EVERY DAY 90 tablet 1    aspirin 325 mg tablet Take 325 mg by mouth as needed for mild pain      B Complex Vitamins (VITAMIN B COMPLEX PO) Take by mouth in the morning. With multivitamin.      Melatonin 5 MG TABS Take by mouth      metoprolol succinate (TOPROL-XL) 50 mg 24 hr tablet Take 1 tablet (50 mg total) by mouth daily 90 tablet 1    spironolactone (ALDACTONE) 25 mg tablet Take 1 tablet (25 mg total) by mouth daily 90 tablet 3    valsartan-hydrochlorothiazide (DIOVAN-HCT) 80-12.5 MG per tablet Take 1 tablet by mouth daily 90 tablet 1     No current facility-administered medications for this visit.

## 2025-06-26 NOTE — ASSESSMENT & PLAN NOTE
Back pain.  Patient with chronic but intermittent left back pain.  Seen at symptoms only last a few days after taking Tylenol and are infrequent throughout the year I suspect he has a muscular etiology to his symptoms.  He will try to use a cart to carry his supplies needed for work throughout the day rather than using a backpack which may be exacerbating his symptoms.

## 2025-08-14 ENCOUNTER — OFFICE VISIT (OUTPATIENT)
Dept: FAMILY MEDICINE CLINIC | Facility: CLINIC | Age: 70
End: 2025-08-14
Payer: COMMERCIAL

## (undated) DEVICE — INTENDED FOR TISSUE SEPARATION, AND OTHER PROCEDURES THAT REQUIRE A SHARP SURGICAL BLADE TO PUNCTURE OR CUT.: Brand: BARD-PARKER ® CARBON RIB-BACK BLADES

## (undated) DEVICE — ACE WRAP 4 IN UNSTERILE

## (undated) DEVICE — PACK PBDS LAP CHOLE RF

## (undated) DEVICE — INTENDED FOR TISSUE SEPARATION, AND OTHER PROCEDURES THAT REQUIRE A SHARP SURGICAL BLADE TO PUNCTURE OR CUT.: Brand: BARD-PARKER SAFETY BLADES SIZE 11, STERILE

## (undated) DEVICE — SUT ETHILON 4-0 P-3 18 IN 691G

## (undated) DEVICE — CHLORAPREP HI-LITE 26ML ORANGE

## (undated) DEVICE — OCCLUSIVE GAUZE STRIP,3% BISMUTH TRIBROMOPHENATE IN PETROLATUM BLEND: Brand: XEROFORM

## (undated) DEVICE — NEEDLE 25G X 1 1/2

## (undated) DEVICE — ACE WRAP 3 IN UNSTERILE

## (undated) DEVICE — CAST PADDING 4 IN SYNTHETIC NON-STRL

## (undated) DEVICE — SUT VICRYL 2-0 SH 27 IN UNDYED J417H

## (undated) DEVICE — SYRINGE 10ML LL CONTROL TOP

## (undated) DEVICE — LAPAROSCOPIC TROCAR SLEEVE/SINGLE USE: Brand: KII® OPTICAL ACCESS SYSTEM

## (undated) DEVICE — ADHESIVE SKIN HIGH VISCOSITY EXOFIN 1ML

## (undated) DEVICE — SUT ETHILON 4-0 PS-2 18 IN 1667H

## (undated) DEVICE — DRAPE LAPAROTOMY W/POUCHES

## (undated) DEVICE — GLOVE INDICATOR PI UNDERGLOVE SZ 7 BLUE

## (undated) DEVICE — STOCKINETTE REGULAR

## (undated) DEVICE — SUT VICRYL PLUS 0 UR-6 27IN VCP603H

## (undated) DEVICE — CURITY STRETCH BANDAGE: Brand: CURITY

## (undated) DEVICE — TROCAR: Brand: KII® SLEEVE

## (undated) DEVICE — CUFF TOURNIQUET 18 X 4 IN QUICK CONNECT DISP 1 BLADDER

## (undated) DEVICE — TUBING SMOKE EVAC W/FILTRATION DEVICE PLUMEPORT ACTIV

## (undated) DEVICE — GLOVE SRG BIOGEL ORTHOPEDIC 7.5

## (undated) DEVICE — GAUZE SPONGES,16 PLY: Brand: CURITY

## (undated) DEVICE — ARM SLING: Brand: DEROYAL

## (undated) DEVICE — DISPOSABLE EQUIPMENT COVER: Brand: SMALL TOWEL DRAPE

## (undated) DEVICE — 40529 DERMAPROX PAD 11'' X 15'' X 1'': Brand: 40529 DERMAPROX PAD 11'' X 15'' X 1''

## (undated) DEVICE — KERLIX BANDAGE ROLL: Brand: KERLIX

## (undated) DEVICE — PAD CAST 4 IN COTTON NON STERILE

## (undated) DEVICE — STERILE BETHLEHEM PLASTIC HAND: Brand: CARDINAL HEALTH

## (undated) DEVICE — SPLINT 4 X 15 IN FAST SET PLASTER

## (undated) DEVICE — LAPAROSCOPIC TROCAR SLEEVE/SINGLE USE: Brand: KII® SLEEVE

## (undated) DEVICE — GLOVE SRG BIOGEL 7

## (undated) DEVICE — SUT MONOCRYL PLUS 4-0 PS-2 18 IN MCP496G

## (undated) DEVICE — GLOVE INDICATOR PI UNDERGLOVE SZ 8 BLUE